# Patient Record
Sex: FEMALE | Race: WHITE | Employment: OTHER | URBAN - NONMETROPOLITAN AREA
[De-identification: names, ages, dates, MRNs, and addresses within clinical notes are randomized per-mention and may not be internally consistent; named-entity substitution may affect disease eponyms.]

---

## 2020-12-25 ENCOUNTER — APPOINTMENT (OUTPATIENT)
Dept: GENERAL RADIOLOGY | Age: 85
End: 2020-12-25
Payer: MEDICARE

## 2020-12-25 ENCOUNTER — HOSPITAL ENCOUNTER (INPATIENT)
Age: 85
LOS: 4 days | Discharge: NURSING, REHAB OR LONG-TERM CARE | DRG: 242 | End: 2020-12-29
Attending: INTERNAL MEDICINE | Admitting: INTERNAL MEDICINE
Payer: MEDICARE

## 2020-12-25 ENCOUNTER — HOSPITAL ENCOUNTER (EMERGENCY)
Age: 85
Discharge: ANOTHER HOSPITAL | End: 2020-12-25
Attending: EMERGENCY MEDICINE
Payer: MEDICARE

## 2020-12-25 VITALS
DIASTOLIC BLOOD PRESSURE: 100 MMHG | BODY MASS INDEX: 30.06 KG/M2 | SYSTOLIC BLOOD PRESSURE: 144 MMHG | TEMPERATURE: 97.5 F | WEIGHT: 210 LBS | HEART RATE: 32 BPM | OXYGEN SATURATION: 97 % | HEIGHT: 70 IN | RESPIRATION RATE: 17 BRPM

## 2020-12-25 DIAGNOSIS — I44.2 COMPLETE HEART BLOCK (HCC): Primary | ICD-10-CM

## 2020-12-25 LAB
ABO/RH: NORMAL
ALBUMIN SERPL-MCNC: 3.8 GM/DL (ref 3.4–5)
ALP BLD-CCNC: 85 IU/L (ref 40–129)
ALT SERPL-CCNC: 19 U/L (ref 10–40)
ANION GAP SERPL CALCULATED.3IONS-SCNC: 11 MMOL/L (ref 4–16)
ANTIBODY SCREEN: NEGATIVE
APTT: 40.9 SECONDS (ref 25.1–37.1)
AST SERPL-CCNC: 28 IU/L (ref 15–37)
BASOPHILS ABSOLUTE: 0.1 K/CU MM
BASOPHILS RELATIVE PERCENT: 1 % (ref 0–1)
BILIRUB SERPL-MCNC: 0.4 MG/DL (ref 0–1)
BUN BLDV-MCNC: 15 MG/DL (ref 6–23)
CALCIUM SERPL-MCNC: 10.5 MG/DL (ref 8.3–10.6)
CHLORIDE BLD-SCNC: 99 MMOL/L (ref 99–110)
CO2: 26 MMOL/L (ref 21–32)
CREAT SERPL-MCNC: 0.9 MG/DL (ref 0.6–1.1)
DIFFERENTIAL TYPE: ABNORMAL
EOSINOPHILS ABSOLUTE: 0.3 K/CU MM
EOSINOPHILS RELATIVE PERCENT: 3.4 % (ref 0–3)
GFR AFRICAN AMERICAN: >60 ML/MIN/1.73M2
GFR NON-AFRICAN AMERICAN: 60 ML/MIN/1.73M2
GLUCOSE BLD-MCNC: 118 MG/DL (ref 70–99)
GLUCOSE BLD-MCNC: 118 MG/DL (ref 70–99)
HCT VFR BLD CALC: 35.6 % (ref 37–47)
HEMOGLOBIN: 11.2 GM/DL (ref 12.5–16)
IMMATURE NEUTROPHIL %: 0.2 % (ref 0–0.43)
INR BLD: 1.09 INDEX
LYMPHOCYTES ABSOLUTE: 2.1 K/CU MM
LYMPHOCYTES RELATIVE PERCENT: 24.2 % (ref 24–44)
MCH RBC QN AUTO: 30.5 PG (ref 27–31)
MCHC RBC AUTO-ENTMCNC: 31.5 % (ref 32–36)
MCV RBC AUTO: 97 FL (ref 78–100)
MONOCYTES ABSOLUTE: 1 K/CU MM
MONOCYTES RELATIVE PERCENT: 11.9 % (ref 0–4)
PDW BLD-RTO: 13.5 % (ref 11.7–14.9)
PLATELET # BLD: 277 K/CU MM (ref 140–440)
PMV BLD AUTO: 9.7 FL (ref 7.5–11.1)
POTASSIUM SERPL-SCNC: 4 MMOL/L (ref 3.5–5.1)
PROTHROMBIN TIME: 12.5 SECONDS (ref 11.7–14.5)
RBC # BLD: 3.67 M/CU MM (ref 4.2–5.4)
SARS-COV-2, NAAT: NOT DETECTED
SEGMENTED NEUTROPHILS ABSOLUTE COUNT: 5.2 K/CU MM
SEGMENTED NEUTROPHILS RELATIVE PERCENT: 59.3 % (ref 36–66)
SODIUM BLD-SCNC: 136 MMOL/L (ref 135–145)
SOURCE: NORMAL
TOTAL IMMATURE NEUTOROPHIL: 0.02 K/CU MM
TOTAL PROTEIN: 6.4 GM/DL (ref 6.4–8.2)
TROPONIN T: <0.01 NG/ML
WBC # BLD: 8.7 K/CU MM (ref 4–10.5)

## 2020-12-25 PROCEDURE — 99285 EMERGENCY DEPT VISIT HI MDM: CPT

## 2020-12-25 PROCEDURE — 86900 BLOOD TYPING SEROLOGIC ABO: CPT

## 2020-12-25 PROCEDURE — 93005 ELECTROCARDIOGRAM TRACING: CPT | Performed by: EMERGENCY MEDICINE

## 2020-12-25 PROCEDURE — 2580000003 HC RX 258: Performed by: INTERNAL MEDICINE

## 2020-12-25 PROCEDURE — 85730 THROMBOPLASTIN TIME PARTIAL: CPT

## 2020-12-25 PROCEDURE — 86901 BLOOD TYPING SEROLOGIC RH(D): CPT

## 2020-12-25 PROCEDURE — 80053 COMPREHEN METABOLIC PANEL: CPT

## 2020-12-25 PROCEDURE — 85610 PROTHROMBIN TIME: CPT

## 2020-12-25 PROCEDURE — 81001 URINALYSIS AUTO W/SCOPE: CPT

## 2020-12-25 PROCEDURE — 2580000003 HC RX 258: Performed by: EMERGENCY MEDICINE

## 2020-12-25 PROCEDURE — 82962 GLUCOSE BLOOD TEST: CPT

## 2020-12-25 PROCEDURE — U0002 COVID-19 LAB TEST NON-CDC: HCPCS

## 2020-12-25 PROCEDURE — 71045 X-RAY EXAM CHEST 1 VIEW: CPT

## 2020-12-25 PROCEDURE — 85025 COMPLETE CBC W/AUTO DIFF WBC: CPT

## 2020-12-25 PROCEDURE — 96360 HYDRATION IV INFUSION INIT: CPT

## 2020-12-25 PROCEDURE — 2000000000 HC ICU R&B

## 2020-12-25 PROCEDURE — 86850 RBC ANTIBODY SCREEN: CPT

## 2020-12-25 PROCEDURE — 84484 ASSAY OF TROPONIN QUANT: CPT

## 2020-12-25 RX ORDER — FERROUS SULFATE 325(65) MG
325 TABLET ORAL
Status: ON HOLD | COMMUNITY
End: 2020-12-29 | Stop reason: SDUPTHER

## 2020-12-25 RX ORDER — SENNA AND DOCUSATE SODIUM 50; 8.6 MG/1; MG/1
2 TABLET, FILM COATED ORAL DAILY
Status: ON HOLD | COMMUNITY
End: 2020-12-29 | Stop reason: SDUPTHER

## 2020-12-25 RX ORDER — 0.9 % SODIUM CHLORIDE 0.9 %
1000 INTRAVENOUS SOLUTION INTRAVENOUS ONCE
Status: COMPLETED | OUTPATIENT
Start: 2020-12-25 | End: 2020-12-25

## 2020-12-25 RX ORDER — AMLODIPINE BESYLATE 5 MG/1
5 TABLET ORAL DAILY
Status: ON HOLD | COMMUNITY
End: 2020-12-29 | Stop reason: HOSPADM

## 2020-12-25 RX ORDER — ALBUTEROL SULFATE 90 UG/1
2 AEROSOL, METERED RESPIRATORY (INHALATION) EVERY 6 HOURS PRN
COMMUNITY

## 2020-12-25 RX ORDER — SODIUM CHLORIDE 0.9 % (FLUSH) 0.9 %
10 SYRINGE (ML) INJECTION EVERY 12 HOURS SCHEDULED
Status: DISCONTINUED | OUTPATIENT
Start: 2020-12-25 | End: 2020-12-27 | Stop reason: SDUPTHER

## 2020-12-25 RX ORDER — PROMETHAZINE HYDROCHLORIDE 25 MG/1
12.5 TABLET ORAL EVERY 6 HOURS PRN
Status: DISCONTINUED | OUTPATIENT
Start: 2020-12-25 | End: 2020-12-29 | Stop reason: HOSPADM

## 2020-12-25 RX ORDER — ATORVASTATIN CALCIUM 10 MG/1
10 TABLET, FILM COATED ORAL DAILY
COMMUNITY

## 2020-12-25 RX ORDER — ACETAMINOPHEN 325 MG/1
650 TABLET ORAL EVERY 6 HOURS PRN
Status: DISCONTINUED | OUTPATIENT
Start: 2020-12-25 | End: 2020-12-28

## 2020-12-25 RX ORDER — SODIUM CHLORIDE 0.9 % (FLUSH) 0.9 %
10 SYRINGE (ML) INJECTION PRN
Status: DISCONTINUED | OUTPATIENT
Start: 2020-12-25 | End: 2020-12-27 | Stop reason: SDUPTHER

## 2020-12-25 RX ORDER — DOPAMINE HYDROCHLORIDE 160 MG/100ML
2.5 INJECTION, SOLUTION INTRAVENOUS CONTINUOUS
Status: DISCONTINUED | OUTPATIENT
Start: 2020-12-25 | End: 2020-12-27

## 2020-12-25 RX ORDER — LOSARTAN POTASSIUM 100 MG/1
100 TABLET ORAL DAILY
Status: ON HOLD | COMMUNITY
End: 2022-05-12 | Stop reason: HOSPADM

## 2020-12-25 RX ORDER — PANTOPRAZOLE SODIUM 20 MG/1
20 TABLET, DELAYED RELEASE ORAL DAILY
COMMUNITY

## 2020-12-25 RX ORDER — ACETAMINOPHEN 650 MG/1
650 SUPPOSITORY RECTAL EVERY 6 HOURS PRN
Status: DISCONTINUED | OUTPATIENT
Start: 2020-12-25 | End: 2020-12-28

## 2020-12-25 RX ORDER — BUDESONIDE AND FORMOTEROL FUMARATE DIHYDRATE 160; 4.5 UG/1; UG/1
2 AEROSOL RESPIRATORY (INHALATION) 2 TIMES DAILY
COMMUNITY

## 2020-12-25 RX ORDER — ONDANSETRON 2 MG/ML
4 INJECTION INTRAMUSCULAR; INTRAVENOUS EVERY 6 HOURS PRN
Status: DISCONTINUED | OUTPATIENT
Start: 2020-12-25 | End: 2020-12-29 | Stop reason: HOSPADM

## 2020-12-25 RX ORDER — POLYETHYLENE GLYCOL 3350 17 G/17G
17 POWDER, FOR SOLUTION ORAL DAILY PRN
Status: DISCONTINUED | OUTPATIENT
Start: 2020-12-25 | End: 2020-12-29 | Stop reason: HOSPADM

## 2020-12-25 RX ORDER — ASPIRIN 81 MG/1
81 TABLET, CHEWABLE ORAL DAILY
COMMUNITY

## 2020-12-25 RX ADMIN — SODIUM CHLORIDE, PRESERVATIVE FREE 10 ML: 5 INJECTION INTRAVENOUS at 23:30

## 2020-12-25 RX ADMIN — SODIUM CHLORIDE 1000 ML: 9 INJECTION, SOLUTION INTRAVENOUS at 19:23

## 2020-12-25 NOTE — ED NOTES
Called access center to have [facility] hospitalist paged for consult      Latonia Gao  12/25/20 4953

## 2020-12-25 NOTE — ED PROVIDER NOTES
Cirilo Hollingsworth MD        atorvastatin (LIPITOR) tablet 10 mg  10 mg Oral Daily Cirilo Hollingsworth MD        budesonide-formoterol (SYMBICORT) 160-4.5 MCG/ACT inhaler 2 puff  2 puff Inhalation BID Cirilo Hollingsworth MD        ferrous sulfate (IRON 325) tablet 325 mg  325 mg Oral Daily with breakfast Cirilo Hollingsworth MD        losartan (COZAAR) tablet 100 mg  100 mg Oral Daily Cirilo Hollingsworth MD        pantoprazole (PROTONIX) tablet 20 mg  20 mg Oral Daily Cirilo Hollingsworth MD   20 mg at 12/26/20 0616    sennosides-docusate sodium (SENOKOT-S) 8.6-50 MG tablet 2 tablet  2 tablet Oral Daily Cirilo Hollingsworth MD        vitamin D CAPS 1,000 Units  1,000 Units Oral Daily Cirilo Hollingsworth MD        sodium chloride flush 0.9 % injection 10 mL  10 mL Intravenous 2 times per day Cirilo Hollingsworth MD   10 mL at 12/25/20 2330    sodium chloride flush 0.9 % injection 10 mL  10 mL Intravenous PRN Cirilo Hollingsworth MD        enoxaparin (LOVENOX) injection 40 mg  40 mg Subcutaneous Daily Cirilo Hollingsworth MD        promethazine (PHENERGAN) tablet 12.5 mg  12.5 mg Oral Q6H PRN Cirilo Hollingsworth MD        Or    ondansetron (ZOFRAN) injection 4 mg  4 mg Intravenous Q6H PRN Cirilo Hollingsworth MD        polyethylene glycol (GLYCOLAX) packet 17 g  17 g Oral Daily PRN Cirilo Hollingsworth MD        acetaminophen (TYLENOL) tablet 650 mg  650 mg Oral Q6H PRN Cirilo Hollingsworth MD        Or   Miriam Navraro acetaminophen (TYLENOL) suppository 650 mg  650 mg Rectal Q6H PRN Cirilo Hollingsworth MD        DOPamine (INTROPIN) 400 mg in dextrose 5 % 250 mL infusion  2.5 mcg/kg/min Intravenous Continuous iCrilo Hollingsworth MD           Allergies   Allergen Reactions    Lisinopril     Pcn [Penicillins]     Sulfa Antibiotics        Nursing Notes Reviewed    Physical Exam:  Triage VS:    ED Triage Vitals   Enc Vitals Group      BP  192/88      Pulse  44      Resp  18      Temp  97.5 °F      Temp src oral      SpO2  97%     My pulse ox interpretation is - normal    General appearance:  Patient is awake, alert, oriented. Following commands and answering questions. GCS is 15. Non-toxic in appearance. Skin:  Warm. Dry. Intact. No rashes, petechiae, purpura. Eye:  Pupils are equal, round, reactive. Extraocular movements intact. No nystagmus. No gaze deviation. Head, ears, nose, mouth and throat:  Head is normocephalic, atraumatic. No external masses or lesions. No nasal drainage. Pharynx is clear, non-erythematous. Airway is patent. Mucous membranes are moist  Neck:  Supple. No nuchal rigidity. Trachea midline. No masses, thyromegaly or lymphadenopathy. No JVD. No carotid thrills or bruits. Extremity:  No clubbing, cyanosis, or edema. No joint swelling. Normal muscle tone. Full range of motion in extremities. Heart:  Bradycardic rate and sinus rhythm. Audible S1 & S2. No audible murmurs, rubs, or gallops. Perfusion:  Symmetric peripheral pulses. Brisk capillary refill. Respiratory:  Lungs clear to auscultation bilaterally. No rales, rhonchi or wheezes. Respirations nonlabored. Abdominal:  Soft. Nontender. Non distended. Normal bowel sounds. No midline pulsatile abdominal masses, thrills or bruits. Back:  No CVA tenderness to palpation. No midline tenderness to palpation. Neurological:  Alert and oriented times 3. No focal or lateralizing neurological deficits.              Psychiatric:  Cooperative      *I have reviewed and interpreted all of the currently available results from this visit*    Labs  Results for orders placed or performed during the hospital encounter of 12/25/20   CBC Auto Differential   Result Value Ref Range    WBC 8.7 4.0 - 10.5 K/CU MM    RBC 3.67 (L) 4.2 - 5.4 M/CU MM    Hemoglobin 11.2 (L) 12.5 - 16.0 GM/DL    Hematocrit 35.6 (L) 37 - 47 %    MCV 97.0 78 - 100 FL    MCH 30.5 27 - 31 PG    MCHC 31.5 (L) 32.0 - 36.0 %    RDW 13.5 11.7 - 14.9 %    Platelets 278 871 - 440 K/CU MM    MPV 9.7 7.5 - 11.1 FL    Differential Type AUTOMATED DIFFERENTIAL     Segs Relative 59.3 36 - 66 %    Lymphocytes % 24.2 24 - 44 %    Monocytes % 11.9 (H) 0 - 4 %    Eosinophils % 3.4 (H) 0 - 3 %    Basophils % 1.0 0 - 1 %    Segs Absolute 5.2 K/CU MM    Lymphocytes Absolute 2.1 K/CU MM    Monocytes Absolute 1.0 K/CU MM    Eosinophils Absolute 0.3 K/CU MM    Basophils Absolute 0.1 K/CU MM    Immature Neutrophil % 0.2 0 - 0.43 %    Total Immature Neutrophil 0.02 K/CU MM   Comprehensive Metabolic Panel w/ Reflex to MG   Result Value Ref Range    Sodium 136 135 - 145 MMOL/L    Potassium 4.0 3.5 - 5.1 MMOL/L    Chloride 99 99 - 110 mMol/L    CO2 26 21 - 32 MMOL/L    BUN 15 6 - 23 MG/DL    CREATININE 0.9 0.6 - 1.1 MG/DL    Glucose 118 (H) 70 - 99 MG/DL    Calcium 10.5 8.3 - 10.6 MG/DL    Alb 3.8 3.4 - 5.0 GM/DL    Total Protein 6.4 6.4 - 8.2 GM/DL    Total Bilirubin 0.4 0.0 - 1.0 MG/DL    ALT 19 10 - 40 U/L    AST 28 15 - 37 IU/L    Alkaline Phosphatase 85 40 - 129 IU/L    GFR Non-African American 60 (L) >60 mL/min/1.73m2    GFR African American >60 >60 mL/min/1.73m2    Anion Gap 11 4 - 16   Troponin   Result Value Ref Range    Troponin T <0.010 <0.01 NG/ML   Urinalysis Reflex to Culture    Specimen: Urine   Result Value Ref Range    Color, UA YELLOW YELLOW    Clarity, UA CLEAR CLEAR    Glucose, Urine NEGATIVE NEGATIVE MG/DL    Bilirubin Urine NEGATIVE NEGATIVE MG/DL    Ketones, Urine NEGATIVE NEGATIVE MG/DL    Specific Gravity, UA 1.015 1.001 - 1.035    Blood, Urine NEGATIVE NEGATIVE    pH, Urine 7.5 5.0 - 8.0    Protein, UA NEGATIVE NEGATIVE MG/DL    Urobilinogen, Urine 0.2 0.2 - 1.0 MG/DL    Nitrite Urine, Quantitative NEGATIVE NEGATIVE    Leukocyte Esterase, Urine NEGATIVE NEGATIVE    RBC, UA NO CELLS SEEN 0 - 6 /HPF    WBC, UA NO CELLS SEEN 0 - 5 /HPF    Epithelial Cells, UA NO CELLS SEEN /HPF    Cast Type NO CAST FORMS SEEN NO CAST FORMS SEEN /HPF    Bacteria, UA NEGATIVE NEGATIVE /HPF    Crystal Type NONE SEEN NEGATIVE /HPF    Mucus, UA NEGATIVE NEGATIVE HPF   APTT   Result Value Ref Range    aPTT 40.9 (H) 25.1 - 37.1 SECONDS   Protime-INR   Result Value Ref Range    Protime 12.5 11.7 - 14.5 SECONDS    INR 1.09 INDEX   COVID-19    Specimen: Nasopharyngeal Swab   Result Value Ref Range    Source THROAT     SARS-CoV-2, NAAT NOT DETECTED    EKG 12 Lead   Result Value Ref Range    Ventricular Rate 42 BPM    Atrial Rate 72 BPM    QRS Duration 154 ms    Q-T Interval 502 ms    QTc Calculation (Bazett) 419 ms    P Axis -4 degrees    R Axis -8 degrees    T Axis -11 degrees    Diagnosis       Sinus rhythm with complete heart block and Wide QRS rhythm  Right bundle branch block  Septal infarct , age undetermined  Inferior infarct , age undetermined  Abnormal ECG  No previous ECGs available     TYPE AND SCREEN   Result Value Ref Range    ABO/Rh O NEGATIVE     Antibody Screen NEGATIVE        Radiographs:  Xr Chest Portable    Result Date: 12/25/2020  EXAMINATION: ONE XRAY VIEW OF THE CHEST 12/25/2020 5:27 pm COMPARISON: None. HISTORY: ORDERING SYSTEM PROVIDED HISTORY: cough TECHNOLOGIST PROVIDED HISTORY: Reason for exam:->cough Acuity: Acute Type of Exam: Initial Additional signs and symptoms: cough FINDINGS: The cardiac silhouette is moderately enlarged. Nonspecific right paratracheal opacity. No pneumothorax, vascular congestion, consolidation, or pleural effusion is identified. No acute osseous abnormality. 1. Nonspecific right paratracheal opacity. Recommend further evaluation with CT. 2. Moderately enlarged cardiac silhouette. EKG: (All EKG's are interpreted by myself in the absence of a cardiologist). EKG performed on 12/25/2020 at 1655 demonstrates ventricular rate of 42 bpm in sinus rhythm with complete heart block and wide QRS rhythm. There is right bundle branch block pattern. MDM:  Patient was seen and evaluated in the emergency department by myself.  A thorough history and physical exam were performed, prior medical records were reviewed. Upon arrival to the emergency department, patient's vital signs were noted. Pressure is 192/88, pulse is 44. Respirations are 18. O2 saturation 97%. Temperature is afebrile. Patient was connected to continuous cardiopulmonary monitoring. Rhythm strip interpreted by myself demonstrating sinus rhythm. EKG was performed, interpreted by myself, as detailed above. Given concern for third-degree AV block, patient was immediately connected to [facility] monitor with pacer pads applied. Case discussed with cardiologist on-call Dr. Chiara Blue who agrees that patient is in third-degree AV block. As patient is hemodynamically stable, no recommendation for transvenous pacemaker. Recommends we initiate dopamine. Transcutaneous pacing is required. Dopamine is initiated. Differential diagnoses and treatment plan were discussed. IV access was established. Pertinent labs were drawn and radiographic studies were performed, once results are available, they are reviewed by myself. Labs demonstrate no leukocytosis. Patient does have normocytic anemia with a hemoglobin of 11.2. No thrombocytopenia. Electrolytes within normal history no signs of kidney injury glucose within normal limits. AST and ALT are unremarkable. Troponin less than 0.010. Urinalysis negative for urinary tract infection. INR 1.09.  COVID-19 is not detected. Chest x-ray radiology report reads nonspecific right paratracheal opacity. On repeat evaluations, patient remains hemodynamically stable. Pressure remained stable, MAP remains above 65, systolic blood pressure above 120. Case is discussed with admitting hospitalist at [facility] Dr. Jhonatan Hernandez who is agreeable with treatment plan and accepts transfer. EMTALA paperwork and transfer paperwork are completed.   Patient is currently in the emergency department, in stable condition, awaiting bed placement and transfer port to [facility]. Clinical Impressions:  1. Third degree AV block ([address]    2. Accelerated hypertension        Procedures:  ACLS protocol      Critical Care Note:  Total critical care time provided today was 32 minutes. This excludes seperately billable procedures and family discussion time. Critical care time provided for obtaining history, conducting a physical exam, performing and monitoring interventions, ordering, collecting and interpreting tests, and establishing medical decision-making. There was a potential for life/limb threatening pathology requiring close evaluation and intervention with concern for patient decompensation. ED Provider Disposition:  DISPOSITION  Transfer to [facility]      Comment: Please note this report has been produced using speech recognition software and may contain errors related to that system including errors in grammar, punctuation, and spelling, as well as words and phrases that may be inappropriate. Efforts were made to edit the dictations.        [facility],   12/26/20 5075

## 2020-12-25 NOTE — ED NOTES
Pt. Up to [facility] W/ little assistance denies any dizzy ness or discomfort     Jimy Barajas RN  12/25/20 2604

## 2020-12-26 LAB
ALBUMIN SERPL-MCNC: 3.7 GM/DL (ref 3.4–5)
ALP BLD-CCNC: 84 IU/L (ref 40–128)
ALT SERPL-CCNC: 16 U/L (ref 10–40)
ANION GAP SERPL CALCULATED.3IONS-SCNC: 10 MMOL/L (ref 4–16)
APTT: 40.2 SECONDS (ref 25.1–37.1)
AST SERPL-CCNC: 21 IU/L (ref 15–37)
BACTERIA: NEGATIVE /HPF
BASOPHILS ABSOLUTE: 0.1 K/CU MM
BASOPHILS RELATIVE PERCENT: 0.8 % (ref 0–1)
BILIRUB SERPL-MCNC: 0.5 MG/DL (ref 0–1)
BILIRUBIN URINE: NEGATIVE MG/DL
BLOOD, URINE: NEGATIVE
BUN BLDV-MCNC: 11 MG/DL (ref 6–23)
CALCIUM SERPL-MCNC: 9.6 MG/DL (ref 8.3–10.6)
CAST TYPE: NORMAL /HPF
CHLORIDE BLD-SCNC: 102 MMOL/L (ref 99–110)
CLARITY: CLEAR
CO2: 26 MMOL/L (ref 21–32)
COLOR: YELLOW
CREAT SERPL-MCNC: 0.8 MG/DL (ref 0.6–1.1)
CRYSTAL TYPE: NORMAL /HPF
DIFFERENTIAL TYPE: ABNORMAL
EKG ATRIAL RATE: 40 BPM
EKG DIAGNOSIS: NORMAL
EKG P AXIS: 53 DEGREES
EKG P-R INTERVAL: 320 MS
EKG Q-T INTERVAL: 528 MS
EKG QRS DURATION: 148 MS
EKG QTC CALCULATION (BAZETT): 430 MS
EKG R AXIS: -25 DEGREES
EKG T AXIS: -12 DEGREES
EKG VENTRICULAR RATE: 40 BPM
EOSINOPHILS ABSOLUTE: 0.3 K/CU MM
EOSINOPHILS RELATIVE PERCENT: 2.8 % (ref 0–3)
EPITHELIAL CELLS, UA: NORMAL /HPF
GFR AFRICAN AMERICAN: >60 ML/MIN/1.73M2
GFR NON-AFRICAN AMERICAN: >60 ML/MIN/1.73M2
GLUCOSE BLD-MCNC: 115 MG/DL (ref 70–99)
GLUCOSE BLD-MCNC: 128 MG/DL (ref 70–99)
GLUCOSE, URINE: NEGATIVE MG/DL
HCT VFR BLD CALC: 33.8 % (ref 37–47)
HEMOGLOBIN: 10.8 GM/DL (ref 12.5–16)
IMMATURE NEUTROPHIL %: 0.4 % (ref 0–0.43)
INR BLD: 1.02 INDEX
KETONES, URINE: NEGATIVE MG/DL
LEUKOCYTE ESTERASE, URINE: NEGATIVE
LYMPHOCYTES ABSOLUTE: 1.9 K/CU MM
LYMPHOCYTES RELATIVE PERCENT: 19.8 % (ref 24–44)
MCH RBC QN AUTO: 30.3 PG (ref 27–31)
MCHC RBC AUTO-ENTMCNC: 32 % (ref 32–36)
MCV RBC AUTO: 94.9 FL (ref 78–100)
MONOCYTES ABSOLUTE: 1.3 K/CU MM
MONOCYTES RELATIVE PERCENT: 13.3 % (ref 0–4)
MUCUS: NEGATIVE HPF
NITRITE URINE, QUANTITATIVE: NEGATIVE
NUCLEATED RBC %: 0 %
PDW BLD-RTO: 13.6 % (ref 11.7–14.9)
PH, URINE: 7.5 (ref 5–8)
PLATELET # BLD: 256 K/CU MM (ref 140–440)
PMV BLD AUTO: 9.3 FL (ref 7.5–11.1)
POTASSIUM SERPL-SCNC: 4 MMOL/L (ref 3.5–5.1)
PROTEIN UA: NEGATIVE MG/DL
PROTHROMBIN TIME: 12.3 SECONDS (ref 11.7–14.5)
RBC # BLD: 3.56 M/CU MM (ref 4.2–5.4)
RBC URINE: NORMAL /HPF (ref 0–6)
SEGMENTED NEUTROPHILS ABSOLUTE COUNT: 6.1 K/CU MM
SEGMENTED NEUTROPHILS RELATIVE PERCENT: 62.9 % (ref 36–66)
SODIUM BLD-SCNC: 138 MMOL/L (ref 135–145)
SPECIFIC GRAVITY UA: 1.01 (ref 1–1.03)
TOTAL IMMATURE NEUTOROPHIL: 0.04 K/CU MM
TOTAL NUCLEATED RBC: 0 K/CU MM
TOTAL PROTEIN: 5.9 GM/DL (ref 6.4–8.2)
UROBILINOGEN, URINE: 0.2 MG/DL (ref 0.2–1)
WBC # BLD: 9.8 K/CU MM (ref 4–10.5)
WBC UA: NORMAL /HPF (ref 0–5)

## 2020-12-26 PROCEDURE — 36415 COLL VENOUS BLD VENIPUNCTURE: CPT

## 2020-12-26 PROCEDURE — 6370000000 HC RX 637 (ALT 250 FOR IP): Performed by: INTERNAL MEDICINE

## 2020-12-26 PROCEDURE — 2000000000 HC ICU R&B

## 2020-12-26 PROCEDURE — 93005 ELECTROCARDIOGRAM TRACING: CPT | Performed by: INTERNAL MEDICINE

## 2020-12-26 PROCEDURE — 99222 1ST HOSP IP/OBS MODERATE 55: CPT | Performed by: INTERNAL MEDICINE

## 2020-12-26 PROCEDURE — 93005 ELECTROCARDIOGRAM TRACING: CPT | Performed by: SURGERY

## 2020-12-26 PROCEDURE — 94640 AIRWAY INHALATION TREATMENT: CPT

## 2020-12-26 PROCEDURE — 80053 COMPREHEN METABOLIC PANEL: CPT

## 2020-12-26 PROCEDURE — 85025 COMPLETE CBC W/AUTO DIFF WBC: CPT

## 2020-12-26 PROCEDURE — 2580000003 HC RX 258: Performed by: INTERNAL MEDICINE

## 2020-12-26 PROCEDURE — 2500000003 HC RX 250 WO HCPCS: Performed by: SURGERY

## 2020-12-26 PROCEDURE — 6360000002 HC RX W HCPCS: Performed by: INTERNAL MEDICINE

## 2020-12-26 PROCEDURE — 85730 THROMBOPLASTIN TIME PARTIAL: CPT

## 2020-12-26 PROCEDURE — 85610 PROTHROMBIN TIME: CPT

## 2020-12-26 PROCEDURE — 94761 N-INVAS EAR/PLS OXIMETRY MLT: CPT

## 2020-12-26 PROCEDURE — 93010 ELECTROCARDIOGRAM REPORT: CPT | Performed by: INTERNAL MEDICINE

## 2020-12-26 PROCEDURE — 2700000000 HC OXYGEN THERAPY PER DAY

## 2020-12-26 PROCEDURE — 82962 GLUCOSE BLOOD TEST: CPT

## 2020-12-26 RX ORDER — BUDESONIDE AND FORMOTEROL FUMARATE DIHYDRATE 160; 4.5 UG/1; UG/1
2 AEROSOL RESPIRATORY (INHALATION) 2 TIMES DAILY
Status: DISCONTINUED | OUTPATIENT
Start: 2020-12-26 | End: 2020-12-29 | Stop reason: HOSPADM

## 2020-12-26 RX ORDER — LOSARTAN POTASSIUM 100 MG/1
100 TABLET ORAL DAILY
Status: DISCONTINUED | OUTPATIENT
Start: 2020-12-26 | End: 2020-12-29 | Stop reason: HOSPADM

## 2020-12-26 RX ORDER — HYDRALAZINE HYDROCHLORIDE 20 MG/ML
10 INJECTION INTRAMUSCULAR; INTRAVENOUS EVERY 4 HOURS PRN
Status: DISCONTINUED | OUTPATIENT
Start: 2020-12-26 | End: 2020-12-29 | Stop reason: HOSPADM

## 2020-12-26 RX ORDER — NITROGLYCERIN 20 MG/100ML
5 INJECTION INTRAVENOUS CONTINUOUS
Status: DISCONTINUED | OUTPATIENT
Start: 2020-12-26 | End: 2020-12-27

## 2020-12-26 RX ORDER — FERROUS SULFATE 325(65) MG
325 TABLET ORAL
Status: DISCONTINUED | OUTPATIENT
Start: 2020-12-26 | End: 2020-12-29 | Stop reason: HOSPADM

## 2020-12-26 RX ORDER — ASPIRIN 81 MG/1
81 TABLET, CHEWABLE ORAL DAILY
Status: DISCONTINUED | OUTPATIENT
Start: 2020-12-26 | End: 2020-12-29 | Stop reason: HOSPADM

## 2020-12-26 RX ORDER — ALBUTEROL SULFATE 90 UG/1
2 AEROSOL, METERED RESPIRATORY (INHALATION) EVERY 6 HOURS PRN
Status: DISCONTINUED | OUTPATIENT
Start: 2020-12-26 | End: 2020-12-29 | Stop reason: HOSPADM

## 2020-12-26 RX ORDER — PANTOPRAZOLE SODIUM 20 MG/1
20 TABLET, DELAYED RELEASE ORAL DAILY
Status: DISCONTINUED | OUTPATIENT
Start: 2020-12-26 | End: 2020-12-29 | Stop reason: HOSPADM

## 2020-12-26 RX ORDER — ATORVASTATIN CALCIUM 10 MG/1
10 TABLET, FILM COATED ORAL DAILY
Status: DISCONTINUED | OUTPATIENT
Start: 2020-12-26 | End: 2020-12-29 | Stop reason: HOSPADM

## 2020-12-26 RX ORDER — SENNA AND DOCUSATE SODIUM 50; 8.6 MG/1; MG/1
2 TABLET, FILM COATED ORAL DAILY
Status: DISCONTINUED | OUTPATIENT
Start: 2020-12-26 | End: 2020-12-29 | Stop reason: HOSPADM

## 2020-12-26 RX ADMIN — ASPIRIN 81 MG CHEWABLE TABLET 81 MG: 81 TABLET CHEWABLE at 09:27

## 2020-12-26 RX ADMIN — DOCUSATE SODIUM AND SENNOSIDES 2 TABLET: 8.6; 5 TABLET, FILM COATED ORAL at 09:27

## 2020-12-26 RX ADMIN — SODIUM CHLORIDE, PRESERVATIVE FREE 10 ML: 5 INJECTION INTRAVENOUS at 20:15

## 2020-12-26 RX ADMIN — BUDESONIDE AND FORMOTEROL FUMARATE DIHYDRATE 2 PUFF: 160; 4.5 AEROSOL RESPIRATORY (INHALATION) at 10:15

## 2020-12-26 RX ADMIN — Medication 1000 UNITS: at 09:27

## 2020-12-26 RX ADMIN — NITROGLYCERIN 5 MCG/MIN: 20 INJECTION INTRAVENOUS at 10:54

## 2020-12-26 RX ADMIN — SODIUM CHLORIDE, PRESERVATIVE FREE 10 ML: 5 INJECTION INTRAVENOUS at 09:33

## 2020-12-26 RX ADMIN — LOSARTAN POTASSIUM 100 MG: 100 TABLET, FILM COATED ORAL at 09:32

## 2020-12-26 RX ADMIN — PANTOPRAZOLE SODIUM 20 MG: 20 TABLET, DELAYED RELEASE ORAL at 06:16

## 2020-12-26 RX ADMIN — HYDRALAZINE HYDROCHLORIDE 10 MG: 20 INJECTION INTRAMUSCULAR; INTRAVENOUS at 10:44

## 2020-12-26 RX ADMIN — FERROUS SULFATE TAB 325 MG (65 MG ELEMENTAL FE) 325 MG: 325 (65 FE) TAB at 09:28

## 2020-12-26 RX ADMIN — BUDESONIDE AND FORMOTEROL FUMARATE DIHYDRATE 2 PUFF: 160; 4.5 AEROSOL RESPIRATORY (INHALATION) at 21:31

## 2020-12-26 RX ADMIN — ALBUTEROL SULFATE 2 PUFF: 90 AEROSOL, METERED RESPIRATORY (INHALATION) at 10:16

## 2020-12-26 RX ADMIN — ATORVASTATIN CALCIUM 10 MG: 10 TABLET, FILM COATED ORAL at 09:27

## 2020-12-26 RX ADMIN — HYDRALAZINE HYDROCHLORIDE 10 MG: 20 INJECTION INTRAMUSCULAR; INTRAVENOUS at 06:08

## 2020-12-26 RX ADMIN — ENOXAPARIN SODIUM 40 MG: 40 INJECTION SUBCUTANEOUS at 09:27

## 2020-12-26 ASSESSMENT — PAIN DESCRIPTION - PROGRESSION
CLINICAL_PROGRESSION: GRADUALLY WORSENING
CLINICAL_PROGRESSION: GRADUALLY WORSENING
CLINICAL_PROGRESSION: RAPIDLY WORSENING

## 2020-12-26 ASSESSMENT — PAIN DESCRIPTION - ORIENTATION: ORIENTATION: MID

## 2020-12-26 ASSESSMENT — PAIN DESCRIPTION - LOCATION
LOCATION: CHEST
LOCATION: CHEST

## 2020-12-26 ASSESSMENT — PAIN SCALES - GENERAL
PAINLEVEL_OUTOF10: 0
PAINLEVEL_OUTOF10: 5
PAINLEVEL_OUTOF10: 5
PAINLEVEL_OUTOF10: 2

## 2020-12-26 ASSESSMENT — PAIN DESCRIPTION - FREQUENCY
FREQUENCY: CONTINUOUS

## 2020-12-26 ASSESSMENT — PAIN - FUNCTIONAL ASSESSMENT
PAIN_FUNCTIONAL_ASSESSMENT: PREVENTS OR INTERFERES SOME ACTIVE ACTIVITIES AND ADLS
PAIN_FUNCTIONAL_ASSESSMENT: ACTIVITIES ARE NOT PREVENTED
PAIN_FUNCTIONAL_ASSESSMENT: ACTIVITIES ARE NOT PREVENTED
PAIN_FUNCTIONAL_ASSESSMENT: PREVENTS OR INTERFERES SOME ACTIVE ACTIVITIES AND ADLS
PAIN_FUNCTIONAL_ASSESSMENT: ACTIVITIES ARE NOT PREVENTED

## 2020-12-26 ASSESSMENT — PAIN DESCRIPTION - DESCRIPTORS
DESCRIPTORS: ACHING
DESCRIPTORS: ACHING
DESCRIPTORS: DISCOMFORT;ACHING
DESCRIPTORS: ACHING

## 2020-12-26 ASSESSMENT — PAIN DESCRIPTION - ONSET
ONSET: ON-GOING

## 2020-12-26 ASSESSMENT — PAIN DESCRIPTION - PAIN TYPE: TYPE: ACUTE PAIN

## 2020-12-26 NOTE — PROGRESS NOTES
Patiebnt c/o of mid chest pain #2-3  On 1-10 scale.   Dr Trixie Zacarias here, informed- states she needs a  pacer

## 2020-12-26 NOTE — CONSULTS
Department of Cardiovascular & Thoracic Surgery   Consult Note    Reason for Consult: Bradycardia and complete heart block syncope and chest pain    Requesting Physician: Dr. Melissa Seals    Date of Consult: 12/26/20       History Obtained From:  Patient, emr     HISTORY OF PRESENT ILLNESS:    The patient is a 81 y.o. female who presents with history of confusional state and shortness of breath.     She has been in a nursing home resident with history of hypertension and COPD  She was subsequently assessed in the emergency room and and a cardiology as well noted be in complete heart block with heart rate in the 40s  Patient placed on dopamine to maintain the heart rate and consider his request for permanent pacemaker    Past Medical History:        Diagnosis Date    Arthrofibrosis of total knee arthroplasty [address] [address]     bilateral    Asthma     CAD (coronary artery disease)     CHF (congestive heart failure) ([facility] [address] [facility]     Constipation     DDD (degenerative disc disease), cervical     Diabetes mellitus ([facility] [address] [facility]     Femur fracture, right ([facility] [address]     distal femur    GERD (gastroesophageal reflux disease)     Hypertension     Venous stasis      Past Surgical History:        Procedure Laterality Date    APPENDECTOMY      KNEE SURGERY       Current Medications:   Current Facility-Administered Medications: albuterol sulfate  (90 Base) MCG/ACT inhaler 2 puff, 2 puff, Inhalation, Q6H PRN  aspirin chewable tablet 81 mg, 81 mg, Oral, Daily  atorvastatin (LIPITOR) tablet 10 mg, 10 mg, Oral, Daily  budesonide-formoterol (SYMBICORT) 160-4.5 MCG/ACT inhaler 2 puff, 2 puff, Inhalation, BID  ferrous sulfate (IRON 325) tablet 325 mg, 325 mg, Oral, Daily with breakfast  losartan (COZAAR) tablet 100 mg, 100 mg, Oral, Daily  pantoprazole (PROTONIX) tablet 20 mg, 20 mg, Oral, Daily  sennosides-docusate sodium (SENOKOT-S) 8.6-50 MG tablet 2 tablet, 2 tablet, Oral, Daily  vitamin D CAPS 1,000 Units, 1,000 Units, Oral, Daily  hydrALAZINE (APRESOLINE) injection 10 mg, 10 mg, Intravenous, Q4H PRN  nitroGLYCERIN 50 mg in dextrose 5% 250 mL infusion, 5 mcg/min, Intravenous, Continuous  sodium chloride flush 0.9 % injection 10 mL, 10 mL, Intravenous, 2 times per day  sodium chloride flush 0.9 % injection 10 mL, 10 mL, Intravenous, PRN  enoxaparin (LOVENOX) injection 40 mg, 40 mg, Subcutaneous, Daily  promethazine (PHENERGAN) tablet 12.5 mg, 12.5 mg, Oral, Q6H PRN **OR** ondansetron (ZOFRAN) injection 4 mg, 4 mg, Intravenous, Q6H PRN  polyethylene glycol (GLYCOLAX) packet 17 g, 17 g, Oral, Daily PRN  acetaminophen (TYLENOL) tablet 650 mg, 650 mg, Oral, Q6H PRN **OR** acetaminophen (TYLENOL) suppository 650 mg, 650 mg, Rectal, Q6H PRN  DOPamine (INTROPIN) 400 mg in dextrose 5 % 250 mL infusion, 2.5 mcg/kg/min, Intravenous, Continuous  Allergies:     Allergies   Allergen Reactions    Lisinopril     Pcn [Penicillins]     Sulfa Antibiotics        Social History:   Social History     Socioeconomic History    Marital status:      Spouse name: Not on file    Number of children: 3    Years of education: Not on file    Highest education level: Not on file   Occupational History    Not on file   Social Needs    Financial resource strain: Not on file    Food insecurity     Worry: Not on file     Inability: Not on file   [employer] needs     Medical: Not on file     Non-medical: Not on file   Tobacco Use    Smoking status: Never Smoker    Smokeless tobacco: Never Used   Substance and Sexual Activity    Alcohol use: Not Currently    Drug use: Never    Sexual activity: Not Currently   Lifestyle    Physical activity     Days per week: Not on file     Minutes per session: Not on file    Stress: Not on file   Relationships    Social connections     Talks on phone: Not on file     Gets together: Not on file     Attends Faith service: Not on file     Active member of club or organization: Not on file     Attends meetings of clubs or organizations: Not on file     Relationship status: Not on file    Intimate partner violence     Fear of current or ex partner: Not on file     Emotionally abused: Not on file     Physically abused: Not on file     Forced sexual activity: Not on file   Other Topics Concern    Not on file   Social History Narrative    Not on file       Family History:    No family history on file. REVIEW OF SYSTEMS:  Constitutional: Negative for fever, chills, diaphoresis, appetite change and fatigue. HENT: Negative for sore throat, trouble swallowing and voice change. Respiratory: Negative for cough, positive for shortness of breath no wheezing. Cardiovascular: Negative for chest pain positive for SOB with one flight of stairs exertion, no pitting LE edema. Gastrointestinal: Negative for nausea, vomiting, abdominal distention, constipation, no diarrhea, blood in stool, anal bleeding or rectal pain. Musculoskeletal: Negative for joint swelling and arthralgias. Skin: Warm and dry, well perfused. Neurological: Negative for seizures, syncope, speech difficulty and weakness. Hematological/Lymphatic: Negative for adenopathy. No history of DVT/PE. Does not bruise/bleed easily. Psychiatric/Behavioral: Negative for agitation. All others reviewed and negative. EXAM:  Constitutional: Blood pressure (!) 174/60, pulse (!) 41, temperature 97.5 °F (36.4 °C), temperature source Oral, resp. rate 15, height 5' 9\" (1.753 m), weight 207 lb 10.8 oz (94.2 kg), SpO2 92 %. No apparent distress, appears stated age and cooperative. Neurologic: follows commands, no focal weakness noted   Lungs: Good respiratory effort.  Clear to auscultation,   CV: Regular rate/ rhythm , no peripheral edema, feet warm and well perfused  GI: Soft, non-tender in all four quadrants, non-distended, + bowel sounds, liver and spleen no palpable masses  : bladder nondistended   MSK: no obvious deformity   Skin: warm, pink and dry       DATA:  Chest

## 2020-12-26 NOTE — PROGRESS NOTES
previous alcohol use. She reports that she does not use drugs.   Family history:   no family history of CAD, STROKE of DM    Allergies   Allergen Reactions    Lisinopril     Pcn [Penicillins]     Sulfa Antibiotics            hydrALAZINE (APRESOLINE) 10 mg in sodium chloride 0.9 % 50 mL ivpb, Q4H PRN      albuterol sulfate  (90 Base) MCG/ACT inhaler 2 puff, Q6H PRN      aspirin chewable tablet 81 mg, Daily      atorvastatin (LIPITOR) tablet 10 mg, Daily      budesonide-formoterol (SYMBICORT) 160-4.5 MCG/ACT inhaler 2 puff, BID      ferrous sulfate (IRON 325) tablet 325 mg, Daily with breakfast      losartan (COZAAR) tablet 100 mg, Daily      pantoprazole (PROTONIX) tablet 20 mg, Daily      sennosides-docusate sodium (SENOKOT-S) 8.6-50 MG tablet 2 tablet, Daily      vitamin D CAPS 1,000 Units, Daily      sodium chloride flush 0.9 % injection 10 mL, 2 times per day      sodium chloride flush 0.9 % injection 10 mL, PRN      enoxaparin (LOVENOX) injection 40 mg, Daily      promethazine (PHENERGAN) tablet 12.5 mg, Q6H PRN    Or      ondansetron (ZOFRAN) injection 4 mg, Q6H PRN      polyethylene glycol (GLYCOLAX) packet 17 g, Daily PRN      acetaminophen (TYLENOL) tablet 650 mg, Q6H PRN    Or      acetaminophen (TYLENOL) suppository 650 mg, Q6H PRN      DOPamine (INTROPIN) 400 mg in dextrose 5 % 250 mL infusion, Continuous      Current Facility-Administered Medications   Medication Dose Route Frequency Provider Last Rate Last Admin    hydrALAZINE (APRESOLINE) 10 mg in sodium chloride 0.9 % 50 mL ivpb  10 mg Intravenous Q4H PRN Sunitha Brink MD   Stopped at 12/26/20 0653    albuterol sulfate  (90 Base) MCG/ACT inhaler 2 puff  2 puff Inhalation Q6H PRN Sunitha Brink MD        aspirin chewable tablet 81 mg  81 mg Oral Daily Sunitha Brink MD   81 mg at 12/26/20 5996    atorvastatin (LIPITOR) tablet 10 mg  10 mg Oral Daily Sunitha Brink MD   10 mg at 12/26/20 9109    budesonide-formoterol (SYMBICORT) 160-4.5 MCG/ACT inhaler 2 puff  2 puff Inhalation BID Luisa Gomez MD        ferrous sulfate (IRON 325) tablet 325 mg  325 mg Oral Daily with breakfast Luisa Gomez MD   325 mg at 12/26/20 0026    losartan (COZAAR) tablet 100 mg  100 mg Oral Daily Luisa Gomez MD        pantoprazole (PROTONIX) tablet 20 mg  20 mg Oral Daily Luisa Gomez MD   20 mg at 12/26/20 5096    sennosides-docusate sodium (SENOKOT-S) 8.6-50 MG tablet 2 tablet  2 tablet Oral Daily Luisa Gomez MD   2 tablet at 12/26/20 8437    vitamin D CAPS 1,000 Units  1,000 Units Oral Daily Luisa Gomez MD   1,000 Units at 12/26/20 6532    sodium chloride flush 0.9 % injection 10 mL  10 mL Intravenous 2 times per day Luisa Gomez MD   10 mL at 12/25/20 2330    sodium chloride flush 0.9 % injection 10 mL  10 mL Intravenous PRN Luisa Gomez MD        enoxaparin (LOVENOX) injection 40 mg  40 mg Subcutaneous Daily Luisa Gomez MD   40 mg at 12/26/20 1364    promethazine (PHENERGAN) tablet 12.5 mg  12.5 mg Oral Q6H PRN Luisa Gomez MD        Or    ondansetron (ZOFRAN) injection 4 mg  4 mg Intravenous Q6H PRN Luisa Gomez MD        polyethylene glycol (GLYCOLAX) packet 17 g  17 g Oral Daily PRN Luisa Gomez MD        acetaminophen (TYLENOL) tablet 650 mg  650 mg Oral Q6H PRN Luisa Gomez MD        Or   [facility] acetaminophen (TYLENOL) suppository 650 mg  650 mg Rectal Q6H PRN Luisa Gomez MD        DOPamine (INTROPIN) 400 mg in dextrose 5 % 250 mL infusion  2.5 mcg/kg/min Intravenous Continuous Luisa Gomez MD             Review of Systems:     · Constitutional: No Fever or Weight Loss + Dizziness, + fall No syncope  · Eyes: No Decreased Vision  · ENT: No Headaches, Hearing Loss or Vertigo  · Cardiovascular: No chest pain, dyspnea on exertion, palpitations or loss of consciousness  · Respiratory: No cough or wheezing    · Gastrointestinal: No abdominal pain, appetite loss, blood in stools, constipation, diarrhea or heartburn  · Genitourinary: No dysuria, trouble voiding, or hematuria  · Musculoskeletal:  No gait disturbance, weakness or joint complaints  · Integumentary: No rash or pruritis  · Neurological: No TIA or stroke symptoms  · Psychiatric: No anxiety or depression  · Endocrine: No malaise, fatigue or temperature intolerance  · Hematologic/Lymphatic: No bleeding problems, blood clots or swollen lymph nodes  · Allergic/Immunologic: No nasal congestion or hives    All other systems were reviewed and were negative otherwise. Physical Examination:      Vitals:    12/26/20 0700   BP: (!) 177/60   Pulse: (!) 43   Resp: (!) 33   Temp:    SpO2: 95%      Wt Readings from Last 3 Encounters:   12/26/20 207 lb 10.8 oz (94.2 kg)   12/25/20 210 lb (95.3 kg)     Body mass index is 30.67 kg/m². General Appearance:  No distress, conversant  Constitutional:  Well developed, Well nourished  HEENT:  Normocephalic, Atraumatic, Oropharynx moist, No oral exudates,   Nose normal. Neck Supple Carotid: no carotid bruit  Eyes:  Conjunctiva normal, No discharge. Respiratory:    Normal breath sounds, No respiratory distress, No wheezing, no use of accessory muscles, diaphragm movement is normal  No chest Tenderness  Cardiovascular: S1-S2 IRIR  No murmurs auscultated. No rubs, thrills or gallops. Normal  rhythm. Pedal pulses are normal. No pedal edema  GI:  Soft Non tender, non distended. :  No CVA tenderness. Musculoskeletal:   No tenderness, No cyanosis, No clubbing. Integument:  Warm, Dry, No erythema, No rash. Lymphatic:  No lymphadenopathy noted. Neurologic:  Alert & oriented x 3  No focal deficits noted.    Psychiatric:  Affect normal, Judgment normal, Mood normal.       Lab Review     Recent Labs     12/26/20  0405   WBC 9.8   HGB 10.8*   HCT 33.8*         Recent Labs     12/26/20  0405    K 4.0      CO2 26   BUN 11   CREATININE 0.8     Recent Labs     12/26/20  0405   AST 21   ALT 16   BILITOT 0.5   ALKPHOS 84     No results for input(s): TROPONINI in the last 72 hours. No results found for: BNP  Lab Results   Component Value Date    INR 1.02 12/26/2020    PROTIME 12.3 12/26/2020         All labs, images, EKGs were personally reviewed      Assessment: 81 y. o.year old with PMH of  has a past medical history of Arthrofibrosis of total knee arthroplasty ([address] Asthma, CAD (coronary artery disease), CHF (congestive heart failure) ([address] Constipation, DDD (degenerative disc disease), cervical, Diabetes mellitus ([address] Femur fracture, right ([address] GERD (gastroesophageal reflux disease), Hypertension, and Venous stasis. Recommendations:      1. Complete heart block: Patient is currently hemodynamically stable and does not require emergent transvenous pacemaker. Continue with IV dopamine drip as advised. Will consult CVT surgery for permanent pacemaker placement. Avoid AV rodrick blocking agents. Strict ICU monitoring. Echocardiogram ordered will review. 2. Essential hypertension: Blood pressure is 026 systolic patient is also on IV dopamine for bradycardia. Suggest IV hydralazine for systolic blood pressure more than 160. Continue valsartan. Avoid AV rodrick blocking agents. 3. History of COPD: Stable. As needed bronchodilators. 4. Hyperlipidemia: Continue with Lipitor 10 mg daily  5. Obesity: BMI of 30.67. Lifestyle modifications were discussed with the patient.       Thank you for the consult    Dr. Silver Louie  12/26/2020 9:32 AM

## 2020-12-26 NOTE — PROGRESS NOTES
Hospitalist Progress Note      Name:  Ziyad Li /Age/Sex: 1935  (81 y.o. female)   MRN & CSN:  [id number] & [id number] Admission Date/Time: 2020  9:37 PM   Location:  -A PCP: No primary care provider on file. Hospital Day: 2    Assessment and Plan:   Ziyad Li is a 81 y.o.  female  who presents with Bradycardia.        #. Complete heart block:  -external pacer at bedside.  -Patient currently asymptomatic.  -Heart rate ranging 40-45.  -Cardiology consulted. -2D echo ordered.     #. Hypertension:  -Patient is on losartan 100 mg daily, amlodipine.  -Continue losartan, hold amlodipine. -IV hydralazine as needed for SBP> 160, DBP>90.     #. COPD:  -Does not appear to be in exacerbation  -Symbicort twice daily  -Albuterol as needed     #. Chronic respiratory failure on home oxygen as needed     #.  Obesity. Son, Susan Mitchell, called and updated. Diet DIET CARDIAC;   DVT Prophylaxis [] Lovenox, []  Heparin, [] SCDs, [] Ambulation   GI Prophylaxis [] PPI,  [] H2 Blocker,  [] Carafate,  [] Diet/Tube Feeds   Code Status Full Code   Disposition Home   MDM [] Low, [] Moderate,[x]  High     History of Present Illness:   Patient seen and examined. She remains bradycardic, HR 43/min. Ten point ROS reviewed negative, unless as noted above    Objective:   No intake or output data in the 24 hours ending 20 0850   Vitals:   Vitals:    20 0700   BP: (!) 177/60   Pulse: (!) 43   Resp: (!) 33   Temp:    SpO2: 95%     Physical Exam:   GEN Awake female, lying in bed. RESP Clear to auscultation, no wheezes, rales or rhonchi. Symmetric chest movement while on room air. CARDIO/VASC S1/S2 auscultated. Regular bradycardia. No peripheral edema. GI Abdomen is soft without significant tenderness, masses, or guarding. Bowel sounds are normoactive. MSK No gross joint deformities. SKIN Normal coloration, warm, dry.   NEURO  normal speech, no lateralizing weakness. PSYCH Awake, alert, oriented x 3.     Medications:   Medications:    aspirin  81 mg Oral Daily    atorvastatin  10 mg Oral Daily    budesonide-formoterol  2 puff Inhalation BID    ferrous sulfate  325 mg Oral Daily with breakfast    losartan  100 mg Oral Daily    pantoprazole  20 mg Oral Daily    sennosides-docusate sodium  2 tablet Oral Daily    vitamin D  1,000 Units Oral Daily    sodium chloride flush  10 mL Intravenous 2 times per day    enoxaparin  40 mg Subcutaneous Daily      Infusions:    DOPamine       PRN Meds:     hydrALAZINE (APRESOLINE) ivpb, 10 mg, Q4H PRN      albuterol sulfate HFA, 2 puff, Q6H PRN      sodium chloride flush, 10 mL, PRN      promethazine, 12.5 mg, Q6H PRN    Or      ondansetron, 4 mg, Q6H PRN      polyethylene glycol, 17 g, Daily PRN      acetaminophen, 650 mg, Q6H PRN    Or      acetaminophen, 650 mg, Q6H PRN        CBC   Recent Labs     12/25/20  1800 12/26/20  0405   WBC 8.7 9.8   HGB 11.2* 10.8*   HCT 35.6* 33.8*    256      BMP   Recent Labs     12/25/20  1800 12/26/20  0405    138   K 4.0 4.0   CL 99 102   CO2 26 26   BUN 15 11   CREATININE 0.9 0.8       Radiology report reviewed     Electronically signed by Mo Turner MD on 12/26/2020 at 8:50 AM

## 2020-12-26 NOTE — PROGRESS NOTES
Dr Caden Padilla here to see patient . Talked with patient regarding permanent Pacemaker placement, tomorrow Patient  States understanding but remains slightly confused. Son  Ken Diaz called  For consent. Son talked with patient on phone at this time.

## 2020-12-26 NOTE — FLOWSHEET NOTE
Dr. Alexi Armenta bedside. Pt. Examined. Pt. Oriented to self and place. Pt. Has short term memory problems. She is unable to tell this rn where she lives, what medications she's on . Will use paperwork from  [facility] to try and answer some admission questions. Orders given per Dr. Alexi Armenta to start Dopamine gtt if pt. Becomes symptomatic.

## 2020-12-26 NOTE — H&P
HISTORY AND PHYSICAL  (Hospitalist, Internal Medicine)  IDENTIFYING INFORMATION   PATIENT:  Jewel Monterroso  MRN:  [id number]  ADMIT DATE: 12/25/2020      CHIEF COMPLAINT   Patient transferred from [facility] for complete heart block    HISTORY OF PRESENT ILLNESS   Jewel Monterroso is a 81 y.o. female with hypertension, COPD, chronic respiratory failure on oxygen as needed who was transferred to ER from extended care facility due to confusion. Patient is currently awake, alert, oriented x3, appears to have short-term memory loss, reported that she was not feeling right since the last few days and hence her family members wanted her to be checked out. Patient denied any fever, chills, cough, chest pain, shortness of breath, denied any dizziness, lightheadedness. Patient reported having a fall 2 months ago, could not recall how she fell down-thinks that she might of tripped and fell. Patient reports that she usually ambulates without any assistance. Initial vitals in [facility] ED-/88, HR 44, RR 18, temperature 97.5, saturating 97% on room air. EKG revealed third-degree heart block. Cardiology was consulted and patient transferred to [facility].  CBC, CMP within normal limits except hemoglobin 11.2, troponin less than 0.010. X-ray chest-cardiomegaly. Rapid Covid negative. PAST MEDICAL HISTORY PAST SURGICAL HISTORY   hypertension, COPD, chronic respiratory failure on oxygen   tonsillectomy, knee surgery-patient reports excision of the bone growth   FAMILY HISTORY SOCIAL HISTORY    Reviewed and noncontributory   denies smoking, alcohol, illicit abuse   MEDICATIONS ALLERGIES    Patient could not recall her medications- as per EMR-amlodipine 5 mg daily, aspirin 81 mg daily, atorvastatin 10 mg daily, Symbicort twice daily, ferrous sulfate 325 mg daily, losartan 100 mg daily, Protonix 20 mg daily, albuterol HFA as needed, Senokot, vitamin D patient could recall that she is allergic to penicillin-reported rash.   as per EMR lisinopril, penicillin, sulfa antibiotics       PAST MEDICAL, SURGICAL, FAMILY, and SOCIAL HISTORY         Past Medical History:   Diagnosis Date    [facility]     bilateral    Asthma     CAD (coronary artery disease)     [facility]     Constipation     DDD (degenerative disc disease), cervical     [facility]     Femur fracture, right (HCC)     distal femur    GERD (gastroesophageal reflux disease)     Hypertension     Venous stasis      Past Surgical History:   Procedure Laterality Date    APPENDECTOMY      KNEE SURGERY       No family history on file.   Family Hx of HTN  Family Hx as reviewed above, otherwise non-contributory  Social History     Socioeconomic History    Marital status:      Spouse name: Not on file    Number of children: Not on file    Years of education: Not on file    Highest education level: Not on file   Occupational History    Not on file   Social Needs    Financial resource strain: Not on file    Food insecurity     Worry: Not on file     Inability: Not on file   [employer] needs     Medical: Not on file     Non-medical: Not on file   Tobacco Use    Smoking status: Never Smoker    Smokeless tobacco: Never Used   Substance and Sexual Activity    Alcohol use: Not Currently    Drug use: Never    Sexual activity: Not Currently   Lifestyle    Physical activity     Days per week: Not on file     Minutes per session: Not on file    Stress: Not on file   Relationships    Social connections     Talks on phone: Not on file     Gets together: Not on file     Attends [employer] service: Not on file     Active member of club or organization: Not on file     Attends meetings of clubs or organizations: Not on file     Relationship status: Not on file    Intimate partner violence     Fear of current or ex partner: Not on file     Emotionally abused: Not on file     Physically abused: Not on file     Forced sexual activity: Not on file   Other Topics Concern    Not on file   Social History Narrative    Not on file       MEDICATIONS   Medications Prior to Admission  Medications Prior to Admission: amLODIPine (NORVASC) 5 MG tablet, Take 5 mg by mouth daily  aspirin 81 MG chewable tablet, Take 81 mg by mouth daily  atorvastatin (LIPITOR) 10 MG tablet, Take 10 mg by mouth daily  budesonide-formoterol (SYMBICORT) 160-4.5 MCG/ACT AERO, Inhale 2 puffs into the lungs 2 times daily  ferrous sulfate (IRON 325) 325 (65 Fe) MG tablet, Take 325 mg by mouth daily (with breakfast)  losartan (COZAAR) 100 MG tablet, Take 100 mg by mouth daily  pantoprazole (PROTONIX) 20 MG tablet, Take 20 mg by mouth daily  albuterol sulfate HFA (VENTOLIN HFA) 108 (90 Base) MCG/ACT inhaler, Inhale 2 puffs into the lungs every 6 hours as needed for Wheezing  sennosides-docusate sodium (SENOKOT-S) 8.6-50 MG tablet, Take 2 tablets by mouth daily  vitamin D (CHOLECALCIFEROL) 25 MCG (1000 UT) TABS tablet, Take 1,000 Units by mouth daily    Current Medications  Current Facility-Administered Medications   Medication Dose Route Frequency Provider Last Rate Last Admin    sodium chloride flush 0.9 % injection 10 mL  10 mL Intravenous 2 times per day Jacob Hartmann MD        sodium chloride flush 0.9 % injection 10 mL  10 mL Intravenous PRN MD Gallo Coleman [START ON 12/26/2020] enoxaparin (LOVENOX) injection 40 mg  40 mg Subcutaneous Daily Jacob Hartmann MD        promethazine (PHENERGAN) tablet 12.5 mg  12.5 mg Oral Q6H PRN Jacob Hartmann MD        Or    ondansetron (ZOFRAN) injection 4 mg  4 mg Intravenous Q6H PRN Jacob Hartmann MD        polyethylene glycol (GLYCOLAX) packet 17 g  17 g Oral Daily PRN Jacob Hartmann MD        acetaminophen (TYLENOL) tablet 650 mg  650 mg Oral Q6H PRN Jacob Hartmann MD        Or    acetaminophen (TYLENOL) suppository 650 mg  650 mg Rectal Q6H PRN Creatinine Latest Ref Range: 0.6 - 1.1 MG/DL 0.9   Anion Gap Latest Ref Range: 4 - 16  11   GFR Non- Latest Ref Range: >60 mL/min/1.73m2 60 (L)   GFR  Latest Ref Range: >60 mL/min/1.73m2 >60   Glucose Latest Ref Range: 70 - 99 MG/ (H)   Calcium Latest Ref Range: 8.3 - 10.6 MG/DL 10.5   Total Protein Latest Ref Range: 6.4 - 8.2 GM/DL 6.4   Troponin T Latest Ref Range: <0.01 NG/ML <0.010   Albumin Latest Ref Range: 3.4 - 5.0 GM/DL 3.8   Alk Phos Latest Ref Range: 40 - 129 IU/L 85   ALT Latest Ref Range: 10 - 40 U/L 19   AST Latest Ref Range: 15 - 37 IU/L 28   Bilirubin Latest Ref Range: 0.0 - 1.0 MG/DL 0.4   WBC Latest Ref Range: 4.0 - 10.5 K/CU MM 8.7   RBC Latest Ref Range: 4.2 - 5.4 M/CU MM 3.67 (L)   Hemoglobin Quant Latest Ref Range: 12.5 - 16.0 GM/DL 11.2 (L)   Hematocrit Latest Ref Range: 37 - 47 % 35.6 (L)   MCV Latest Ref Range: 78 - 100 FL 97.0   MCH Latest Ref Range: 27 - 31 PG 30.5   MCHC Latest Ref Range: 32.0 - 36.0 % 31.5 (L)   MPV Latest Ref Range: 7.5 - 11.1 FL 9.7   RDW Latest Ref Range: 11.7 - 14.9 % 13.5   Platelet Count Latest Ref Range: 140 - 440 K/CU    Lymphocyte % Latest Ref Range: 24 - 44 % 24.2   Monocytes % Latest Ref Range: 0 - 4 % 11.9 (H)   Eosinophils % Latest Ref Range: 0 - 3 % 3.4 (H)   Basophils % Latest Ref Range: 0 - 1 % 1.0   Lymphocytes Absolute Latest Units: K/CU MM 2.1   Monocytes Absolute Latest Units: K/CU MM 1.0   Eosinophils Absolute Latest Units: K/CU MM 0.3   Basophils Absolute Latest Units: K/CU MM 0.1   Differential Type Unknown AUTOMATED DIFFERENTIAL   Segs Relative Latest Ref Range: 36 - 66 % 59.3   Segs Absolute Latest Units: K/CU MM 5.2   Immature Neutrophil % Latest Ref Range: 0 - 0.43 % 0.2   Total Immature Neutrophil Latest Units: K/CU MM 0.02     Results for Winthrop Duverney (MRN [id number]) as of 12/26/2020 01:35   Ref.  Range 12/25/2020 18:20   Prothrombin Time Latest Ref Range: 11.7 - 14.5 SECONDS 12.5   INR Latest Units: INDEX 1.09   aPTT Latest Ref Range: 25.1 - 37.1 SECONDS 40.9 (H)     Results for Dangelo Jimenez (MRN [id number]) as of 12/26/2020 01:35   Ref. Range 12/25/2020 17:30   SARS-CoV-2, NAAT Unknown NOT DETECTED     Recent Imaging    XR CHEST PORTABLE    Narrative   EXAMINATION:   ONE XRAY VIEW OF THE CHEST       12/25/2020 5:27 pm       COMPARISON:   None.       HISTORY:   ORDERING SYSTEM PROVIDED HISTORY: cough   TECHNOLOGIST PROVIDED HISTORY:   Reason for exam:->cough   Acuity: Acute   Type of Exam: Initial   Additional signs and symptoms: cough       FINDINGS:   The cardiac silhouette is moderately enlarged.  Nonspecific right   paratracheal opacity.  No pneumothorax, vascular congestion, consolidation,   or pleural effusion is identified.  No acute osseous abnormality.           Impression   1. Nonspecific right paratracheal opacity.  Recommend further evaluation with   CT. 2. Moderately enlarged cardiac silhouette. Relevant labs and imaging reviewed    ASSESSMENT AND PLAN     #. Complete heart block:  -ER vision discussed with the cardiology-Dr. Blake-recommended dopamine drip if needed. -external pacer at bedside.  -Patient currently asymptomatic.  -Heart rate ranging 40-45.  -Cardiology consult in place  -2D echo ordered. #.  Hypertension:  -Patient is on losartan 100 mg daily, amlodipine.  -Continue losartan, hold amlodipine. -IV hydralazine as needed for SBP> 160, DBP>90. #.  COPD:  -Does not appear to be in exacerbation  -Symbicort twice daily  -Albuterol as needed    #. Chronic respiratory failure on home oxygen as needed    #.  Obesity. DVT Prophylaxis: Lovenox  GI Prophylaxis: Not indicated  Code Status: FULL.     Case d/w ED physician    Tawana Velazquez MD  Hospitalist, Internal Medicine  12/25/2020 at 9:51 PM

## 2020-12-26 NOTE — PROGRESS NOTES
Chest pain continues and is now # 8 on 1-10 scale.   Dr Kathie Lazcano called, new order  For NTG gtt received

## 2020-12-27 ENCOUNTER — APPOINTMENT (OUTPATIENT)
Dept: GENERAL RADIOLOGY | Age: 85
DRG: 242 | End: 2020-12-27
Attending: INTERNAL MEDICINE
Payer: MEDICARE

## 2020-12-27 ENCOUNTER — ANESTHESIA (OUTPATIENT)
Dept: OPERATING ROOM | Age: 85
DRG: 242 | End: 2020-12-27
Payer: MEDICARE

## 2020-12-27 ENCOUNTER — ANESTHESIA EVENT (OUTPATIENT)
Dept: OPERATING ROOM | Age: 85
DRG: 242 | End: 2020-12-27
Payer: MEDICARE

## 2020-12-27 VITALS
OXYGEN SATURATION: 94 % | SYSTOLIC BLOOD PRESSURE: 160 MMHG | DIASTOLIC BLOOD PRESSURE: 82 MMHG | RESPIRATION RATE: 1 BRPM

## 2020-12-27 LAB
ANION GAP SERPL CALCULATED.3IONS-SCNC: 7 MMOL/L (ref 4–16)
BUN BLDV-MCNC: 13 MG/DL (ref 6–23)
CALCIUM SERPL-MCNC: 10.4 MG/DL (ref 8.3–10.6)
CHLORIDE BLD-SCNC: 99 MMOL/L (ref 99–110)
CO2: 26 MMOL/L (ref 21–32)
CREAT SERPL-MCNC: 0.8 MG/DL (ref 0.6–1.1)
EKG ATRIAL RATE: 43 BPM
EKG ATRIAL RATE: 72 BPM
EKG DIAGNOSIS: NORMAL
EKG DIAGNOSIS: NORMAL
EKG P AXIS: -4 DEGREES
EKG P AXIS: 33 DEGREES
EKG Q-T INTERVAL: 502 MS
EKG Q-T INTERVAL: 550 MS
EKG QRS DURATION: 154 MS
EKG QRS DURATION: 164 MS
EKG QTC CALCULATION (BAZETT): 419 MS
EKG QTC CALCULATION (BAZETT): 459 MS
EKG R AXIS: -12 DEGREES
EKG R AXIS: -8 DEGREES
EKG T AXIS: -11 DEGREES
EKG T AXIS: -17 DEGREES
EKG VENTRICULAR RATE: 42 BPM
EKG VENTRICULAR RATE: 42 BPM
GFR AFRICAN AMERICAN: >60 ML/MIN/1.73M2
GFR NON-AFRICAN AMERICAN: >60 ML/MIN/1.73M2
GLUCOSE BLD-MCNC: 141 MG/DL (ref 70–99)
MAGNESIUM: 2 MG/DL (ref 1.8–2.4)
POTASSIUM SERPL-SCNC: 4.1 MMOL/L (ref 3.5–5.1)
SODIUM BLD-SCNC: 132 MMOL/L (ref 135–145)

## 2020-12-27 PROCEDURE — 02HK3JZ INSERTION OF PACEMAKER LEAD INTO RIGHT VENTRICLE, PERCUTANEOUS APPROACH: ICD-10-PCS | Performed by: SURGERY

## 2020-12-27 PROCEDURE — 6370000000 HC RX 637 (ALT 250 FOR IP): Performed by: INTERNAL MEDICINE

## 2020-12-27 PROCEDURE — 02HL3JZ INSERTION OF PACEMAKER LEAD INTO LEFT VENTRICLE, PERCUTANEOUS APPROACH: ICD-10-PCS | Performed by: SURGERY

## 2020-12-27 PROCEDURE — 2700000000 HC OXYGEN THERAPY PER DAY

## 2020-12-27 PROCEDURE — 6360000002 HC RX W HCPCS: Performed by: INTERNAL MEDICINE

## 2020-12-27 PROCEDURE — 94761 N-INVAS EAR/PLS OXIMETRY MLT: CPT

## 2020-12-27 PROCEDURE — 2060000000 HC ICU INTERMEDIATE R&B

## 2020-12-27 PROCEDURE — 6360000002 HC RX W HCPCS: Performed by: NURSE ANESTHETIST, CERTIFIED REGISTERED

## 2020-12-27 PROCEDURE — 2580000003 HC RX 258: Performed by: SURGERY

## 2020-12-27 PROCEDURE — 2500000003 HC RX 250 WO HCPCS: Performed by: SURGERY

## 2020-12-27 PROCEDURE — 2000000000 HC ICU R&B

## 2020-12-27 PROCEDURE — 3700000001 HC ADD 15 MINUTES (ANESTHESIA): Performed by: SURGERY

## 2020-12-27 PROCEDURE — 71045 X-RAY EXAM CHEST 1 VIEW: CPT

## 2020-12-27 PROCEDURE — 6360000002 HC RX W HCPCS: Performed by: SURGERY

## 2020-12-27 PROCEDURE — 3600000003 HC SURGERY LEVEL 3 BASE: Performed by: SURGERY

## 2020-12-27 PROCEDURE — 83735 ASSAY OF MAGNESIUM: CPT

## 2020-12-27 PROCEDURE — 6360000002 HC RX W HCPCS: Performed by: PHYSICIAN ASSISTANT

## 2020-12-27 PROCEDURE — 3600000013 HC SURGERY LEVEL 3 ADDTL 15MIN: Performed by: SURGERY

## 2020-12-27 PROCEDURE — 93010 ELECTROCARDIOGRAM REPORT: CPT | Performed by: INTERNAL MEDICINE

## 2020-12-27 PROCEDURE — C1785 PMKR, DUAL, RATE-RESP: HCPCS | Performed by: SURGERY

## 2020-12-27 PROCEDURE — 2580000003 HC RX 258: Performed by: NURSE ANESTHETIST, CERTIFIED REGISTERED

## 2020-12-27 PROCEDURE — 94640 AIRWAY INHALATION TREATMENT: CPT

## 2020-12-27 PROCEDURE — C1898 LEAD, PMKR, OTHER THAN TRANS: HCPCS | Performed by: SURGERY

## 2020-12-27 PROCEDURE — 0JH606Z INSERTION OF PACEMAKER, DUAL CHAMBER INTO CHEST SUBCUTANEOUS TISSUE AND FASCIA, OPEN APPROACH: ICD-10-PCS | Performed by: SURGERY

## 2020-12-27 PROCEDURE — 80048 BASIC METABOLIC PNL TOTAL CA: CPT

## 2020-12-27 PROCEDURE — 3700000000 HC ANESTHESIA ATTENDED CARE: Performed by: SURGERY

## 2020-12-27 PROCEDURE — 7100000000 HC PACU RECOVERY - FIRST 15 MIN

## 2020-12-27 PROCEDURE — 2709999900 HC NON-CHARGEABLE SUPPLY: Performed by: SURGERY

## 2020-12-27 PROCEDURE — 76000 FLUOROSCOPY <1 HR PHYS/QHP: CPT

## 2020-12-27 PROCEDURE — 99232 SBSQ HOSP IP/OBS MODERATE 35: CPT | Performed by: INTERNAL MEDICINE

## 2020-12-27 DEVICE — PACEMAKER CARD 22.5GM W50.8XH46.6MM D7.4MM TI POLYUR SIL: Type: IMPLANTABLE DEVICE | Site: CHEST | Status: FUNCTIONAL

## 2020-12-27 DEVICE — LEAD PACE AD 6FR L58CM VENT SIL PLAT INSUL BPLR PLATINIZED 507658] MEDTRONIC CARDIAC RTHYM MGT]: Type: IMPLANTABLE DEVICE | Site: CHEST | Status: FUNCTIONAL

## 2020-12-27 DEVICE — IMPLANTABLE DEVICE: Type: IMPLANTABLE DEVICE | Site: CHEST | Status: FUNCTIONAL

## 2020-12-27 RX ORDER — LIDOCAINE HYDROCHLORIDE 20 MG/ML
INJECTION, SOLUTION INTRAVENOUS PRN
Status: DISCONTINUED | OUTPATIENT
Start: 2020-12-27 | End: 2020-12-27 | Stop reason: SDUPTHER

## 2020-12-27 RX ORDER — SODIUM CHLORIDE 0.9 % (FLUSH) 0.9 %
10 SYRINGE (ML) INJECTION PRN
Status: DISCONTINUED | OUTPATIENT
Start: 2020-12-27 | End: 2020-12-29 | Stop reason: HOSPADM

## 2020-12-27 RX ORDER — EPINEPHRINE 1 MG/ML
INJECTION, SOLUTION, CONCENTRATE INTRAVENOUS PRN
Status: DISCONTINUED | OUTPATIENT
Start: 2020-12-27 | End: 2020-12-27 | Stop reason: SDUPTHER

## 2020-12-27 RX ORDER — HYDROCHLOROTHIAZIDE 12.5 MG/1
12.5 TABLET ORAL DAILY
Status: DISCONTINUED | OUTPATIENT
Start: 2020-12-27 | End: 2020-12-29 | Stop reason: HOSPADM

## 2020-12-27 RX ORDER — HYDROXYZINE HYDROCHLORIDE 50 MG/ML
25 INJECTION, SOLUTION INTRAMUSCULAR ONCE
Status: COMPLETED | OUTPATIENT
Start: 2020-12-27 | End: 2020-12-27

## 2020-12-27 RX ORDER — VANCOMYCIN HYDROCHLORIDE 1 G/20ML
1 INJECTION, POWDER, LYOPHILIZED, FOR SOLUTION INTRAVENOUS ONCE
Status: DISCONTINUED | OUTPATIENT
Start: 2020-12-27 | End: 2020-12-27

## 2020-12-27 RX ORDER — SODIUM CHLORIDE 9 MG/ML
INJECTION, SOLUTION INTRAVENOUS CONTINUOUS PRN
Status: DISCONTINUED | OUTPATIENT
Start: 2020-12-27 | End: 2020-12-27 | Stop reason: SDUPTHER

## 2020-12-27 RX ORDER — PROPOFOL 10 MG/ML
INJECTION, EMULSION INTRAVENOUS PRN
Status: DISCONTINUED | OUTPATIENT
Start: 2020-12-27 | End: 2020-12-27 | Stop reason: SDUPTHER

## 2020-12-27 RX ORDER — LIDOCAINE HYDROCHLORIDE 10 MG/ML
INJECTION, SOLUTION INFILTRATION; PERINEURAL
Status: COMPLETED | OUTPATIENT
Start: 2020-12-27 | End: 2020-12-27

## 2020-12-27 RX ORDER — HYDRALAZINE HYDROCHLORIDE 20 MG/ML
INJECTION INTRAMUSCULAR; INTRAVENOUS PRN
Status: DISCONTINUED | OUTPATIENT
Start: 2020-12-27 | End: 2020-12-27 | Stop reason: SDUPTHER

## 2020-12-27 RX ORDER — SODIUM CHLORIDE 0.9 % (FLUSH) 0.9 %
10 SYRINGE (ML) INJECTION EVERY 12 HOURS SCHEDULED
Status: DISCONTINUED | OUTPATIENT
Start: 2020-12-27 | End: 2020-12-29 | Stop reason: HOSPADM

## 2020-12-27 RX ADMIN — PANTOPRAZOLE SODIUM 20 MG: 20 TABLET, DELAYED RELEASE ORAL at 11:38

## 2020-12-27 RX ADMIN — HYDRALAZINE HYDROCHLORIDE 10 MG: 20 INJECTION INTRAMUSCULAR; INTRAVENOUS at 08:13

## 2020-12-27 RX ADMIN — Medication 1000 UNITS: at 11:38

## 2020-12-27 RX ADMIN — DOCUSATE SODIUM AND SENNOSIDES 2 TABLET: 8.6; 5 TABLET, FILM COATED ORAL at 11:45

## 2020-12-27 RX ADMIN — SODIUM CHLORIDE: 900 INJECTION INTRAVENOUS at 09:24

## 2020-12-27 RX ADMIN — BUDESONIDE AND FORMOTEROL FUMARATE DIHYDRATE 2 PUFF: 160; 4.5 AEROSOL RESPIRATORY (INHALATION) at 21:39

## 2020-12-27 RX ADMIN — ATORVASTATIN CALCIUM 10 MG: 10 TABLET, FILM COATED ORAL at 11:39

## 2020-12-27 RX ADMIN — LIDOCAINE HYDROCHLORIDE 100 MG: 20 INJECTION, SOLUTION INTRAVENOUS at 10:16

## 2020-12-27 RX ADMIN — FERROUS SULFATE TAB 325 MG (65 MG ELEMENTAL FE) 325 MG: 325 (65 FE) TAB at 11:38

## 2020-12-27 RX ADMIN — LOSARTAN POTASSIUM 100 MG: 100 TABLET, FILM COATED ORAL at 11:36

## 2020-12-27 RX ADMIN — HYDRALAZINE HYDROCHLORIDE 5 MG: 20 INJECTION INTRAMUSCULAR; INTRAVENOUS at 10:33

## 2020-12-27 RX ADMIN — VANCOMYCIN HYDROCHLORIDE 1 G: 1 INJECTION, POWDER, LYOPHILIZED, FOR SOLUTION INTRAVENOUS at 09:35

## 2020-12-27 RX ADMIN — EPINEPHRINE 20 MCG: 1 INJECTION, SOLUTION, CONCENTRATE INTRAVENOUS at 09:48

## 2020-12-27 RX ADMIN — ENOXAPARIN SODIUM 40 MG: 40 INJECTION SUBCUTANEOUS at 11:41

## 2020-12-27 RX ADMIN — PROPOFOL 40 MG: 10 INJECTION, EMULSION INTRAVENOUS at 09:37

## 2020-12-27 RX ADMIN — ASPIRIN 81 MG CHEWABLE TABLET 81 MG: 81 TABLET CHEWABLE at 11:37

## 2020-12-27 RX ADMIN — HYDROCHLOROTHIAZIDE 12.5 MG: 12.5 TABLET ORAL at 15:10

## 2020-12-27 RX ADMIN — LIDOCAINE HYDROCHLORIDE 60 MG: 20 INJECTION, SOLUTION INTRAVENOUS at 09:33

## 2020-12-27 RX ADMIN — SODIUM CHLORIDE, PRESERVATIVE FREE 10 ML: 5 INJECTION INTRAVENOUS at 11:40

## 2020-12-27 RX ADMIN — SODIUM CHLORIDE, PRESERVATIVE FREE 10 ML: 5 INJECTION INTRAVENOUS at 21:21

## 2020-12-27 RX ADMIN — HYDROXYZINE HYDROCHLORIDE 25 MG: 50 INJECTION, SOLUTION INTRAMUSCULAR at 02:15

## 2020-12-27 RX ADMIN — DOPAMINE HYDROCHLORIDE 2.5 MCG/KG/MIN: 160 INJECTION, SOLUTION INTRAVENOUS at 06:35

## 2020-12-27 ASSESSMENT — PULMONARY FUNCTION TESTS
PIF_VALUE: 0
PIF_VALUE: 1
PIF_VALUE: 0
PIF_VALUE: 1
PIF_VALUE: 0
PIF_VALUE: 1
PIF_VALUE: 0
PIF_VALUE: 1
PIF_VALUE: 0
PIF_VALUE: 1
PIF_VALUE: 0
PIF_VALUE: 1
PIF_VALUE: 0

## 2020-12-27 ASSESSMENT — PAIN - FUNCTIONAL ASSESSMENT
PAIN_FUNCTIONAL_ASSESSMENT: ACTIVITIES ARE NOT PREVENTED
PAIN_FUNCTIONAL_ASSESSMENT: ACTIVITIES ARE NOT PREVENTED

## 2020-12-27 ASSESSMENT — PAIN DESCRIPTION - PROGRESSION
CLINICAL_PROGRESSION: NOT CHANGED

## 2020-12-27 ASSESSMENT — PAIN SCALES - GENERAL
PAINLEVEL_OUTOF10: 2
PAINLEVEL_OUTOF10: 0

## 2020-12-27 ASSESSMENT — PAIN DESCRIPTION - ORIENTATION
ORIENTATION: LEFT;UPPER
ORIENTATION: LEFT
ORIENTATION: LEFT;UPPER

## 2020-12-27 ASSESSMENT — PAIN DESCRIPTION - PAIN TYPE: TYPE: SURGICAL PAIN

## 2020-12-27 ASSESSMENT — PAIN DESCRIPTION - DESCRIPTORS: DESCRIPTORS: SORE;DISCOMFORT

## 2020-12-27 ASSESSMENT — PAIN DESCRIPTION - LOCATION: LOCATION: CHEST

## 2020-12-27 NOTE — ANESTHESIA POSTPROCEDURE EVALUATION
Department of Anesthesiology  Postprocedure Note    Patient: Olga Arroyo  MRN: [id number]  YOB: 1935  Date of evaluation: 12/27/2020  Time:  11:53 AM     Procedure Summary     Date: 12/27/20 Room / Location: [address]    Anesthesia Start: 1040 Anesthesia Stop:     Procedure: PACEMAKER INSERTION PERMANENT (N/A Chest) Diagnosis: (Irregular heart rate)    Surgeons: Windy Mera MD Responsible Provider: Marcell Sicard, MD    Anesthesia Type: MAC ASA Status: 4          Anesthesia Type: MAC    Chely Phase I: Chely Score: 8    Chely Phase II:      Last vitals: Reviewed and per EMR flowsheets.        Anesthesia Post Evaluation    Patient location during evaluation: PACU  Patient participation: complete - patient participated  Level of consciousness: awake  Pain score: 3  Airway patency: patent  Nausea & Vomiting: no vomiting and no nausea  Complications: no  Cardiovascular status: blood pressure returned to baseline and hemodynamically stable  Respiratory status: acceptable  Hydration status: stable

## 2020-12-27 NOTE — OP NOTE
Date of Procedure: 12/27/20      Surgeon: Ana M Acharya MD    Anesthesia: MAC    Preoperative Diagnosis: Complete heart block [facility]) [I44.2]  Active Hospital Problems    Diagnosis Date Noted    Complete heart block ([address] [I44.2] 12/25/2020      Bradycardia with syncope    Postoperative Diagnosis: Complete heart block ([address] [I44.2]   Active Hospital Problems    Diagnosis Date Noted    Complete heart block [facility]) [I44.2] 12/25/2020       Operation:  Permanent Dual Chamber Pacemaker (DDD)     Details of Procedure:  Patient was brought to the operating room after request from cardiology and preoperative evaluations and discussions with patient and family. Time out per checklist confirmed. Left infraclavicular area was prepared and draped. Local anesthesia was infiltrated with 1% Lidocaine. Incision was made below the clavicle and the cephalic vein was dissected. Both atrial and ventricle leads were introduced via cephalic vein. Atrial and ventricle leads were placed in the right atrial appendage and RV apex under fluoroscopy guidance. Thresholds as recorded were obtained. There was no diaphragmatic pacing at 10V on either lead. As soon as the LV lead was introduced into the RV patient became asystolic requiring made a connection to the temporary pacer connection which was ultimately connected to the pacemaker generator  Attempted to disconnect the lead again and try to reposition for better thresholds however patient did not have again any intrinsic QRS complex  Leads were secured to the cephalic vein and pectoralis fascia. Leads were connected to their respective locations on the [employer] generator. The generator was placed in a pocked under the lower skin flap and anchored to the pectoralis fascia. Wound was irrigated with antibiotic solution and closed in layers. Blood loss was minimal. Sponge and instrument count was correct before closure.     The lead thresholds were rechecked after closure

## 2020-12-27 NOTE — FLOWSHEET NOTE
Continued, increasing confusion. Pt. Nonstop pulling at wires, equipment, attempting to climb OOB. Attempt to apply restraints to pt. . Pt. Is yelling, kicking at the nurses, fighting, grabbing restraints. Mitch Hamilton rn bedside attempting to help apply restraints. Mona CASILLAS bedside. Vistaril 25 mg IM ordered.

## 2020-12-27 NOTE — PROGRESS NOTES
ml   Net 714.5 ml       Physical Exam:    General Appearance:  No distress, conversant  Constitutional:  Well developed, Well nourished  HEENT:  Normocephalic, Atraumatic, Oropharynx moist, No oral exudates,   Nose normal. Neck Supple Carotid: no carotid bruit  Eyes:  Conjunctiva normal, No discharge. Respiratory:    Normal breath sounds, No respiratory distress, No wheezing, no use of accessory muscles, diaphragm movement is normal  No chest Tenderness  Cardiovascular: S1-S2   No murmurs auscultated. No rubs, thrills or gallops. Normal  rhythm. Pedal pulses are normal. No pedal edema  GI:  Soft Non tender, non distended. :  No CVA tenderness. Musculoskeletal:   No tenderness, No cyanosis, No clubbing. Integument:  Warm, Dry, No erythema, No rash. Lymphatic:  No lymphadenopathy noted. Neurologic:  Alert & oriented x 3  No focal deficits noted.    Psychiatric:  Affect normal, Judgment normal, Mood normal.       MEDICATIONS:     sodium chloride flush  10 mL Intravenous 2 times per day    aspirin  81 mg Oral Daily    atorvastatin  10 mg Oral Daily    budesonide-formoterol  2 puff Inhalation BID    ferrous sulfate  325 mg Oral Daily with breakfast    losartan  100 mg Oral Daily    pantoprazole  20 mg Oral Daily    sennosides-docusate sodium  2 tablet Oral Daily    vitamin D  1,000 Units Oral Daily    enoxaparin  40 mg Subcutaneous Daily       sodium chloride flush, albuterol sulfate HFA, hydrALAZINE, promethazine **OR** ondansetron, polyethylene glycol, acetaminophen **OR** acetaminophen  Allergies   Allergen Reactions    Lisinopril     Pcn [Penicillins]     Sulfa Antibiotics        Lab Data:  CBC:   Recent Labs     12/25/20  1800 12/26/20  0405   WBC 8.7 9.8   HGB 11.2* 10.8*   HCT 35.6* 33.8*   MCV 97.0 94.9    256     BMP:   Recent Labs     12/25/20  1800 12/26/20  0405 12/27/20  0740    138 132*   K 4.0 4.0 4.1   CL 99 102 99   CO2 26 26 26   BUN 15 11 13   CREATININE 0.9 0.8 0. 8     LIVER PROFILE:   Recent Labs     12/25/20  1800 12/26/20  0405   AST 28 21   ALT 19 16   BILITOT 0.4 0.5   ALKPHOS 85 84     PT/INR:   Recent Labs     12/25/20  1820 12/26/20  0405   PROTIME 12.5 12.3   INR 1.09 1.02     APTT:   Recent Labs     12/25/20  1820 12/26/20  0405   APTT 40.9* 40.2*         Felice Braxton MD 12/27/2020 1:39 PM

## 2020-12-27 NOTE — PROGRESS NOTES
Hospitalist Progress Note      Name:  Fayetta Romberg /Age/Sex: 1935  (81 y.o. female)   MRN & CSN:  [id number] & [id number] Admission Date/Time: 2020  9:37 PM   Location:  -A PCP: No primary care provider on file. Hospital Day: 3    Assessment and Plan:   Fayetta Romberg is a 81 y.o.  female  who presents with Bradycardia.        #. Complete heart block:  -external pacer at bedside. Currently hemodynamically stable. On dopamine drip  CT surgery planning for permanent pacemaker placement. Cardiology input appreciated.     #. Hypertension:  Poorly controlled. On nitroglycerin drip and losartan  Cardiology on board.     #. COPD:  -Does not appear to be in exacerbation  -Symbicort twice daily  -Albuterol as needed    #Encephalopathy  She is confused during the day and it gets worse at night. She is currently physically restrained because she was pulling at her lines and telemetry leads. Plan-frequent reorientation by nursing staff. As needed antipsychotics for severe agitation.      #. Chronic respiratory failure on home oxygen as needed  #.  Obesity. Diet Diet NPO Time Specified   DVT Prophylaxis [] Lovenox, []  Heparin, [] SCDs, [] Ambulation   GI Prophylaxis [] PPI,  [] H2 Blocker,  [] Carafate,  [] Diet/Tube Feeds   Code Status Full Code   Disposition Home   MDM [] Low, [] Moderate,[x]  High     History of Present Illness:   Patient seen and examined. She remains bradycardic. She was agitated overnight and had to be physically restrained. Ten point ROS reviewed negative, unless as noted above    Objective: Intake/Output Summary (Last 24 hours) at 2020 0845  Last data filed at 2020 0630  Gross per 24 hour   Intake 914.5 ml   Output 500 ml   Net 414.5 ml      Vitals:   Vitals:    20 0800   BP: (!) 173/62   Pulse: (!) 38   Resp: 16   Temp:    SpO2: 96%     Physical Exam:   GEN Awake female, lying in bed.   RESP Clear to auscultation, no wheezes, rales or rhonchi. Symmetric chest movement while on room air. CARDIO/VASC S1/S2 auscultated. Regular bradycardia. No peripheral edema. GI Abdomen is soft without significant tenderness, masses, or guarding. Bowel sounds are normoactive. MSK No gross joint deformities. SKIN Normal coloration, warm, dry. NEURO  normal speech, no lateralizing weakness. PSYCH Awake, alert, oriented x 3.     Medications:   Medications:    aspirin  81 mg Oral Daily    atorvastatin  10 mg Oral Daily    budesonide-formoterol  2 puff Inhalation BID    ferrous sulfate  325 mg Oral Daily with breakfast    losartan  100 mg Oral Daily    pantoprazole  20 mg Oral Daily    sennosides-docusate sodium  2 tablet Oral Daily    vitamin D  1,000 Units Oral Daily    sodium chloride flush  10 mL Intravenous 2 times per day    enoxaparin  40 mg Subcutaneous Daily      Infusions:    nitroGLYCERIN 10 mcg/min (12/26/20 1510)    DOPamine 2.5 mcg/kg/min (12/27/20 0635)     PRN Meds:     albuterol sulfate HFA, 2 puff, Q6H PRN      hydrALAZINE, 10 mg, Q4H PRN      sodium chloride flush, 10 mL, PRN      promethazine, 12.5 mg, Q6H PRN    Or      ondansetron, 4 mg, Q6H PRN      polyethylene glycol, 17 g, Daily PRN      acetaminophen, 650 mg, Q6H PRN    Or      acetaminophen, 650 mg, Q6H PRN        CBC   Recent Labs     12/25/20  1800 12/26/20  0405   WBC 8.7 9.8   HGB 11.2* 10.8*   HCT 35.6* 33.8*    256      BMP   Recent Labs     12/25/20  1800 12/26/20  0405    138   K 4.0 4.0   CL 99 102   CO2 26 26   BUN 15 11   CREATININE 0.9 0.8       Radiology report reviewed     Electronically signed by Mo Turner MD on 12/27/2020 at 8:45 AM

## 2020-12-27 NOTE — FLOWSHEET NOTE
Attempt to cliimb OOB. Assist repositioning , reminded pt. That she has a purewick cathetar in place. Bed alarm reset.

## 2020-12-27 NOTE — ANESTHESIA PRE PROCEDURE
Department of Anesthesiology  Preprocedure Note       Name:  Mango Troy   Age:  81 y.o.  :  1935                                          MRN:  [id number]         Date:  2020      Surgeon: Nguyen Stapleton):  Otis Lopez MD    Procedure: Procedure(s):  PACEMAKER INSERTION PERMANENT    Medications prior to admission:   Prior to Admission medications    Medication Sig Start Date End Date Taking?  Authorizing Provider   amLODIPine (NORVASC) 5 MG tablet Take 5 mg by mouth daily    Historical Provider, MD   aspirin 81 MG chewable tablet Take 81 mg by mouth daily    Historical Provider, MD   atorvastatin (LIPITOR) 10 MG tablet Take 10 mg by mouth daily    Historical Provider, MD   budesonide-formoterol (SYMBICORT) 160-4.5 MCG/ACT AERO Inhale 2 puffs into the lungs 2 times daily    Historical Provider, MD   ferrous sulfate (IRON 325) 325 (65 Fe) MG tablet Take 325 mg by mouth daily (with breakfast)    Historical Provider, MD   losartan (COZAAR) 100 MG tablet Take 100 mg by mouth daily    Historical Provider, MD   pantoprazole (PROTONIX) 20 MG tablet Take 20 mg by mouth daily    Historical Provider, MD   albuterol sulfate HFA (VENTOLIN HFA) 108 (90 Base) MCG/ACT inhaler Inhale 2 puffs into the lungs every 6 hours as needed for Wheezing    Historical Provider, MD   sennosides-docusate sodium (SENOKOT-S) 8.6-50 MG tablet Take 2 tablets by mouth daily    Historical Provider, MD   vitamin D (CHOLECALCIFEROL) 25 MCG (1000 UT) TABS tablet Take 1,000 Units by mouth daily    Historical Provider, MD       Current medications:    Current Facility-Administered Medications   Medication Dose Route Frequency Provider Last Rate Last Admin    albuterol sulfate  (90 Base) MCG/ACT inhaler 2 puff  2 puff Inhalation Q6H PRN Kwabena Adler MD   2 puff at 20 1016    aspirin chewable tablet 81 mg  81 mg Oral Daily Kwabena Adler MD   81 mg at 20 8212  atorvastatin (LIPITOR) tablet 10 mg  10 mg Oral Daily Tom Jimenez MD   10 mg at 12/26/20 6074    budesonide-formoterol (SYMBICORT) 160-4.5 MCG/ACT inhaler 2 puff  2 puff Inhalation BID Tom Jimenez MD   2 puff at 12/26/20 2131    ferrous sulfate (IRON 325) tablet 325 mg  325 mg Oral Daily with breakfast Tom Jimenez MD   325 mg at 12/26/20 2926    losartan (COZAAR) tablet 100 mg  100 mg Oral Daily Tom Jimenez MD   100 mg at 12/26/20 0932    pantoprazole (PROTONIX) tablet 20 mg  20 mg Oral Daily Tom Jimenez MD   20 mg at 12/26/20 0616    sennosides-docusate sodium (SENOKOT-S) 8.6-50 MG tablet 2 tablet  2 tablet Oral Daily Tom Jimenez MD   2 tablet at 12/26/20 3850    vitamin D CAPS 1,000 Units  1,000 Units Oral Daily Tom Jimenez MD   1,000 Units at 12/26/20 4978    hydrALAZINE (APRESOLINE) injection 10 mg  10 mg Intravenous Q4H PRN Tom Jimenez MD   10 mg at 12/26/20 1044    nitroGLYCERIN 50 mg in dextrose 5% 250 mL infusion  5 mcg/min Intravenous Continuous Thomas Gamboa MD 3 mL/hr at 12/26/20 1510 10 mcg/min at 12/26/20 1510    sodium chloride flush 0.9 % injection 10 mL  10 mL Intravenous 2 times per day Tom Jimenez MD   10 mL at 12/26/20 2015    sodium chloride flush 0.9 % injection 10 mL  10 mL Intravenous PRN Tom Jimenez MD        enoxaparin (LOVENOX) injection 40 mg  40 mg Subcutaneous Daily Tom Jimenez MD   40 mg at 12/26/20 0699    promethazine (PHENERGAN) tablet 12.5 mg  12.5 mg Oral Q6H PRN Tom Jimenez MD        Or    ondansetron (ZOFRAN) injection 4 mg  4 mg Intravenous Q6H PRN Tom Jimenez MD        polyethylene glycol (GLYCOLAX) packet 17 g  17 g Oral Daily PRN Tom Jimenez MD        acetaminophen (TYLENOL) tablet 650 mg  650 mg Oral Q6H PRN Tom Jimenez MD        Or  acetaminophen (TYLENOL) suppository 650 mg  650 mg Rectal Q6H PRN Fannie Villalobos MD        DOPamine (INTROPIN) 400 mg in dextrose 5 % 250 mL infusion  2.5 mcg/kg/min Intravenous Continuous Fannie Villalobos MD 8.9 mL/hr at 12/27/20 0635 2.5 mcg/kg/min at 12/27/20 5156       Allergies: Allergies   Allergen Reactions    Lisinopril     Pcn [Penicillins]     Sulfa Antibiotics        Problem List:    Patient Active Problem List   Diagnosis Code    Complete heart block (HCC) I44.2       Past Medical History:        Diagnosis Date    Arthrofibrosis of total knee arthroplasty ([facility] [address]     bilateral    Asthma     CAD (coronary artery disease)     CHF (congestive heart failure) (HCC)     Constipation     DDD (degenerative disc disease), cervical     Diabetes mellitus ([facility] [facility]     Femur fracture, right (HCC)     distal femur    GERD (gastroesophageal reflux disease)     Hypertension     Venous stasis        Past Surgical History:        Procedure Laterality Date    APPENDECTOMY      KNEE SURGERY         Social History:    Social History     Tobacco Use    Smoking status: Never Smoker    Smokeless tobacco: Never Used   Substance Use Topics    Alcohol use: Not Currently                                Counseling given: Not Answered      Vital Signs (Current):   Vitals:    12/27/20 0400 12/27/20 0500 12/27/20 0600 12/27/20 0700   BP: (!) 150/55 (!) 141/51 (!) 160/58 (!) 160/57   Pulse: (!) 35 (!) 35 (!) 33 (!) 41   Resp: 18 21 25 22   Temp:       TempSrc:       SpO2: 92% 92% 97% 93%   Weight:  201 lb 4.5 oz (91.3 kg)     Height:                                                  BP Readings from Last 3 Encounters:   12/27/20 (!) 160/57   12/25/20 (!) 144/100       NPO Status:                                                                                 BMI:   Wt Readings from Last 3 Encounters:   12/27/20 201 lb 4.5 oz (91.3 kg)   12/25/20 210 lb (95.3 kg)     Body mass index is 29.72 kg/m².     CBC: Lab Results   Component Value Date    WBC 9.8 12/26/2020    RBC 3.56 12/26/2020    HGB 10.8 12/26/2020    HCT 33.8 12/26/2020    MCV 94.9 12/26/2020    RDW 13.6 12/26/2020     12/26/2020       CMP:   Lab Results   Component Value Date     12/26/2020    K 4.0 12/26/2020     12/26/2020    CO2 26 12/26/2020    BUN 11 12/26/2020    CREATININE 0.8 12/26/2020    GFRAA >60 12/26/2020    LABGLOM >60 12/26/2020    GLUCOSE 115 12/26/2020    PROT 5.9 12/26/2020    CALCIUM 9.6 12/26/2020    BILITOT 0.5 12/26/2020    ALKPHOS 84 12/26/2020    AST 21 12/26/2020    ALT 16 12/26/2020       POC Tests:   Recent Labs     12/26/20  2330   POCGLU 128*       Coags:   Lab Results   Component Value Date    PROTIME 12.3 12/26/2020    INR 1.02 12/26/2020    APTT 40.2 12/26/2020       HCG (If Applicable): No results found for: PREGTESTUR, PREGSERUM, HCG, HCGQUANT     ABGs: No results found for: PHART, PO2ART, AEN8RBQ, BJM1OXV, BEART, I9MQENFZ     Type & Screen (If Applicable):  No results found for: LABABO, LABRH    Drug/Infectious Status (If Applicable):  No results found for: HIV, HEPCAB    COVID-19 Screening (If Applicable):   Lab Results   Component Value Date    COVID19 NOT DETECTED 12/25/2020         Anesthesia Evaluation    Airway: Mallampati: II  TM distance: >3 FB   Neck ROM: full  Mouth opening: > = 3 FB Dental:          Pulmonary:normal exam    (+) asthma:                            Cardiovascular:    (+) hypertension:, CAD:, CHF:,                   Neuro/Psych:               GI/Hepatic/Renal:   (+) GERD:,           Endo/Other:    (+) Diabetes, . Abdominal:           Vascular:                                        Anesthesia Plan      MAC     ASA 4       Induction: intravenous. MIPS: Postoperative opioids intended. Anesthetic plan and risks discussed with patient. Plan discussed with CRNA.     Attending anesthesiologist reviewed and agrees with Pre Eval content Shira Caro MD   12/27/2020

## 2020-12-27 NOTE — PROGRESS NOTES
Received from  OR/ ICU bed. Placed on bedside monitors. In  Pure pacer rhythm. V paced between   Rate  Of 80-93 . Patient lethargic but arouses. Remains  Confused and disoriented. Report received from OR nurse and ansthesia.   General assessment done

## 2020-12-28 ENCOUNTER — APPOINTMENT (OUTPATIENT)
Dept: GENERAL RADIOLOGY | Age: 85
DRG: 242 | End: 2020-12-28
Attending: INTERNAL MEDICINE
Payer: MEDICARE

## 2020-12-28 LAB
ANION GAP SERPL CALCULATED.3IONS-SCNC: 8 MMOL/L (ref 4–16)
BUN BLDV-MCNC: 14 MG/DL (ref 6–23)
CALCIUM SERPL-MCNC: 9.6 MG/DL (ref 8.3–10.6)
CHLORIDE BLD-SCNC: 98 MMOL/L (ref 99–110)
CO2: 27 MMOL/L (ref 21–32)
CREAT SERPL-MCNC: 0.8 MG/DL (ref 0.6–1.1)
GFR AFRICAN AMERICAN: >60 ML/MIN/1.73M2
GFR NON-AFRICAN AMERICAN: >60 ML/MIN/1.73M2
GLUCOSE BLD-MCNC: 108 MG/DL (ref 70–99)
HCT VFR BLD CALC: 35.6 % (ref 37–47)
HEMOGLOBIN: 10.9 GM/DL (ref 12.5–16)
LV EF: 53 %
LVEF MODALITY: NORMAL
MCH RBC QN AUTO: 29.1 PG (ref 27–31)
MCHC RBC AUTO-ENTMCNC: 30.6 % (ref 32–36)
MCV RBC AUTO: 95.2 FL (ref 78–100)
PDW BLD-RTO: 13.2 % (ref 11.7–14.9)
PLATELET # BLD: 229 K/CU MM (ref 140–440)
PMV BLD AUTO: 9.2 FL (ref 7.5–11.1)
POTASSIUM SERPL-SCNC: 3.6 MMOL/L (ref 3.5–5.1)
RBC # BLD: 3.74 M/CU MM (ref 4.2–5.4)
SARS-COV-2, NAAT: NOT DETECTED
SODIUM BLD-SCNC: 133 MMOL/L (ref 135–145)
SOURCE: NORMAL
WBC # BLD: 12.1 K/CU MM (ref 4–10.5)

## 2020-12-28 PROCEDURE — 97530 THERAPEUTIC ACTIVITIES: CPT

## 2020-12-28 PROCEDURE — 73110 X-RAY EXAM OF WRIST: CPT

## 2020-12-28 PROCEDURE — 94640 AIRWAY INHALATION TREATMENT: CPT

## 2020-12-28 PROCEDURE — U0002 COVID-19 LAB TEST NON-CDC: HCPCS

## 2020-12-28 PROCEDURE — 85027 COMPLETE CBC AUTOMATED: CPT

## 2020-12-28 PROCEDURE — 97166 OT EVAL MOD COMPLEX 45 MIN: CPT

## 2020-12-28 PROCEDURE — 2060000000 HC ICU INTERMEDIATE R&B

## 2020-12-28 PROCEDURE — 80048 BASIC METABOLIC PNL TOTAL CA: CPT

## 2020-12-28 PROCEDURE — 99232 SBSQ HOSP IP/OBS MODERATE 35: CPT | Performed by: INTERNAL MEDICINE

## 2020-12-28 PROCEDURE — 97163 PT EVAL HIGH COMPLEX 45 MIN: CPT

## 2020-12-28 PROCEDURE — 6370000000 HC RX 637 (ALT 250 FOR IP): Performed by: INTERNAL MEDICINE

## 2020-12-28 PROCEDURE — 94761 N-INVAS EAR/PLS OXIMETRY MLT: CPT

## 2020-12-28 PROCEDURE — 6360000002 HC RX W HCPCS: Performed by: INTERNAL MEDICINE

## 2020-12-28 PROCEDURE — 2580000003 HC RX 258: Performed by: SURGERY

## 2020-12-28 PROCEDURE — 97116 GAIT TRAINING THERAPY: CPT

## 2020-12-28 PROCEDURE — 2700000000 HC OXYGEN THERAPY PER DAY

## 2020-12-28 PROCEDURE — 93306 TTE W/DOPPLER COMPLETE: CPT

## 2020-12-28 RX ORDER — ACETAMINOPHEN 500 MG
1000 TABLET ORAL EVERY 8 HOURS SCHEDULED
Status: DISCONTINUED | OUTPATIENT
Start: 2020-12-28 | End: 2020-12-29 | Stop reason: HOSPADM

## 2020-12-28 RX ORDER — OXYCODONE HYDROCHLORIDE 5 MG/1
5 TABLET ORAL EVERY 4 HOURS PRN
Status: DISCONTINUED | OUTPATIENT
Start: 2020-12-28 | End: 2020-12-29 | Stop reason: HOSPADM

## 2020-12-28 RX ADMIN — ASPIRIN 81 MG CHEWABLE TABLET 81 MG: 81 TABLET CHEWABLE at 10:23

## 2020-12-28 RX ADMIN — ENOXAPARIN SODIUM 40 MG: 40 INJECTION SUBCUTANEOUS at 10:24

## 2020-12-28 RX ADMIN — ATORVASTATIN CALCIUM 10 MG: 10 TABLET, FILM COATED ORAL at 10:23

## 2020-12-28 RX ADMIN — SODIUM CHLORIDE, PRESERVATIVE FREE 10 ML: 5 INJECTION INTRAVENOUS at 20:38

## 2020-12-28 RX ADMIN — DOCUSATE SODIUM AND SENNOSIDES 2 TABLET: 8.6; 5 TABLET, FILM COATED ORAL at 10:22

## 2020-12-28 RX ADMIN — LOSARTAN POTASSIUM 100 MG: 100 TABLET, FILM COATED ORAL at 10:23

## 2020-12-28 RX ADMIN — ACETAMINOPHEN 650 MG: 325 TABLET ORAL at 13:12

## 2020-12-28 RX ADMIN — OXYCODONE HYDROCHLORIDE 5 MG: 5 TABLET ORAL at 16:06

## 2020-12-28 RX ADMIN — BUDESONIDE AND FORMOTEROL FUMARATE DIHYDRATE 2 PUFF: 160; 4.5 AEROSOL RESPIRATORY (INHALATION) at 20:12

## 2020-12-28 RX ADMIN — ACETAMINOPHEN 650 MG: 325 TABLET ORAL at 06:54

## 2020-12-28 RX ADMIN — Medication 1000 UNITS: at 10:23

## 2020-12-28 RX ADMIN — FERROUS SULFATE TAB 325 MG (65 MG ELEMENTAL FE) 325 MG: 325 (65 FE) TAB at 06:50

## 2020-12-28 RX ADMIN — SODIUM CHLORIDE, PRESERVATIVE FREE 10 ML: 5 INJECTION INTRAVENOUS at 10:23

## 2020-12-28 RX ADMIN — BUDESONIDE AND FORMOTEROL FUMARATE DIHYDRATE 2 PUFF: 160; 4.5 AEROSOL RESPIRATORY (INHALATION) at 08:56

## 2020-12-28 RX ADMIN — PANTOPRAZOLE SODIUM 20 MG: 20 TABLET, DELAYED RELEASE ORAL at 07:36

## 2020-12-28 RX ADMIN — HYDROCHLOROTHIAZIDE 12.5 MG: 12.5 TABLET ORAL at 10:22

## 2020-12-28 ASSESSMENT — PAIN SCALES - GENERAL
PAINLEVEL_OUTOF10: 0
PAINLEVEL_OUTOF10: 5
PAINLEVEL_OUTOF10: 4
PAINLEVEL_OUTOF10: 4
PAINLEVEL_OUTOF10: 3
PAINLEVEL_OUTOF10: 0
PAINLEVEL_OUTOF10: 0

## 2020-12-28 ASSESSMENT — PAIN DESCRIPTION - PAIN TYPE
TYPE: ACUTE PAIN

## 2020-12-28 ASSESSMENT — PAIN DESCRIPTION - ONSET
ONSET: ON-GOING
ONSET: UNABLE TO TELL

## 2020-12-28 ASSESSMENT — PAIN DESCRIPTION - PROGRESSION
CLINICAL_PROGRESSION: NOT CHANGED
CLINICAL_PROGRESSION: GRADUALLY WORSENING
CLINICAL_PROGRESSION: NOT CHANGED
CLINICAL_PROGRESSION: NOT CHANGED

## 2020-12-28 ASSESSMENT — PAIN DESCRIPTION - ORIENTATION: ORIENTATION: LEFT

## 2020-12-28 ASSESSMENT — PAIN DESCRIPTION - FREQUENCY: FREQUENCY: CONTINUOUS

## 2020-12-28 ASSESSMENT — PAIN DESCRIPTION - DESCRIPTORS: DESCRIPTORS: ACHING

## 2020-12-28 ASSESSMENT — PAIN - FUNCTIONAL ASSESSMENT: PAIN_FUNCTIONAL_ASSESSMENT: ACTIVITIES ARE NOT PREVENTED

## 2020-12-28 NOTE — PROGRESS NOTES
Physical Therapy    Facility/Department: [facility] ICU  Initial Assessment    NAME: Kaylyn Reagan  : 1935  MRN: [id number]    Date of Service: 2020    Discharge Recommendations:  Subacute/Skilled Nursing Facility        Assessment   Assessment: Pt is an 81 y.o. female with medical history, surgical history, co-morbidities, and personal factors including Arthrofibrosis of total knee arthroplasty, Asthma, CAD (coronary artery disease), CHF (congestive heart failure), Constipation, DDD (degenerative disc disease), cervical, Diabetes mellitus, Femur fracture, right, GERD (gastroesophageal reflux disease), Hypertension, and Venous stasis with admission for complete heart block and s/p permanent dual chamber pacemaker. Prior to admission, pt was independent with functional mobility and ADLs. Examination of body systems reveals decreased strength, decreased balance, decreased aerobic capacity, and decreased independence with functional mobility. Prognosis: Good  Decision Making: High Complexity  Clinical Presentation: unpredictable characteristics  PT Education: Goals;PT Role;Plan of Care; Functional Mobility Training;Equipment;Transfer Training;General Safety;Gait Training  REQUIRES PT FOLLOW UP: Yes  Activity Tolerance  Activity Tolerance: Patient Tolerated treatment well         Restrictions  Restrictions/Precautions  Restrictions/Precautions: General Precautions, Fall Risk  Implants present? : Pacemaker(pacemaker precautions)  Position Activity Restriction  Other position/activity restrictions: sling on LUE  Vision/Hearing        Subjective  General  Chart Reviewed: Yes  Patient assessed for rehabilitation services?: Yes  Family / Caregiver Present: No  Follows Commands: Within Functional Limits  Pain Screening  Patient Currently in Pain: Yes  Pain Assessment  Pain Assessment: 0-10  Pain Level: 5  Pain Type: Acute pain  Pain Location: Wrist  Vital Signs  Patient Currently in Pain: Yes Orientation  Orientation  Overall Orientation Status: Impaired  Orientation Level: Disoriented to time;Disoriented to place;Oriented to person  Social/Functional History  Social/Functional History  Lives With: Family(sister)  Type of Home: House  Home Layout: One level  ADL Assistance: Independent  Homemaking Assistance: Independent  Ambulation Assistance: Independent  Transfer Assistance: Independent  Active : Yes  Cognition   Cognition  Overall Cognitive Status: Exceptions  Arousal/Alertness: Appropriate responses to stimuli  Following Commands:  Follows all commands without difficulty  Attention Span: Appears intact  Safety Judgement: Decreased awareness of need for safety;Decreased awareness of need for assistance  Problem Solving: Decreased awareness of errors  Insights: Decreased awareness of deficits  Initiation: Requires cues for some  Sequencing: Requires cues for some    Objective             Strength RLE  Comment: knee extension: 4/5, ankle DF: 4/5  Strength LLE  Comment: knee extension: 4/5, ankle DF: 4/5     Sensation  Overall Sensation Status: WNL  Bed mobility  Supine to Sit: Moderate assistance;2 Person assistance(with verbal and tactile cues for BUE and BLE placement throughout transfer; with HOB elevated; with increased time for task completion)  Transfers  Sit to Stand: Minimal Assistance;2 Person Assistance  Stand to sit: Minimal Assistance;2 Person Assistance(with verbal cues to feel chair against back of legs, reach back, and sit slowly)  Ambulation  Ambulation?: Yes  Ambulation 1  Surface: level tile  Device: No Device  Assistance: 2 Person assistance;Minimal assistance  Quality of Gait: decreased gait speed, decreased step length bilaterally, unsteady gait  Distance: 6 feet  Comments: with verbal and tactile cues for BLE placement throughout; with verbal and tactile cues to maintain upright posture in order to avoid COM shifting outside of LUPE     Balance  Posture: Poor(forward head, rounded shoulders, increased thoracic kyphosis)  Sitting - Static: Good  Sitting - Dynamic: Fair  Standing - Static: Poor;+  Standing - Dynamic: Poor;+      Gait Training:  Cues were given for safety, sequence, device management, balance, posture, awareness, path. Therapeutic Activity Training:   Therapeutic activity training was instructed today. Cues were given for safety, sequence, UE/LE placement, awareness, and balance. Activities performed today included bed mobility training, sup-sit, sit-stand. Plan   Plan  Times per week: 3+  Current Treatment Recommendations: Strengthening, ROM, Balance Training, Functional Mobility Training, Transfer Training, ADL/Self-care Training, Stair training, IADL Training, Neuromuscular Re-education, Safety Education & Training, Endurance Training, Patient/Caregiver Education & Training, Equipment Evaluation, Education, & procurement, Gait Training, Positioning, Pain Management, Wheelchair Mobility Training, Home Exercise Program, Cognitive/Perceptual Training, Cognitive Reorientation  Safety Devices  Type of devices: All fall risk precautions in place, Left in chair, Call light within reach, Chair alarm in place, Nurse notified, Gait belt, Patient at risk for falls    AM-PAC Score  AM-PAC Inpatient Mobility Raw Score : 11 (12/28/20 1313)  AM-PAC Inpatient T-Scale Score : 33.86 (12/28/20 1313)  Mobility Inpatient CMS 0-100% Score: 72.57 (12/28/20 1313)  Mobility Inpatient CMS G-Code Modifier : CL (12/28/20 1313)          Goals  Long term goals  Time Frame for Long term goals :  In one week:  Long term goal 1: Pt will complete all bed mobility with CGAx1  Long term goal 2: Pt will complete sit <> stand transfers with CGAx1  Long term goal 3: Pt will ambulate 50 feet with CGAx1 with LRAD  Long term goal 4: Pt will independently complete 3 sets of 10 reps of BLE AROM exercises in available and allowed ROM       Time In: 1029  Time Out: 1102  Total Treatment Time: 33  Timed Code Treatment Minutes: Jeremias Morton, DPARGENIS  License #: [id number]

## 2020-12-28 NOTE — PROGRESS NOTES
CARDIOLOGY PROGRESS NOTE                                                  Name:  Yolis Redmond /Age/Sex: 1935  (81 y.o. female)   MRN & CSN:  [id number] & [id number] Admission Date/Time: 2020  9:37 PM   Location:  -A PCP: No primary care provider on file. Admit Date:  2020  Hospital Day: 4      SUBJECTIVE:   Seen patient as follow up as consultation for [facility]    No chest pain. No shortness of breath  No palpations    TELEMETRY: VPaced       Assessment/Plan:         1. Complete heart block s/p PPM:  Outpt pacer clinic follow up , Echo pending, will follow  2. Essential hypertension: Better controlled. Cont Losartan, will add HCTZ   3. History of COPD: Stable. As needed bronchodilators. 4. Hyperlipidemia: Continue with Lipitor 10 mg daily  5. Obesity: BMI of 30.67. Lifestyle modifications were discussed with the patient. Outpt follow up   Cardiology will sign off, please call us with questions     Past medical history:    has a past medical history of Arthrofibrosis of total knee arthroplasty ([address] Asthma, CAD (coronary artery disease), CHF (congestive heart failure) ([address] Constipation, DDD (degenerative disc disease), cervical, Diabetes mellitus ([address] Femur fracture, right ([address] GERD (gastroesophageal reflux disease), Hypertension, and Venous stasis. Past surgical history:   has a past surgical history that includes knee surgery; Appendectomy; Pacemaker insertion; and pacemaker placement (2020). Social History:   reports that she has never smoked. She has never used smokeless tobacco. She reports previous alcohol use. She reports that she does not use drugs. Family history:  family history is not on file.     OBJECTIVE:     /65   Pulse 75   Temp 98.4 °F (36.9 °C) (Axillary)   Resp 25   Ht 5' 9\" (1.753 m)   Wt 203 lb 4.2 oz (92.2 kg)   SpO2 93%   BMI 30.02 kg/m²       Intake/Output Summary (Last 24 hours) at 2020 1256  Last data filed at 12/28/2020 1022  Gross per 24 hour   Intake 500 ml   Output 550 ml   Net -50 ml       Physical Exam:    General Appearance:  No distress, conversant  Constitutional:  Well developed, Well nourished  HEENT:  Normocephalic, Atraumatic, Oropharynx moist, No oral exudates,   Nose normal. Neck Supple Carotid: no carotid bruit  Eyes:  Conjunctiva normal, No discharge. Respiratory:    Normal breath sounds, No respiratory distress, No wheezing, no use of accessory muscles, diaphragm movement is normal  No chest Tenderness  Cardiovascular: S1-S2   No murmurs auscultated. No rubs, thrills or gallops. Normal  rhythm. Pedal pulses are normal. No pedal edema  GI:  Soft Non tender, non distended. :  No CVA tenderness. Musculoskeletal:   No tenderness, No cyanosis, No clubbing. Integument:  Warm, Dry, No erythema, No rash. Lymphatic:  No lymphadenopathy noted. Neurologic:  Alert & oriented x 3  No focal deficits noted.    Psychiatric:  Affect normal, Judgment normal, Mood normal.       MEDICATIONS:     sodium chloride flush  10 mL Intravenous 2 times per day    hydroCHLOROthiazide  12.5 mg Oral Daily    aspirin  81 mg Oral Daily    atorvastatin  10 mg Oral Daily    budesonide-formoterol  2 puff Inhalation BID    ferrous sulfate  325 mg Oral Daily with breakfast    losartan  100 mg Oral Daily    pantoprazole  20 mg Oral Daily    sennosides-docusate sodium  2 tablet Oral Daily    vitamin D  1,000 Units Oral Daily    enoxaparin  40 mg Subcutaneous Daily       sodium chloride flush, albuterol sulfate HFA, hydrALAZINE, promethazine **OR** ondansetron, polyethylene glycol, acetaminophen **OR** acetaminophen  Allergies   Allergen Reactions    Lisinopril     Pcn [Penicillins]     Sulfa Antibiotics        Lab Data:  CBC:   Recent Labs     12/25/20  1800 12/26/20  0405 12/28/20  0244   WBC 8.7 9.8 12.1*   HGB 11.2* 10.8* 10.9*   HCT 35.6* 33.8* 35.6*   MCV 97.0 94.9 95.2    256 229     BMP:

## 2020-12-28 NOTE — PROGRESS NOTES
Occupational Therapy    [facility] ACUTE CARE OCCUPATIONAL THERAPY EVALUATION    Abigail Armando, 1935, 2130/2130-A, 12/28/2020    Discharge Recommendation: Victor M Rosas      History:  [place]:  There were no encounter diagnoses. Subjective:  Patient states: \"This bed feels pretty nice but I'm willing to try to get up! \"  Pain: Pt reported 5/10 pain in Lt wrist  Communication with other providers: PT Kathie Ramos RN Charu Crawley   Restrictions: General Precautions, Fall Risk, Pacemaker Precautions, Telemetry, Pulse Ox, BP cuff, SCDs, Bed/chair alarm    Home Setup/Prior level of function:  Social/Functional History  Lives With: Family (sister)  Type of Home: House  Home Layout: One level  ADL Assistance: Independent  Homemaking Assistance: Independent  Ambulation Assistance: Independent (reports using no AD)  Transfer Assistance: Independent  Active : Yes  Additional Comments: Pt not a reliable historian and all above information should be verified. Pt stated she is from [facility] and visiting [place] in [place]. Per charting, pt is from [facility]. Examination:  · Observation: Supine in bed upon arrival. Pleasantly confused and agreeable to evaluation.    · [facility]  · Hearing: [place]  · Vitals: Stable vitals throughout session    Body Systems and functions:  · ROM: Rt UE WFL all joints, Lt UE not assessed proximally due to sling and pacemaker precautions, Lt wrist with minimal active ROM (reported wrist pain), Lt fingers WFL  · Strength: Rt UE WFL all major muscle groups, Lt UE not assessed due to pacemaker precautions  · Sensation: WFL (denies numbness/tingling)  · Tone: Normal  · Coordination: Limited functional use of Lt hand due to wrist OA, Rt UE grossly WFL    Activities of Daily Living (ADLs):  · Feeding: Supervision/setup (anticipate setup for opening packages/containers and cutting up food due to limited functional Lt UE use)  · Grooming: CGA/setup (seated facial hygiene EOB; unable to complete in standing at this time)  · UB bathing: Min A (washing Rt arm due to Lt UE sling)  · LB bathing: Max A  · UB dressing: Dependent (donning clean hospital gown and adjustment of Lt UE sling)  · LB dressing: Dependent (donning BL socks)  · Toileting: Dependent (anticipate total assist required for clothing mgmt both directions and kory care)    Cognitive and Psychosocial Functioning:  · Overall cognitive status: Impaired (pt disoriented to time/place/situation, significant memory impairments and difficulty with comprehension, limited overall insight/safety awareness)  · Affect: Normal     Balance:   · Sitting: CGA unsupported sitting EOB during Lt UE sling adjustment   · Standing: CGA/min A with hand-held assist Rt UE    Functional Mobility:  · Bed Mobility: Mod A supine to sitting EOB (HOB elevated, significantly increased time/effort required, mod coaching for technique/pacemaker precautions)  · Transfers: Min A sit to stand from bed, Mod A stand to sit to chair (mod cues for pacemaker precautions)  · Ambulation: Min A to Mod A with Rt UE hand-held assist 5 ft bed to chair; unsteady, unpredictable gait, limited tolerance this date      AM-PAC 6 click short form for inpatient daily activity:   How much help from another person does the patient currently need. .. Unable  Dep A Lot  Max A A Lot   Mod A A Little  Min A A Little   CGA  SBA None   Mod I  Indep  Sup   1. Putting on and taking off regular lower body clothing? [x] 1    [] 2   [] 2   [] 3   [] 3   [] 4      2. Bathing (including washing, rinsing, drying)? [] 1   [x] 2   [] 2 [] 3 [] 3 [] 4   3. Toileting, which includes using toilet, bedpan, or urinal? [x] 1    [] 2   [] 2   [] 3   [] 3   [] 4     4. Putting on and taking off regular upper body clothing? [x] 1   [] 2   [] 2   [] 3   [] 3    [] 4      5. Taking care of personal grooming such as brushing teeth? [] 1   [] 2    [] 2 [] 3    [x] 3   [] 4      6. Eating meals?    [] 1   [] 2   [] 2   [] 3   [x] 3   [] 4      Raw Score:  11     [24=0% impaired(CH), 23=1-19%(CI), 20-22=20-39%(CJ), 15-19=40-59%(CK), 10-14=60-79%(CL), 7-9=80-99%(CM), 6=100%(CN)]     Treatment:  Therapeutic Activity Training:   Therapeutic activity training was instructed today. Cues were given for safety, sequence, UE/LE placement, awareness, and balance. Activities performed today included bed mobility training, sitting balance/tolerance, UB/LB dressing tasks, transfer training, HH mobility, education on role of OT, POC, pacemaker precautions/wear of sling, importance of OOB activity, d/c planning      Safety Measures: Gait belt used, Left in Chair, Alarm in place    Assessment:  Pt is an 81year old female with a past medical history of Arthrofibrosis of total knee arthroplasty ([address] Asthma, CAD (coronary artery disease), CHF (congestive heart failure) ([address] Constipation, DDD (degenerative disc disease), cervical, Diabetes mellitus ([address] Femur fracture, right ([address] GERD (gastroesophageal reflux disease), Hypertension, and Venous stasis. Pt admitted with bradycardia and diagnosed with third-degree heart block. Pt underwent permanent pacemaker placement on 12-27. Pt lives at [facility], and reports she is independent with ADLs and ambulates without an AD at baseline. Pt currently presents with the above impairments. Recommend continued OT services in SNF at discharge. Complexity: Moderate  Prognosis: Good  Plan: 4x/week      Goals:  1. Pt will complete all aspects of bed mobility for EOB/OOB ADLs CGA/good adherence to pacemaker precautions  2. Pt will complete UB/LB bathing min A with setup using long handled sponge PRN  3. Pt will complete all aspects of LB dressing mod A with setup using AE PRN  4. Pt will complete all functional transfers to and from bed, chair, toilet, shower chair CGA/good adherence to pacemaker precautions  5.  Pt will ambulate HH distance to bathroom for toileting CGA using LRAD  6. Pt will complete all aspects of toileting task mod A on regular toilet  7. Pt will complete oral hygiene/grooming routine in standing at sink CGA with setup  8.  Pt will verbalize/be compliant with pacemaker precautions 100% of the time      Time:   Time in: 1033  Time out: 1057  Timed treatment minutes: 9  Total time: 24      Electronically signed by:    SHARMILA Dugan/ESAU, [address] [facility], .[id number]

## 2020-12-28 NOTE — PROGRESS NOTES
PATIENT NAME: Kaylyn Reagan    TODAY'S DATE: 12/28/20    SUBJECTIVE:    Pt is POD # 1 s/p PPM placment. Pt feeling well, minimal pain and swelling. OBJECTIVE:   VITALS:    Vitals:    12/28/20 0927   BP:    Pulse: 74   Resp: 24   Temp:    SpO2: (!) 87%     INTAKE/OUTPUT:    Date 12/28/20 0000 - 12/28/20 2359   Shift [id number] 3147-2308 8555-5067 24 Hour Total   INTAKE   P.O.  250  250   I. V.(mL/kg/hr)  10  10   Shift Total(mL/kg)  260(2.8)  260(2.8)   OUTPUT   Urine(mL/kg/hr) 550(0.7)   550   Shift Total(mL/kg) 550(6)   550(6)   Weight (kg) 92.2 92.2 92.2 92.2      Patient Vitals for the past 96 hrs (Last 3 readings):   Weight   12/28/20 0341 203 lb 4.2 oz (92.2 kg)   12/27/20 0500 201 lb 4.5 oz (91.3 kg)   12/26/20 0600 207 lb 10.8 oz (94.2 kg)       EXAM:  Blood pressure 137/75, pulse 74, temperature 98.4 °F (36.9 °C), temperature source Axillary, resp. rate 24, height 5' 9\" (1.753 m), weight 203 lb 4.2 oz (92.2 kg), SpO2 (!) 87 %. General appearance: No apparent distress, appears stated age and cooperative. Skin: unremarkable  HEENT Normocephalic, atraumatic without obvious deformity. Neck: Supple, Trachea midline   Lungs: Good respiratory effort.  Clear to auscultation, bilaterally  Heart: Regular rate/ rhythm inc c/d/i no hematoma noted  Abdomen: Soft, non-tender or non-distended   Extremities: min edema warm well perfused  Neurologic: Alert, grossly intact  Mental status: normal affect      Data:  CBC:   Recent Labs     12/25/20  1800 12/26/20  0405 12/28/20  0244   WBC 8.7 9.8 12.1*   HGB 11.2* 10.8* 10.9*   HCT 35.6* 33.8* 35.6*    256 229     BMP:    Recent Labs     12/26/20  0405 12/27/20  0740 12/28/20  0244    132* 133*   K 4.0 4.1 3.6    99 98*   CO2 26 26 27   BUN 11 13 14   CREATININE 0.8 0.8 0.8   GLUCOSE 115* 141* 108*     Hepatic:   Recent Labs     12/25/20  1800 12/26/20  0405   AST 28 21   ALT 19 16   BILITOT 0.4 0.5   ALKPHOS 85 84     Mag:      Recent Labs 12/27/20  0740   MG 2.0      Phos:   No results for input(s): PHOS in the last 72 hours. INR:   Recent Labs     12/25/20  1820 12/26/20  0405   INR 1.09 1.02       Radiology Review:  CXR leads in good position, no PTX      ASSESSMENT AND PLAN:    Patient Active Problem List   Diagnosis    Complete heart block (HCC)       S/P PPM placement     Pacer check okay. Incision C/D/I without hematoma. Stable for DC from CT surgery standpoint. F/u 1 mo for wound check.      Stefani Lacey PA-C

## 2020-12-29 VITALS
HEIGHT: 69 IN | HEART RATE: 70 BPM | OXYGEN SATURATION: 98 % | WEIGHT: 203.26 LBS | BODY MASS INDEX: 30.11 KG/M2 | DIASTOLIC BLOOD PRESSURE: 79 MMHG | SYSTOLIC BLOOD PRESSURE: 158 MMHG | RESPIRATION RATE: 21 BRPM | TEMPERATURE: 98.3 F

## 2020-12-29 LAB
ANION GAP SERPL CALCULATED.3IONS-SCNC: 7 MMOL/L (ref 4–16)
BUN BLDV-MCNC: 20 MG/DL (ref 6–23)
CALCIUM SERPL-MCNC: 9.7 MG/DL (ref 8.3–10.6)
CHLORIDE BLD-SCNC: 96 MMOL/L (ref 99–110)
CO2: 30 MMOL/L (ref 21–32)
CREAT SERPL-MCNC: 0.9 MG/DL (ref 0.6–1.1)
GFR AFRICAN AMERICAN: >60 ML/MIN/1.73M2
GFR NON-AFRICAN AMERICAN: 60 ML/MIN/1.73M2
GLUCOSE BLD-MCNC: 114 MG/DL (ref 70–99)
HCT VFR BLD CALC: 36.1 % (ref 37–47)
HEMOGLOBIN: 11.2 GM/DL (ref 12.5–16)
MCH RBC QN AUTO: 30.2 PG (ref 27–31)
MCHC RBC AUTO-ENTMCNC: 31 % (ref 32–36)
MCV RBC AUTO: 97.3 FL (ref 78–100)
PDW BLD-RTO: 13.1 % (ref 11.7–14.9)
PLATELET # BLD: 226 K/CU MM (ref 140–440)
PMV BLD AUTO: 9.5 FL (ref 7.5–11.1)
POTASSIUM SERPL-SCNC: 3.8 MMOL/L (ref 3.5–5.1)
RBC # BLD: 3.71 M/CU MM (ref 4.2–5.4)
SODIUM BLD-SCNC: 133 MMOL/L (ref 135–145)
WBC # BLD: 9.6 K/CU MM (ref 4–10.5)

## 2020-12-29 PROCEDURE — 85027 COMPLETE CBC AUTOMATED: CPT

## 2020-12-29 PROCEDURE — 6370000000 HC RX 637 (ALT 250 FOR IP): Performed by: INTERNAL MEDICINE

## 2020-12-29 PROCEDURE — 97530 THERAPEUTIC ACTIVITIES: CPT

## 2020-12-29 PROCEDURE — 2580000003 HC RX 258: Performed by: SURGERY

## 2020-12-29 PROCEDURE — 6360000002 HC RX W HCPCS: Performed by: INTERNAL MEDICINE

## 2020-12-29 PROCEDURE — 94761 N-INVAS EAR/PLS OXIMETRY MLT: CPT

## 2020-12-29 PROCEDURE — 97116 GAIT TRAINING THERAPY: CPT

## 2020-12-29 PROCEDURE — 80048 BASIC METABOLIC PNL TOTAL CA: CPT

## 2020-12-29 RX ORDER — FERROUS SULFATE 325(65) MG
325 TABLET ORAL EVERY OTHER DAY
Qty: 30 TABLET | Refills: 0
Start: 2020-12-29

## 2020-12-29 RX ORDER — HYDROCHLOROTHIAZIDE 12.5 MG/1
12.5 TABLET ORAL DAILY
Qty: 30 TABLET | Refills: 0 | Status: ON HOLD
Start: 2020-12-30 | End: 2021-03-15 | Stop reason: ALTCHOICE

## 2020-12-29 RX ORDER — ACETAMINOPHEN 500 MG
1000 TABLET ORAL EVERY 8 HOURS SCHEDULED
Qty: 1 TABLET | Refills: 0 | Status: ON HOLD
Start: 2020-12-29 | End: 2022-04-22

## 2020-12-29 RX ORDER — OXYCODONE HYDROCHLORIDE 5 MG/1
5 TABLET ORAL EVERY 8 HOURS PRN
Qty: 5 TABLET | Refills: 0 | Status: SHIPPED | OUTPATIENT
Start: 2020-12-29 | End: 2021-01-01

## 2020-12-29 RX ORDER — SENNA AND DOCUSATE SODIUM 50; 8.6 MG/1; MG/1
2 TABLET, FILM COATED ORAL DAILY PRN
Qty: 1 TABLET | Refills: 0
Start: 2020-12-29

## 2020-12-29 RX ADMIN — ASPIRIN 81 MG CHEWABLE TABLET 81 MG: 81 TABLET CHEWABLE at 10:04

## 2020-12-29 RX ADMIN — SODIUM CHLORIDE, PRESERVATIVE FREE 10 ML: 5 INJECTION INTRAVENOUS at 10:04

## 2020-12-29 RX ADMIN — DOCUSATE SODIUM AND SENNOSIDES 2 TABLET: 8.6; 5 TABLET, FILM COATED ORAL at 10:04

## 2020-12-29 RX ADMIN — ATORVASTATIN CALCIUM 10 MG: 10 TABLET, FILM COATED ORAL at 10:04

## 2020-12-29 RX ADMIN — Medication 1000 UNITS: at 10:04

## 2020-12-29 RX ADMIN — HYDROCHLOROTHIAZIDE 12.5 MG: 12.5 TABLET ORAL at 10:04

## 2020-12-29 RX ADMIN — FERROUS SULFATE TAB 325 MG (65 MG ELEMENTAL FE) 325 MG: 325 (65 FE) TAB at 10:06

## 2020-12-29 RX ADMIN — LOSARTAN POTASSIUM 100 MG: 100 TABLET, FILM COATED ORAL at 10:04

## 2020-12-29 RX ADMIN — PANTOPRAZOLE SODIUM 20 MG: 20 TABLET, DELAYED RELEASE ORAL at 05:30

## 2020-12-29 RX ADMIN — ACETAMINOPHEN 1000 MG: 500 TABLET ORAL at 05:30

## 2020-12-29 RX ADMIN — ENOXAPARIN SODIUM 40 MG: 40 INJECTION SUBCUTANEOUS at 10:03

## 2020-12-29 ASSESSMENT — PAIN SCALES - GENERAL: PAINLEVEL_OUTOF10: 0

## 2020-12-29 NOTE — PROGRESS NOTES
Comprehensive Nutrition Assessment    Type and Reason for Visit:  Positive Nutrition Screen    Nutrition Recommendations/Plan:   Continue current diet as ordered  ONS available PRN  Encourage po intake as able  Will monitor po intake, nutrition status, poc    Nutrition Assessment:  pt admitted for complete heart block, PMH: CAD, CHF, DM, HTN, Pt currently on cardiac diet consuming % recent meals per documentation, positive nutrition screen for weight loss, unable to verify weight loss as there is no weight history available per chart, plan for SNF at d/c, pt is at moderate nutrition risk    Malnutrition Assessment:  Malnutrition Status: At risk for malnutrition (Comment)    Context:  Acute Illness       Estimated Daily Nutrient Needs:  Energy (kcal):  0052-0155([address] Arleen Maizes w/ stress factor 1.0-1.2); Weight Used for Energy Requirements:  Current     Protein (g):  66-79(1.0-1.2 g/kg); Weight Used for Protein Requirements:  Ideal        Fluid (ml/day):  1700; Method Used for Fluid Requirements:  1 ml/kcal      Nutrition Related Findings:  Na 133      Wounds:  Surgical Incision       Current Nutrition Therapies:    DIET CARDIAC; Anthropometric Measures:  · Height: 5' 9.02\" (175.3 cm)  · Current Body Weight: 203 lb 4.2 oz (92.2 kg)    · Ideal Body Weight: 145 lbs; % Ideal Body Weight 140.2 %   · BMI: 30  · BMI Categories: Obese Class 1 (BMI 30.0-34. 9)       Nutrition Diagnosis:   No nutrition diagnosis at this time  Nutrition Interventions:   Food and/or Nutrient Delivery:  Continue Current Diet, Start Oral Nutrition Supplement  Nutrition Education/Counseling:  No recommendation at this time   Coordination of Nutrition Care:  Continue to monitor while inpatient    Goals:  pt will consume greater than 75% of meals and supplements       Nutrition Monitoring and Evaluation:   Food/Nutrient Intake Outcomes:  Food and Nutrient Intake, Supplement Intake  Physical Signs/Symptoms Outcomes:  Biochemical Data, Weight, GI Status, Hemodynamic Status     Discharge Planning:     Too soon to determine     Electronically signed by Christen Scott MS, RD, LD on 12/29/20 at 1:41 PM EST    Contact: [phone]

## 2020-12-29 NOTE — PLAN OF CARE
Problem: Falls - Risk of:  Goal: Will remain free from falls  Description: Will remain free from falls  Outcome: Ongoing  Goal: Absence of physical injury  Description: Absence of physical injury  Outcome: Ongoing     Problem: Skin Integrity:  Goal: Will show no infection signs and symptoms  Description: Will show no infection signs and symptoms  Outcome: Ongoing  Goal: Absence of new skin breakdown  Description: Absence of new skin breakdown  Outcome: Ongoing     Problem: SAFETY  Goal: Free from accidental physical injury  Outcome: Ongoing     Problem: DAILY CARE  Goal: Daily care needs are met  Outcome: Ongoing     Problem: PAIN  Goal: Patient's pain/discomfort is manageable  Outcome: Ongoing     Problem: KNOWLEDGE DEFICIT  Goal: Patient/S.O. demonstrates understanding of disease process, treatment plan, medications, and discharge instructions.   Outcome: Ongoing     Problem: DISCHARGE BARRIERS  Goal: Patient's continuum of care needs are met  Outcome: Ongoing     Problem: Confusion - Acute:  Goal: Absence of continued neurological deterioration signs and symptoms  Description: Absence of continued neurological deterioration signs and symptoms  Outcome: Ongoing  Goal: Mental status will be restored to baseline  Description: Mental status will be restored to baseline  Outcome: Ongoing     Problem: Discharge Planning:  Goal: Ability to perform activities of daily living will improve  Description: Ability to perform activities of daily living will improve  Outcome: Ongoing  Goal: Participates in care planning  Description: Participates in care planning  Outcome: Ongoing     Problem: Injury - Risk of, Physical Injury:  Goal: Will remain free from falls  Description: Will remain free from falls  Outcome: Ongoing  Goal: Absence of physical injury  Description: Absence of physical injury  Outcome: Ongoing     Problem: Mood - Altered:  Goal: Mood stable  Description: Mood stable  Outcome: Ongoing  Goal: Absence of abusive behavior  Description: Absence of abusive behavior  Outcome: Ongoing  Goal: Verbalizations of feeling emotionally comfortable while being cared for will increase  Description: Verbalizations of feeling emotionally comfortable while being cared for will increase  Outcome: Ongoing     Problem: Psychomotor Activity - Altered:  Goal: Absence of psychomotor disturbance signs and symptoms  Description: Absence of psychomotor disturbance signs and symptoms  Outcome: Ongoing     Problem: Sensory Perception - Impaired:  Goal: Demonstrations of improved sensory functioning will increase  Description: Demonstrations of improved sensory functioning will increase  Outcome: Ongoing  Goal: Decrease in sensory misperception frequency  Description: Decrease in sensory misperception frequency  Outcome: Ongoing  Goal: Able to refrain from responding to false sensory perceptions  Description: Able to refrain from responding to false sensory perceptions  Outcome: Ongoing  Goal: Demonstrates accurate environmental perceptions  Description: Demonstrates accurate environmental perceptions  Outcome: Ongoing  Goal: Able to distinguish between reality-based and nonreality-based thinking  Description: Able to distinguish between reality-based and nonreality-based thinking  Outcome: Ongoing  Goal: Able to interrupt nonreality-based thinking  Description: Able to interrupt nonreality-based thinking  Outcome: Ongoing     Problem: Sleep Pattern Disturbance:  Goal: Appears well-rested  Description: Appears well-rested  Outcome: Ongoing     Problem: Restraint Use - Nonviolent/Non-Self-Destructive Behavior:  Goal: Absence of restraint indications  Description: Absence of restraint indications  Outcome: Ongoing  Goal: Absence of restraint-related injury  Description: Absence of restraint-related injury  Outcome: Ongoing     Problem: Pain:  Description: Pain management should include both nonpharmacologic and pharmacologic interventions.   Goal: Pain level will decrease  Description: Pain level will decrease  Outcome: Ongoing  Goal: Control of acute pain  Description: Control of acute pain  Outcome: Ongoing  Goal: Control of chronic pain  Description: Control of chronic pain  Outcome: Ongoing

## 2020-12-29 NOTE — DISCHARGE INSTR - COC
Continuity of Care Form    Patient Name: Daniel Fink   :  1935  MRN:  [id number]    Admit date:  2020  Discharge date:  2020    Code Status Order: Full Code   Advance Directives:   Advance Care Flowsheet Documentation       Date/Time Healthcare Directive Type of Healthcare Directive Copy in [address] Agent's Name Healthcare Agent's Phone Number    20 1532  No, patient does not have an advance directive for healthcare treatment  --  --  Adult Children  [facility]  in chart            Admitting Physician:  Arik Huynh MD  PCP: No primary care provider on file. Discharging Nurse: Codey Aceves [facility] Unit/Room#: 2130/2130-A  Discharging Unit Phone Number: [phone]    Emergency Contact:   Extended Emergency Contact Information  Primary Emergency Contact: [address] Phone: [phone]  Relation: Child   needed? No  [facility] Phone: [phone]  Relation: Child   needed? No    Past Surgical History:  Past Surgical History:   Procedure Laterality Date    APPENDECTOMY      KNEE SURGERY      PACEMAKER INSERTION      AV sequential pacer    PACEMAKER INSERTION N/A 2020    PACEMAKER INSERTION PERMANENT performed by Kenia Beaver MD at [place] [address]  2020    AV sequential       Immunization History: There is no immunization history on file for this patient.     Active Problems:  Patient Active Problem List   Diagnosis Code    Complete heart block (HCC) I44.2       Isolation/Infection:   Isolation            No Isolation          Patient Infection Status       Infection Onset Added Last Indicated Last Indicated By Review Planned Expiration Resolved Resolved By    None active    Resolved    COVID-19 Rule Out 20 COVID-19 (Ordered)   20 Rule-Out Test Resulted            Nurse Assessment:  Last Vital Signs: BP done    Treatments at the Time of Hospital Discharge:   Respiratory Treatments: nonr  Oxygen Therapy:  is not on home oxygen therapy. Ventilator:    - No ventilator support    Rehab Therapies: Physical Therapy and Occupational Therapy  Weight Bearing Status/Restrictions: Avoid bearing weight LUE due to pacer  Other Medical Equipment (for information only, NOT a DME order):  PT/OT  Other Treatments: see below    Prognosis: Fair    Condition at Discharge: Stable    Rehab Potential (if transferring to Rehab): Good    Recommended Labs or Other Treatments After Discharge:    TSH, B12, and ammonia in one week  CBC and BMP in one week    After January 5, 2021, please change Tylenol 1 g 3 times a day to 1 g 3 times a day as needed. Physician Certification: I certify the above information and transfer of Nathaniel Olsen  is necessary for the continuing treatment of the diagnosis listed and that she requires [facility] for less 30 days.      Update Admission H&P: No change in H&P    PHYSICIAN SIGNATURE:  Electronically signed by Yvette Churchill MD on 12/29/20 at 5:11 PM EST

## 2020-12-29 NOTE — CARE COORDINATION
Patient may be discharged to [facility] today if medically stable. Need SOL completed. Ren Dolan RN    [id number] Spoke to [employer] at [facility]. Patient may admit today. PAS RR completed. Awaiting SOL. Completion. Ren Dolan RN    [address] [place];  time is 6 pm. Attempted to reach son Aurora Lipoma at [phone]; no answer.  Ren Dolan RN

## 2020-12-29 NOTE — DISCHARGE SUMMARY
Discharge Summary    Name:  Fayetta Romberg /Age/Sex: 1935  (81 y.o. female)   MRN & CSN:  [id number] & [id number] Admission Date/Time: 2020  9:37 PM   Attending:  Gamaliel Rodriguez MD Discharging Physician: Janeth Flores MD     HPI:     Per H&P:  Fayetta Romberg is a 81 y.o. female with hypertension, COPD, chronic respiratory failure on oxygen as needed who was transferred to ER from extended care facility due to confusion. Patient is currently awake, alert, oriented x3, appears to have short-term memory loss, reported that she was not feeling right since the last few days and hence her family members wanted her to be checked out. Patient denied any fever, chills, cough, chest pain, shortness of breath, denied any dizziness, lightheadedness. Patient reported having a fall 2 months ago, could not recall how she fell down-thinks that she might of tripped and fell. Patient reports that she usually ambulates without any assistance. Initial vitals in [facility] ED-/88, HR 44, RR 18, temperature 97.5, saturating 97% on room air. EKG revealed third-degree heart block. Cardiology was consulted and patient transferred to [facility].  CBC, CMP within normal limits except hemoglobin 11.2, troponin less than 0.010. X-ray chest-cardiomegaly. Rapid Covid negative. Hospital Course:     Imer Farnsworth is a pleasant 80-year-old resident of [facility] who presented to the ED in [facility] where she stated that she had not been feeling right for a couple days. She was found to be in third-degree AV block in ED, and was transferred to [facility] and was admitted. While here she had a dual-chamber pacer placed on  by Dr. Mica Pryor. She will be transferred to [facility] today for acute rehab. She was here for 4 days.      Problem List     Complete heart block s/p pacer on 2020  -follow up with cardiology  -of note, she was not on any negative inotropic drugs prior to admission  -Rx for oxycodone provided (has not been using it much here)    Encephalopathy (delirium)   -she had a sitter in her room today. Per note in the chart she was in restraints earlier this admission. -her son stated that her memory has been declining for a couple years, and that he had an appointment to take her to the physician to get this evaluated tomorrow. Advised him to reschedule the appointment for a few weeks to make sure to keep it.  -I assumed care today. We will ask the facility to check a TSH, B12 and ammonia level. Abnormal chest x-ray: It was read as showing the following: Nonspecific right paratracheal opacity. Recommend further evaluation with CT.   -Recommend outpatient follow-up for this  -I spoke with her PCPs nurse today and faxed over the report    Left wrist pain  -Wrist x-ray showed moderate to severe osteoarthritis,  -Tylenol 1 g 3 times a day was started  -Continue outpatient follow-up    COPD  -Stable at this time, continue Symbicort  -She is not on oxygen at the facility according to her son  -Current echo shows moderate pulmonary hypertension - RSVP is 50    Chronic respiratory failure  -Diagnosis noted per chart  -She was on 2 L on admission and was on 4 L last night, and she is on room air today and O2 sats of 93 to 98%    HTN  -Resume losartan  -She was on Norvasc as outpatient, it was not restarted on admission, and I will not resume it upon dc  -Cardiology started her on HCTZ 12.5 mg daily while here, so will restart that upon dc    HLD  -Resume Lipitor    Body mass index is 30 kg/m².      Social History  -She was a resident of [facility] and then moved to [place] in Feb 2019 - her children moved her here because they could not keep an eye on her over there  -I spoke with her son Krissy Uribe whom she wanted contacted per , however he referred me to Warren Portillo who is more familiar with the details of her care  -She was living at [facility], an assisted living facility, prior to admission, and her meds were provided through the pharmacy they work with    Consults this admission:  IP CONSULT TO [address] TO [address] [facility]    Discharge Instruction:   Follow up appointments: cardiology  Primary care physician:  within 2 weeks    Diet:  cardiac diet   Activity: activity as tolerated  Disposition: Discharged to:   []Home, []HHC, [x]SNF, []Acute Rehab, []Hospice   Condition on discharge: Stable    Discharge Medications:      Nicki, Betsy Greer Medication Instructions ISL:[id number]    Printed on:12/29/20 8264   Medication Information                      acetaminophen (TYLENOL) 500 MG tablet  Take 2 tablets by mouth every 8 hours for 7 days After January 5, 2021, change this medication to prn             albuterol sulfate HFA (VENTOLIN HFA) 108 (90 Base) MCG/ACT inhaler  Inhale 2 puffs into the lungs every 6 hours as needed for Wheezing             aspirin 81 MG chewable tablet  Take 81 mg by mouth daily             atorvastatin (LIPITOR) 10 MG tablet  Take 10 mg by mouth daily             budesonide-formoterol (SYMBICORT) 160-4.5 MCG/ACT AERO  Inhale 2 puffs into the lungs 2 times daily             ferrous sulfate (IRON 325) 325 (65 Fe) MG tablet  Take 1 tablet by mouth every other day             hydroCHLOROthiazide (HYDRODIURIL) 12.5 MG tablet  Take 1 tablet by mouth daily             losartan (COZAAR) 100 MG tablet  Take 100 mg by mouth daily             oxyCODONE (ROXICODONE) 5 MG immediate release tablet  Take 1 tablet by mouth every 8 hours as needed for Pain for up to 3 days.              pantoprazole (PROTONIX) 20 MG tablet  Take 20 mg by mouth daily             sennosides-docusate sodium (SENOKOT-S) 8.6-50 MG tablet  Take 2 tablets by mouth daily as needed for Constipation             vitamin D (CHOLECALCIFEROL) 25 MCG (1000 UT) TABS tablet  Take 1,000 Units by mouth daily                 Objective Findings at Discharge:   BP (!) 158/79   Pulse 70   Temp 98.3 °F (36.8 °C) (Oral)   Resp 21   Ht 5' 9.02\" (1.753 m)   Wt 203 lb 4.2 oz (92.2 kg)   SpO2 98%   BMI 30.00 kg/m²            PHYSICAL EXAM   GEN Awake female, sitting upright in bed in no apparent distress. Appears given age. When I saw her she was pleasantly confused  -She was not sure why \"they put me in here\"  -She had a sitter in her room  -When I tried to orient her and told her that she lives in [place] and she is in [facility] right now she states that \"I do not live anywhere right now because my kids are selling my house\"  -spoke with her nurse Brandon Bowers who indicated patient is doing well and walked with therapy      LABS:    CBC:   Lab Results   Component Value Date    WBC 9.6 12/29/2020    HGB 11.2 12/29/2020    HCT 36.1 12/29/2020    MCV 97.3 12/29/2020     12/29/2020     BMP:   Lab Results   Component Value Date     12/29/2020    K 3.8 12/29/2020    CL 96 12/29/2020    CO2 30 12/29/2020    BUN 20 12/29/2020    CREATININE 0.9 12/29/2020    CALCIUM 9.7 12/29/2020     IMAGING:    XR WRIST LEFT (MIN 3 VIEWS) [[id number]] Collected: 12/28/20 1016      Order Status: Completed Updated: 12/28/20 1025     Narrative:       EXAMINATION:   3 XRAY VIEWS OF THE LEFT WRIST     12/28/2020 9:06 am     COMPARISON:   None. HISTORY:   ORDERING SYSTEM PROVIDED HISTORY: left wrist pain   TECHNOLOGIST PROVIDED HISTORY:   Reason for exam:->left wrist pain   Reason for Exam: left wrist pain     FINDINGS:   No acute fracture or dislocation. Moderate to severe osteoarthritis,   greatest at the thumb [facility] joint, with hypertrophic change of the distal ulna. Diffuse osteopenia. Alignment is appropriate. Impression:       Moderate to severe osteoarthritis, greatest at the thumb [facility] joint.       XR CHEST PORTABLE [[id number]] Collected: 12/27/20 1233     Order Status: Completed Updated: 12/27/20 1312     Narrative:       EXAMINATION:   ONE XRAY VIEW OF THE CHEST     12/27/2020 11:21 am     COMPARISON:

## 2020-12-29 NOTE — PROGRESS NOTES
Physical Therapy    Physical Therapy Treatment Note  Name: Shonna Alarcon MRN: [id number] :   1935   Date:  2020   Admission Date: 2020 Room:  [address] [place], [place] [address]   Restrictions/Precautions:  Restrictions/Precautions  Restrictions/Precautions: General Precautions, Fall Risk  Implants present? : Pacemaker(pacemaker precautions)       Communication with other providers:  David Deutsch RN states pt is ok to see for therapy  Subjective:  Patient states:  She has to go tot the restroom, she just came back from [place]  Pain:   Location, Type, Intensity (0/10 to 10/10):  No c/o  Objective:    Observation:  Pt was in bed  Treatment, including education/measures:  Transfers  Supine to sit :min a of 1  Scooting :min A of 1  Sit to stand :min A and VC's for hand placements, attempted to educate pt on pacemaker precautions but pt having  Difficulty following directions  Stand to sit :VC and TC's for hand placements and initiating sitting with min to mod A   Gait:  Pt amb with CW for 6 ft and 110 ft with min assist of 1  Pt needed VC's for pathway and posture  Gait Comments: with VC's' and TC's for B LE placement, walker placement and sequence throughout ambulation; with VC's and TC's to maintain upright posture in order to avoid COM shifting outside of LUPE; with VC's for PLB throughout ambulation  Safety  Patient left safely in the chair, with call light in reach with sitter in the room. Gait belt and mask were used for transfers and gait.   Assessment / Impression:     Patient's tolerance of treatment:  Good, less assist today but confused   Adverse Reaction: none  Significant change in status and impact:  none  Barriers to improvement:  confusion  Plan for Next Session:    Will cont to work towards pt's goals per her tolerance  Time in:  0900  Time out:  0945  Timed treatment minutes:  45  Total treatment time:  39  Previously filed items:  Social/Functional History  Lives With: Family(sister)  Type of Home: [employer] Subjective:       Patient ID: Gerda Lai is a 86 y.o. female.    Chief Complaint: Hypertension    Disclaimer: This note has been generated using voice-recognition software. There may be typographical errors that have been missed during proof-reading      87 yo presents today for follow up of hypertension.  She was seen with elevated BP and Labetalol was increased to 200mg bid.  Since that time, her BP has ranged from 120-150 systolics and 50-70 diastolics.  She has noted recurrent dizziness with change in position, no history of syncope.          Hypertension   Pertinent negatives include no chest pain, headaches, palpitations or shortness of breath.     Review of Systems   Constitutional: Positive for fatigue. Negative for activity change and unexpected weight change.   Respiratory: Negative for chest tightness and shortness of breath.    Cardiovascular: Negative for chest pain, palpitations and leg swelling.   Neurological: Positive for dizziness and light-headedness. Negative for speech difficulty and headaches.       Objective:      Physical Exam   Constitutional: She is oriented to person, place, and time. She appears well-developed and well-nourished. No distress.   Neck: Normal range of motion. No JVD present. No thyromegaly present.   Cardiovascular:   No murmur heard.  Pulse 60 regular   Pulmonary/Chest: Effort normal and breath sounds normal. She has no wheezes. She has no rales.   Musculoskeletal: She exhibits no edema.   Lymphadenopathy:     She has no cervical adenopathy.   Neurological: She is alert and oriented to person, place, and time.   Skin: Skin is warm.       Assessment:       1. Essential hypertension        Plan:        1.  Decrease Labetalol to 100mg bid  2.  F/u 2 weeks

## 2020-12-29 NOTE — PLAN OF CARE
Problem: Falls - Risk of:  Goal: Will remain free from falls  Description: Will remain free from falls  12/28/2020 2151 by Josh Cortez RN  Outcome: Ongoing  12/28/2020 1917 by Cale Candelario RN  Outcome: Ongoing  Goal: Absence of physical injury  Description: Absence of physical injury  12/28/2020 2151 by Josh Cortez RN  Outcome: Ongoing  12/28/2020 1917 by Cale Candelario RN  Outcome: Ongoing     Problem: Skin Integrity:  Goal: Will show no infection signs and symptoms  Description: Will show no infection signs and symptoms  12/28/2020 2151 by Josh Cortez RN  Outcome: Ongoing  12/28/2020 1917 by Cale Candelario RN  Outcome: Ongoing  Goal: Absence of new skin breakdown  Description: Absence of new skin breakdown  12/28/2020 2151 by Josh Cortez RN  Outcome: Ongoing  12/28/2020 1917 by Cale Candelario RN  Outcome: Ongoing     Problem: SAFETY  Goal: Free from accidental physical injury  12/28/2020 2151 by Josh Cortez RN  Outcome: Ongoing  12/28/2020 1917 by Cale Candelario RN  Outcome: Ongoing     Problem: DAILY CARE  Goal: Daily care needs are met  12/28/2020 2151 by Josh Cortez RN  Outcome: Ongoing  12/28/2020 1917 by Cale Candelario RN  Outcome: Ongoing     Problem: PAIN  Goal: Patient's pain/discomfort is manageable  12/28/2020 2151 by Josh Cortez RN  Outcome: Ongoing  12/28/2020 1917 by Cale Candelario RN  Outcome: Ongoing     Problem: KNOWLEDGE DEFICIT  Goal: Patient/S.O. demonstrates understanding of disease process, treatment plan, medications, and discharge instructions.   12/28/2020 2151 by Josh Cortez RN  Outcome: Ongoing  12/28/2020 1917 by Cale Candelario RN  Outcome: Ongoing     Problem: DISCHARGE BARRIERS  Goal: Patient's continuum of care needs are met  12/28/2020 2151 by Josh Cortez RN  Outcome: Ongoing  12/28/2020 1917 by Cale Candelario RN  Outcome: Ongoing     Problem: Confusion - Acute:  Goal: Absence of continued neurological deterioration signs and symptoms  Description: Absence of continued neurological deterioration signs and symptoms  12/28/2020 2151 by Uma Shah RN  Outcome: Ongoing  12/28/2020 1917 by Shannon Guillen RN  Outcome: Ongoing  Goal: Mental status will be restored to baseline  Description: Mental status will be restored to baseline  12/28/2020 2151 by Uma Shah RN  Outcome: Ongoing  12/28/2020 1917 by Shannon Guillen RN  Outcome: Ongoing     Problem: Discharge Planning:  Goal: Ability to perform activities of daily living will improve  Description: Ability to perform activities of daily living will improve  12/28/2020 2151 by Uma Shah RN  Outcome: Ongoing  12/28/2020 1917 by Shannon Guillen RN  Outcome: Ongoing  Goal: Participates in care planning  Description: Participates in care planning  12/28/2020 2151 by Uma Shah RN  Outcome: Ongoing  12/28/2020 1917 by Shannon Guillen RN  Outcome: Ongoing     Problem: Injury - Risk of, Physical Injury:  Goal: Will remain free from falls  Description: Will remain free from falls  12/28/2020 2151 by Uma Shah RN  Outcome: Ongoing  12/28/2020 1917 by Shannon Guillen RN  Outcome: Ongoing  Goal: Absence of physical injury  Description: Absence of physical injury  12/28/2020 2151 by Uma Shah RN  Outcome: Ongoing  12/28/2020 1917 by Shannon Guillen RN  Outcome: Ongoing     Problem: Mood - Altered:  Goal: Mood stable  Description: Mood stable  12/28/2020 2151 by Uma Shah RN  Outcome: Ongoing  12/28/2020 1917 by Shannon Guillen RN  Outcome: Ongoing  Goal: Absence of abusive behavior  Description: Absence of abusive behavior  12/28/2020 2151 by Uma Shah RN  Outcome: Ongoing  12/28/2020 1917 by Shannon Guillen RN  Outcome: Ongoing  Goal: Verbalizations of feeling emotionally comfortable while being cared for will increase  Description: Verbalizations of feeling emotionally comfortable while Varun Mcdonald RN  Outcome: Ongoing     Problem: Sleep Pattern Disturbance:  Goal: Appears well-rested  Description: Appears well-rested  12/28/2020 2151 by Patience Flores RN  Outcome: Ongoing  12/28/2020 1917 by Forest Samuels RN  Outcome: Ongoing     Problem: Restraint Use - Nonviolent/Non-Self-Destructive Behavior:  Goal: Absence of restraint indications  Description: Absence of restraint indications  12/28/2020 2151 by Patience Flores RN  Outcome: Ongoing  12/28/2020 1917 by Forest Samuels RN  Outcome: Ongoing  Goal: Absence of restraint-related injury  Description: Absence of restraint-related injury  12/28/2020 2151 by Patience Flores RN  Outcome: Ongoing  12/28/2020 1917 by Forest Samuels RN  Outcome: Ongoing     Problem: Pain:  Description: Pain management should include both nonpharmacologic and pharmacologic interventions.   Goal: Pain level will decrease  Description: Pain level will decrease  12/28/2020 2151 by Patience Flores RN  Outcome: Ongoing  12/28/2020 1917 by Forest Samuels RN  Outcome: Ongoing  Goal: Control of acute pain  Description: Control of acute pain  12/28/2020 2151 by Patience Flores RN  Outcome: Ongoing  12/28/2020 1917 by Forest Samuels RN  Outcome: Ongoing  Goal: Control of chronic pain  Description: Control of chronic pain  12/28/2020 2151 by Patience Flores RN  Outcome: Ongoing  12/28/2020 1917 by Forest Samuels RN  Outcome: Ongoing

## 2020-12-30 NOTE — PROGRESS NOTES
Physician Progress Note      PATIENTJed Stewart  [facility] #:                  [id number]  :                       1935  ADMIT DATE:       2020 9:37 PM  [address] DATE:        2020 6:25 PM  RESPONDING  PROVIDER #:        Kia Raphael MD          QUERY TEXT:    Dear hospitalist,    Pt admitted with complete heart block and has CHF documented. If possible,   please document in progress notes and discharge summary further specificity   regarding the type and acuity of CHF:    The medical record reflects the following:  Risk Factors: history of CHF. Clinical Indicators: H&P and multiple progress notes state CHF. SOB noted, CXR   shows pulmonary vascular congestion, mild edema noted. Treatment: hydrochlorothiazide 12.5 daily, admission to ICU, labs, monitor,    cardiology consult, cardiac diet,  Options provided:  -- Acute on Chronic Systolic CHF/HFrEF  -- Acute on Chronic Diastolic CHF/HFpEF  -- Acute on Chronic Systolic and Diastolic CHF  -- Acute Systolic CHF/HFrEF  -- Acute Diastolic CHF/HFpEF  -- Acute Systolic and Diastolic CHF  -- Chronic Systolic CHF/HFrEF  -- Chronic Diastolic CHF/HFpEF  -- Chronic Systolic and Diastolic CHF  -- Other - I will add my own diagnosis  -- Disagree - Not applicable / Not valid  -- Disagree - Clinically unable to determine / Unknown  -- Refer to Clinical Documentation Reviewer    PROVIDER RESPONSE TEXT:    CHF appears in the \"past medical history\" section of the chart, but was not   diagnosed by cardiology while patient was here.     Query created by: Bj Gregroio on 2020 7:20 AM      Electronically signed by:  Kia Raphael MD 2020 12:08 PM

## 2020-12-31 NOTE — PROGRESS NOTES
Physician Progress Note      Marisela March  [facility] #:                  [id number]  :                       1935  ADMIT DATE:       2020 9:37 PM  [address] DATE:        2020 6:25 PM  RESPONDING  PROVIDER #:        Milady Molina MD          QUERY TEXT:    Dear hospitalist,    Pt admitted with complete heart block and has encephalopathy documented. If   possible, please document in progress notes and discharge summary further   specificity regarding the type of encephalopathy:    The medical record reflects the following:  Risk Factors: her son stated that her memory has been declining for a couple   years,  Clinical Indicators: H&P:  81 y.o. female with hypertension, COPD, chronic   respiratory failure on oxygen as needed who was transferred to ER from   extended care facility due to confusion. In discharge summary states   :Encephalopathy (delirium)  -she had a sitter in her room today. Per note in   the chart she was in restraints earlier this admission. Treatment: admission, labs, monitor, sitter at bedside,  Options provided:  -- Metabolic encephalopathy  -- Toxic encephalopathy  -- Other - I will add my own diagnosis  -- Disagree - Not applicable / Not valid  -- Disagree - Clinically unable to determine / Unknown  -- Refer to Clinical Documentation Reviewer    PROVIDER RESPONSE TEXT:    This patient has metabolic encephalopathy.     Query created by: Sugey Jansen on 2020 7:41 AM      Electronically signed by:  Milady Molina MD 2020 9:12 AM

## 2021-03-08 ENCOUNTER — HOSPITAL ENCOUNTER (OUTPATIENT)
Dept: MAMMOGRAPHY | Age: 86
Discharge: HOME | End: 2021-03-08
Payer: MEDICARE

## 2021-03-08 ENCOUNTER — PROCEDURE VISIT (OUTPATIENT)
Dept: CARDIOLOGY CLINIC | Age: 86
End: 2021-03-08
Payer: MEDICARE

## 2021-03-08 DIAGNOSIS — M81.8 OTHER OSTEOPOROSIS WITHOUT CURRENT PATHOLOGICAL FRACTURE: ICD-10-CM

## 2021-03-08 DIAGNOSIS — Z95.0 CARDIAC PACEMAKER IN SITU: Primary | ICD-10-CM

## 2021-03-08 PROCEDURE — 77080 DXA BONE DENSITY AXIAL: CPT

## 2021-03-08 PROCEDURE — 93294 REM INTERROG EVL PM/LDLS PM: CPT | Performed by: INTERNAL MEDICINE

## 2021-03-08 PROCEDURE — 93296 REM INTERROG EVL PM/IDS: CPT | Performed by: INTERNAL MEDICINE

## 2021-03-12 ENCOUNTER — INITIAL CONSULT (OUTPATIENT)
Dept: CARDIOLOGY CLINIC | Age: 86
End: 2021-03-12
Payer: MEDICARE

## 2021-03-12 ENCOUNTER — TELEPHONE (OUTPATIENT)
Dept: CARDIOLOGY CLINIC | Age: 86
End: 2021-03-12

## 2021-03-12 VITALS
WEIGHT: 199 LBS | DIASTOLIC BLOOD PRESSURE: 82 MMHG | TEMPERATURE: 97.2 F | BODY MASS INDEX: 29.47 KG/M2 | SYSTOLIC BLOOD PRESSURE: 164 MMHG | OXYGEN SATURATION: 32 % | HEART RATE: 64 BPM | HEIGHT: 69 IN

## 2021-03-12 DIAGNOSIS — Z95.0 CARDIAC PACEMAKER IN SITU: Primary | ICD-10-CM

## 2021-03-12 DIAGNOSIS — I10 ESSENTIAL HYPERTENSION: ICD-10-CM

## 2021-03-12 DIAGNOSIS — I44.2 COMPLETE HEART BLOCK (HCC): ICD-10-CM

## 2021-03-12 DIAGNOSIS — R60.0 BILATERAL LEG EDEMA: ICD-10-CM

## 2021-03-12 DIAGNOSIS — E78.5 HYPERLIPIDEMIA, UNSPECIFIED HYPERLIPIDEMIA TYPE: ICD-10-CM

## 2021-03-12 PROCEDURE — 99213 OFFICE O/P EST LOW 20 MIN: CPT | Performed by: INTERNAL MEDICINE

## 2021-03-12 PROCEDURE — 1090F PRES/ABSN URINE INCON ASSESS: CPT | Performed by: INTERNAL MEDICINE

## 2021-03-12 PROCEDURE — 1036F TOBACCO NON-USER: CPT | Performed by: INTERNAL MEDICINE

## 2021-03-12 PROCEDURE — 1123F ACP DISCUSS/DSCN MKR DOCD: CPT | Performed by: INTERNAL MEDICINE

## 2021-03-12 PROCEDURE — G8484 FLU IMMUNIZE NO ADMIN: HCPCS | Performed by: INTERNAL MEDICINE

## 2021-03-12 PROCEDURE — G8399 PT W/DXA RESULTS DOCUMENT: HCPCS | Performed by: INTERNAL MEDICINE

## 2021-03-12 PROCEDURE — 93280 PM DEVICE PROGR EVAL DUAL: CPT | Performed by: INTERNAL MEDICINE

## 2021-03-12 PROCEDURE — G8427 DOCREV CUR MEDS BY ELIG CLIN: HCPCS | Performed by: INTERNAL MEDICINE

## 2021-03-12 PROCEDURE — 4040F PNEUMOC VAC/ADMIN/RCVD: CPT | Performed by: INTERNAL MEDICINE

## 2021-03-12 PROCEDURE — G8417 CALC BMI ABV UP PARAM F/U: HCPCS | Performed by: INTERNAL MEDICINE

## 2021-03-12 RX ORDER — SERTRALINE HYDROCHLORIDE 25 MG/1
1 TABLET, FILM COATED ORAL NIGHTLY
COMMUNITY
Start: 2021-02-06

## 2021-03-12 RX ORDER — AMLODIPINE BESYLATE 5 MG/1
5 TABLET ORAL DAILY
COMMUNITY

## 2021-03-12 RX ORDER — FUROSEMIDE 20 MG/1
20 TABLET ORAL DAILY
Qty: 90 TABLET | Refills: 1 | Status: SHIPPED | OUTPATIENT
Start: 2021-03-12 | End: 2021-03-19 | Stop reason: SDUPTHER

## 2021-03-12 NOTE — TELEPHONE ENCOUNTER
Genoveva with [facility] called asking for   The lasix that was prescribed today be sent   To [address]  She no longer uses [facility]

## 2021-03-12 NOTE — PROGRESS NOTES
Isatu Farooq MD                                  CARDIOLOGY  NOTE            Chief Complaint:    Chief Complaint   Patient presents with    Other     Consult for pacemaker. Complte heart block. HPI:     Pavan Bull is a 81y.o. year old female who presents as a follow-up after complete heart block status post permanent pacemaker. Patient was seen in the hospital in 2020 for dizziness lethargy and fatigue, when she was noted to have complete heart block. Patient denies any further syncope dizziness chest pain shortness of breath.  + LE edema    Echocardiogram 2020     Left ventricular systolic function is normal.   Ejection fraction is visually estimated at 50-55%. Mild left ventricular hypertrophy. Grade I diastolic dysfunction. PPM wiring visualized in right heart. Large mitral annular calcification present with mild mitral stenosis; mean   PmmHg. Mild mitral regurgitation. Moderate tricuspid regurgitation; RVSP is 50mmHg. Moderate PHTN present. No evidence of any pericardial effusion.       Current Outpatient Medications   Medication Sig Dispense Refill    amLODIPine (NORVASC) 5 MG tablet Take 5 mg by mouth daily      sertraline (ZOLOFT) 25 MG tablet Take 1 tablet by mouth daily      furosemide (LASIX) 20 MG tablet Take 1 tablet by mouth daily 90 tablet 1    ferrous sulfate (IRON 325) 325 (65 Fe) MG tablet Take 1 tablet by mouth every other day 30 tablet 0    sennosides-docusate sodium (SENOKOT-S) 8.6-50 MG tablet Take 2 tablets by mouth daily as needed for Constipation 1 tablet 0    hydroCHLOROthiazide (HYDRODIURIL) 12.5 MG tablet Take 1 tablet by mouth daily 30 tablet 0    aspirin 81 MG chewable tablet Take 81 mg by mouth daily      atorvastatin (LIPITOR) 10 MG tablet Take 10 mg by mouth daily      budesonide-formoterol (SYMBICORT) 160-4.5 MCG/ACT AERO Inhale 2 puffs into the lungs 2 times daily      losartan (COZAAR) 100 MG tablet Take 100 mg by mouth daily      pantoprazole (PROTONIX) 20 MG tablet Take 20 mg by mouth daily      albuterol sulfate HFA (VENTOLIN HFA) 108 (90 Base) MCG/ACT inhaler Inhale 2 puffs into the lungs every 6 hours as needed for Wheezing      vitamin D (CHOLECALCIFEROL) 25 MCG (1000 UT) TABS tablet Take 1,000 Units by mouth daily      acetaminophen (TYLENOL) 500 MG tablet Take 2 tablets by mouth every 8 hours for 7 days After January 5, 2021, change this medication to prn 1 tablet 0     No current facility-administered medications for this visit. Allergies:     Lisinopril, Pcn [penicillins], and Sulfa antibiotics    Patient History:    Past Medical History:   Diagnosis Date    Arthrofibrosis of total knee arthroplasty ([facility] [address]     bilateral    Asthma     CAD (coronary artery disease)     CHF (congestive heart failure) (HCC)     Constipation     DDD (degenerative disc disease), cervical     Diabetes mellitus (HCC)     Femur fracture, right (HCC)     distal femur    GERD (gastroesophageal reflux disease)     Hypertension     Venous stasis      Past Surgical History:   Procedure Laterality Date    APPENDECTOMY      KNEE SURGERY      PACEMAKER INSERTION      AV sequential pacer    PACEMAKER INSERTION N/A 12/27/2020    PACEMAKER INSERTION PERMANENT performed by Samia Courtney MD at [facility]  12/27/2020    AV sequential     No family history on file. Social History     Tobacco Use    Smoking status: Never Smoker    Smokeless tobacco: Never Used   Substance Use Topics    Alcohol use: Not Currently        Review of Systems:     · Constitutional:  No Fever or Weight Loss   · Eyes: No Decreased Vision  · ENT: No Headaches, Hearing Loss or Vertigo  · Cardiovascular: No Chest Pain,  No Shortness of breath, No Palpitations. + Edema   · Respiratory: No cough or wheezing .  No Respiratory distress   · Gastrointestinal: No abdominal pain, appetite loss, blood in stools, constipation, diarrhea or heartburn  · Genitourinary: No dysuria, trouble voiding, or hematuria  · Musculoskeletal:  denies any new  joint aches , or pain   · Integumentary: No rash or pruritis  · Neurological: No TIA or stroke symptoms  · Psychiatric: No anxiety or depression  · Endocrine: No malaise, fatigue or temperature intolerance  · Hematologic/Lymphatic: No bleeding problems, blood clots or swollen lymph nodes  · Allergic/Immunologic: No nasal congestion or hives        Objective:      Physical Exam:    BP (!) 164/82   Pulse 64   Temp 97.2 °F (36.2 °C)   Ht 5' 9.02\" (1.753 m)   Wt 199 lb (90.3 kg)   SpO2 (!) 32%   BMI 29.37 kg/m²   Wt Readings from Last 3 Encounters:   03/12/21 199 lb (90.3 kg)   12/28/20 203 lb 4.2 oz (92.2 kg)   12/25/20 210 lb (95.3 kg)     Body mass index is 29.37 kg/m². Vitals:    03/12/21 0803   BP: (!) 164/82   Pulse: 64   Temp: 97.2 °F (36.2 °C)   SpO2: (!) 32%        General Appearance and Constitutional: Conversant, Well developed, Well nourished, No acute distress, Non-toxic appearance. HEENT:  Normocephalic, Atraumatic, Bilateral external ears normal, Oropharynx moist, No oral exudates,   Nose normal.   Neck- Normal range of motion, No tenderness, Supple  Eyes:  EOMI, Conjunctiva normal, No discharge. Respiratory:  Normal breath sounds, No respiratory distress, No wheezing, No Rales, No Ronchi. No chest tenderness. Cardiovascular: S1-S2, no added heart sounds, No Mumurs appreciated. No gallops, rubs. + 2 Pedal Edema   GI:  Bowel sounds normal, Soft, No tenderness,  :  No costovertebral angle tenderness   Musculoskeletal:  No gross deformities.  Back- No tenderness  Integument:  Well hydrated, no rash   Lymphatic:  No lymphadenopathy noted   Neurologic:  Alert & oriented x 3, Normal motor function, normal sensory function, no focal deficits noted   Psychiatric:  Speech and behavior appropriate       Medical decision making and Data review:    DATA:    Lab Results   Component Value Date TROPONINT <0.010 12/25/2020     BNP:  No results found for: PROBNP  PT/INR:  No results found for: PTINR  No results found for: LABA1C  No results found for: CHOL, TRIG, HDL, LDLCALC, LDLDIRECT  Lab Results   Component Value Date    WBC 9.6 12/29/2020    HGB 11.2 (L) 12/29/2020    HCT 36.1 (L) 12/29/2020    MCV 97.3 12/29/2020     12/29/2020     TSH: No results found for: TSH  Lab Results   Component Value Date    AST 21 12/26/2020    ALT 16 12/26/2020    BILITOT 0.5 12/26/2020    ALKPHOS 84 12/26/2020         All labs, medications and tests reviewed by myself including data and history from outside source , patient and available family . 1. Complete heart block ([address]    2. Essential hypertension    3. Hyperlipidemia, unspecified hyperlipidemia type    4. Bilateral leg edema         Impression and Plan:      1. Complete heart block status post permanent pacemaker. Pacemaker interrogation today in the office shows 99% V paced. EKG shows sinus rhythm V paced. Patient denies any further symptoms of dizziness lethargy or presyncope. 2. Lower extremity edema, mild pulmonary hypertension: Start patient on low-dose Lasix 20 mg daily. 3. Essential hypertension: Continue with Losartan hydrochlorothiazide/amlodipine. Added Lasix. 4. Hyperlipidemia: Continue with low-dose statin therapy  5. Obesity with BMI of 29.37 diet and Exercise as tolerated        Return in about 6 months (around 9/12/2021). Counseled extensively and medication compliance urged. We discussed that for the  prevention of ASCVD our  goal is aggressive risk modification. Patient is encouraged to exercise even a brisk walk for 30 minutes  at least 3 to 4 times a week   Various goals were discussed and questions answered. Continue current medications. Appropriate prescriptions are addressed and refills ordered. Questions answered and patient verbalizes understanding. Call for any problems, questions, or concerns.

## 2021-03-15 ENCOUNTER — APPOINTMENT (OUTPATIENT)
Dept: GENERAL RADIOLOGY | Age: 86
End: 2021-03-15
Payer: MEDICARE

## 2021-03-15 ENCOUNTER — APPOINTMENT (OUTPATIENT)
Dept: CT IMAGING | Age: 86
End: 2021-03-15
Payer: MEDICARE

## 2021-03-15 ENCOUNTER — HOSPITAL ENCOUNTER (OUTPATIENT)
Age: 86
Setting detail: OBSERVATION
Discharge: NURSING, REHAB OR LONG-TERM CARE | End: 2021-03-16
Attending: EMERGENCY MEDICINE | Admitting: INTERNAL MEDICINE
Payer: MEDICARE

## 2021-03-15 DIAGNOSIS — R07.9 CHEST PAIN, UNSPECIFIED TYPE: Primary | ICD-10-CM

## 2021-03-15 PROBLEM — R07.2 CHEST PAIN, PRECORDIAL: Status: ACTIVE | Noted: 2021-03-15

## 2021-03-15 LAB
ANION GAP SERPL CALCULATED.3IONS-SCNC: 5 MMOL/L (ref 4–16)
BASOPHILS ABSOLUTE: 0.1 K/CU MM
BASOPHILS RELATIVE PERCENT: 0.9 % (ref 0–1)
BUN BLDV-MCNC: 12 MG/DL (ref 6–23)
CALCIUM SERPL-MCNC: 10.9 MG/DL (ref 8.3–10.6)
CHLORIDE BLD-SCNC: 97 MMOL/L (ref 99–110)
CO2: 34 MMOL/L (ref 21–32)
CREAT SERPL-MCNC: 0.8 MG/DL (ref 0.6–1.1)
D DIMER: 768 NG/ML(DDU)
DIFFERENTIAL TYPE: ABNORMAL
EKG ATRIAL RATE: 66 BPM
EKG DIAGNOSIS: NORMAL
EKG P AXIS: 41 DEGREES
EKG P-R INTERVAL: 216 MS
EKG Q-T INTERVAL: 452 MS
EKG QRS DURATION: 154 MS
EKG QTC CALCULATION (BAZETT): 473 MS
EKG R AXIS: -67 DEGREES
EKG T AXIS: 71 DEGREES
EKG VENTRICULAR RATE: 66 BPM
EOSINOPHILS ABSOLUTE: 0.2 K/CU MM
EOSINOPHILS RELATIVE PERCENT: 3.2 % (ref 0–3)
GFR AFRICAN AMERICAN: >60 ML/MIN/1.73M2
GFR NON-AFRICAN AMERICAN: >60 ML/MIN/1.73M2
GLUCOSE BLD-MCNC: 129 MG/DL (ref 70–99)
HCT VFR BLD CALC: 36 % (ref 37–47)
HEMOGLOBIN: 11.1 GM/DL (ref 12.5–16)
IMMATURE NEUTROPHIL %: 0.4 % (ref 0–0.43)
LYMPHOCYTES ABSOLUTE: 1.8 K/CU MM
LYMPHOCYTES RELATIVE PERCENT: 23.6 % (ref 24–44)
MCH RBC QN AUTO: 29.3 PG (ref 27–31)
MCHC RBC AUTO-ENTMCNC: 30.8 % (ref 32–36)
MCV RBC AUTO: 95 FL (ref 78–100)
MONOCYTES ABSOLUTE: 0.8 K/CU MM
MONOCYTES RELATIVE PERCENT: 10.3 % (ref 0–4)
PDW BLD-RTO: 13.9 % (ref 11.7–14.9)
PLATELET # BLD: 265 K/CU MM (ref 140–440)
PMV BLD AUTO: 9.1 FL (ref 7.5–11.1)
POTASSIUM SERPL-SCNC: 3.6 MMOL/L (ref 3.5–5.1)
RBC # BLD: 3.79 M/CU MM (ref 4.2–5.4)
SEGMENTED NEUTROPHILS ABSOLUTE COUNT: 4.7 K/CU MM
SEGMENTED NEUTROPHILS RELATIVE PERCENT: 61.6 % (ref 36–66)
SODIUM BLD-SCNC: 136 MMOL/L (ref 135–145)
TOTAL IMMATURE NEUTOROPHIL: 0.03 K/CU MM
TROPONIN T: <0.01 NG/ML
WBC # BLD: 7.6 K/CU MM (ref 4–10.5)

## 2021-03-15 PROCEDURE — 36415 COLL VENOUS BLD VENIPUNCTURE: CPT

## 2021-03-15 PROCEDURE — 96376 TX/PRO/DX INJ SAME DRUG ADON: CPT

## 2021-03-15 PROCEDURE — 6370000000 HC RX 637 (ALT 250 FOR IP): Performed by: EMERGENCY MEDICINE

## 2021-03-15 PROCEDURE — 99285 EMERGENCY DEPT VISIT HI MDM: CPT

## 2021-03-15 PROCEDURE — 71045 X-RAY EXAM CHEST 1 VIEW: CPT

## 2021-03-15 PROCEDURE — 80048 BASIC METABOLIC PNL TOTAL CA: CPT

## 2021-03-15 PROCEDURE — 93005 ELECTROCARDIOGRAM TRACING: CPT | Performed by: EMERGENCY MEDICINE

## 2021-03-15 PROCEDURE — 2500000003 HC RX 250 WO HCPCS: Performed by: INTERNAL MEDICINE

## 2021-03-15 PROCEDURE — G0378 HOSPITAL OBSERVATION PER HR: HCPCS

## 2021-03-15 PROCEDURE — 93010 ELECTROCARDIOGRAM REPORT: CPT | Performed by: INTERNAL MEDICINE

## 2021-03-15 PROCEDURE — 71275 CT ANGIOGRAPHY CHEST: CPT

## 2021-03-15 PROCEDURE — 96374 THER/PROPH/DIAG INJ IV PUSH: CPT

## 2021-03-15 PROCEDURE — 84484 ASSAY OF TROPONIN QUANT: CPT

## 2021-03-15 PROCEDURE — 94640 AIRWAY INHALATION TREATMENT: CPT

## 2021-03-15 PROCEDURE — 96372 THER/PROPH/DIAG INJ SC/IM: CPT

## 2021-03-15 PROCEDURE — 85025 COMPLETE CBC W/AUTO DIFF WBC: CPT

## 2021-03-15 PROCEDURE — 6360000002 HC RX W HCPCS: Performed by: INTERNAL MEDICINE

## 2021-03-15 PROCEDURE — 85379 FIBRIN DEGRADATION QUANT: CPT

## 2021-03-15 PROCEDURE — 6360000004 HC RX CONTRAST MEDICATION: Performed by: EMERGENCY MEDICINE

## 2021-03-15 PROCEDURE — 2580000003 HC RX 258: Performed by: INTERNAL MEDICINE

## 2021-03-15 PROCEDURE — 6370000000 HC RX 637 (ALT 250 FOR IP): Performed by: INTERNAL MEDICINE

## 2021-03-15 RX ORDER — AMMONIUM LACTATE 12 G/100G
CREAM TOPICAL PRN
Status: ON HOLD | COMMUNITY
End: 2022-04-22

## 2021-03-15 RX ORDER — ASPIRIN 81 MG/1
81 TABLET, CHEWABLE ORAL DAILY
Status: DISCONTINUED | OUTPATIENT
Start: 2021-03-16 | End: 2021-03-15 | Stop reason: SDUPTHER

## 2021-03-15 RX ORDER — LOSARTAN POTASSIUM 100 MG/1
100 TABLET ORAL DAILY
Status: DISCONTINUED | OUTPATIENT
Start: 2021-03-16 | End: 2021-03-16 | Stop reason: HOSPADM

## 2021-03-15 RX ORDER — ALBUTEROL SULFATE 2.5 MG/3ML
2.5 SOLUTION RESPIRATORY (INHALATION) EVERY 6 HOURS PRN
Status: DISCONTINUED | OUTPATIENT
Start: 2021-03-15 | End: 2021-03-16 | Stop reason: HOSPADM

## 2021-03-15 RX ORDER — BUDESONIDE AND FORMOTEROL FUMARATE DIHYDRATE 160; 4.5 UG/1; UG/1
2 AEROSOL RESPIRATORY (INHALATION) 2 TIMES DAILY
Status: DISCONTINUED | OUTPATIENT
Start: 2021-03-15 | End: 2021-03-16 | Stop reason: HOSPADM

## 2021-03-15 RX ORDER — FERROUS SULFATE 325(65) MG
325 TABLET ORAL EVERY OTHER DAY
Status: DISCONTINUED | OUTPATIENT
Start: 2021-03-16 | End: 2021-03-16 | Stop reason: HOSPADM

## 2021-03-15 RX ORDER — SENNA AND DOCUSATE SODIUM 50; 8.6 MG/1; MG/1
2 TABLET, FILM COATED ORAL DAILY PRN
Status: DISCONTINUED | OUTPATIENT
Start: 2021-03-15 | End: 2021-03-16 | Stop reason: HOSPADM

## 2021-03-15 RX ORDER — ATORVASTATIN CALCIUM 10 MG/1
10 TABLET, FILM COATED ORAL DAILY
Status: DISCONTINUED | OUTPATIENT
Start: 2021-03-15 | End: 2021-03-15 | Stop reason: DRUGHIGH

## 2021-03-15 RX ORDER — SODIUM CHLORIDE 0.9 % (FLUSH) 0.9 %
10 SYRINGE (ML) INJECTION PRN
Status: DISCONTINUED | OUTPATIENT
Start: 2021-03-15 | End: 2021-03-16 | Stop reason: HOSPADM

## 2021-03-15 RX ORDER — PANTOPRAZOLE SODIUM 20 MG/1
20 TABLET, DELAYED RELEASE ORAL DAILY
Status: DISCONTINUED | OUTPATIENT
Start: 2021-03-16 | End: 2021-03-16 | Stop reason: HOSPADM

## 2021-03-15 RX ORDER — NITROGLYCERIN 0.4 MG/1
0.4 TABLET SUBLINGUAL EVERY 5 MIN PRN
Status: DISCONTINUED | OUTPATIENT
Start: 2021-03-15 | End: 2021-03-16 | Stop reason: HOSPADM

## 2021-03-15 RX ORDER — ACETAMINOPHEN 325 MG/1
650 TABLET ORAL EVERY 6 HOURS PRN
Status: DISCONTINUED | OUTPATIENT
Start: 2021-03-15 | End: 2021-03-16 | Stop reason: HOSPADM

## 2021-03-15 RX ORDER — CALCIUM CARBONATE 200(500)MG
500 TABLET,CHEWABLE ORAL 3 TIMES DAILY PRN
Status: DISCONTINUED | OUTPATIENT
Start: 2021-03-15 | End: 2021-03-16 | Stop reason: HOSPADM

## 2021-03-15 RX ORDER — SODIUM CHLORIDE 0.9 % (FLUSH) 0.9 %
10 SYRINGE (ML) INJECTION EVERY 12 HOURS SCHEDULED
Status: DISCONTINUED | OUTPATIENT
Start: 2021-03-15 | End: 2021-03-16 | Stop reason: HOSPADM

## 2021-03-15 RX ORDER — ACETAMINOPHEN 500 MG
1000 TABLET ORAL EVERY 8 HOURS SCHEDULED
Status: DISCONTINUED | OUTPATIENT
Start: 2021-03-15 | End: 2021-03-16 | Stop reason: HOSPADM

## 2021-03-15 RX ORDER — IBUPROFEN 600 MG/1
600 TABLET ORAL EVERY 8 HOURS PRN
Status: ON HOLD | COMMUNITY
End: 2022-05-12 | Stop reason: HOSPADM

## 2021-03-15 RX ORDER — PROMETHAZINE HYDROCHLORIDE 12.5 MG/1
12.5 TABLET ORAL EVERY 6 HOURS PRN
Status: DISCONTINUED | OUTPATIENT
Start: 2021-03-15 | End: 2021-03-16 | Stop reason: HOSPADM

## 2021-03-15 RX ORDER — AMLODIPINE BESYLATE 5 MG/1
5 TABLET ORAL DAILY
Status: DISCONTINUED | OUTPATIENT
Start: 2021-03-16 | End: 2021-03-16 | Stop reason: HOSPADM

## 2021-03-15 RX ORDER — POLYETHYLENE GLYCOL 3350 17 G/17G
17 POWDER, FOR SOLUTION ORAL DAILY PRN
Status: DISCONTINUED | OUTPATIENT
Start: 2021-03-15 | End: 2021-03-16 | Stop reason: HOSPADM

## 2021-03-15 RX ORDER — CALCIUM CARBONATE 200(500)MG
500 TABLET,CHEWABLE ORAL 3 TIMES DAILY PRN
Status: DISCONTINUED | OUTPATIENT
Start: 2021-03-15 | End: 2021-03-15 | Stop reason: SDUPTHER

## 2021-03-15 RX ORDER — FUROSEMIDE 20 MG/1
20 TABLET ORAL DAILY
Status: DISCONTINUED | OUTPATIENT
Start: 2021-03-16 | End: 2021-03-16 | Stop reason: HOSPADM

## 2021-03-15 RX ORDER — ACETAMINOPHEN 650 MG/1
650 SUPPOSITORY RECTAL EVERY 6 HOURS PRN
Status: DISCONTINUED | OUTPATIENT
Start: 2021-03-15 | End: 2021-03-16 | Stop reason: HOSPADM

## 2021-03-15 RX ORDER — HYDROCHLOROTHIAZIDE 12.5 MG/1
12.5 CAPSULE, GELATIN COATED ORAL DAILY
Status: DISCONTINUED | OUTPATIENT
Start: 2021-03-16 | End: 2021-03-16 | Stop reason: HOSPADM

## 2021-03-15 RX ORDER — ATORVASTATIN CALCIUM 40 MG/1
40 TABLET, FILM COATED ORAL NIGHTLY
Status: DISCONTINUED | OUTPATIENT
Start: 2021-03-15 | End: 2021-03-16 | Stop reason: HOSPADM

## 2021-03-15 RX ORDER — ONDANSETRON 2 MG/ML
4 INJECTION INTRAMUSCULAR; INTRAVENOUS EVERY 6 HOURS PRN
Status: DISCONTINUED | OUTPATIENT
Start: 2021-03-15 | End: 2021-03-16 | Stop reason: HOSPADM

## 2021-03-15 RX ORDER — VITAMIN B COMPLEX
1000 TABLET ORAL DAILY
Status: DISCONTINUED | OUTPATIENT
Start: 2021-03-16 | End: 2021-03-16 | Stop reason: HOSPADM

## 2021-03-15 RX ORDER — SERTRALINE HYDROCHLORIDE 25 MG/1
25 TABLET, FILM COATED ORAL DAILY
Status: DISCONTINUED | OUTPATIENT
Start: 2021-03-16 | End: 2021-03-16 | Stop reason: HOSPADM

## 2021-03-15 RX ORDER — ASPIRIN 81 MG/1
81 TABLET ORAL DAILY
Status: DISCONTINUED | OUTPATIENT
Start: 2021-03-16 | End: 2021-03-16 | Stop reason: HOSPADM

## 2021-03-15 RX ADMIN — FAMOTIDINE 20 MG: 10 INJECTION, SOLUTION INTRAVENOUS at 20:33

## 2021-03-15 RX ADMIN — FAMOTIDINE 20 MG: 10 INJECTION, SOLUTION INTRAVENOUS at 15:41

## 2021-03-15 RX ADMIN — NITROGLYCERIN 0.4 MG: 0.4 TABLET SUBLINGUAL at 10:13

## 2021-03-15 RX ADMIN — IOPAMIDOL 75 ML: 755 INJECTION, SOLUTION INTRAVENOUS at 11:12

## 2021-03-15 RX ADMIN — ATORVASTATIN CALCIUM 40 MG: 40 TABLET, FILM COATED ORAL at 20:33

## 2021-03-15 RX ADMIN — ACETAMINOPHEN 1000 MG: 500 TABLET ORAL at 15:40

## 2021-03-15 RX ADMIN — SODIUM CHLORIDE, PRESERVATIVE FREE 10 ML: 5 INJECTION INTRAVENOUS at 20:33

## 2021-03-15 RX ADMIN — BUDESONIDE AND FORMOTEROL FUMARATE DIHYDRATE 2 PUFF: 160; 4.5 AEROSOL RESPIRATORY (INHALATION) at 19:32

## 2021-03-15 RX ADMIN — ACETAMINOPHEN 1000 MG: 500 TABLET ORAL at 22:29

## 2021-03-15 RX ADMIN — ENOXAPARIN SODIUM 40 MG: 40 INJECTION SUBCUTANEOUS at 15:41

## 2021-03-15 ASSESSMENT — PAIN SCALES - GENERAL
PAINLEVEL_OUTOF10: 5
PAINLEVEL_OUTOF10: 0
PAINLEVEL_OUTOF10: 4
PAINLEVEL_OUTOF10: 3
PAINLEVEL_OUTOF10: 0
PAINLEVEL_OUTOF10: 7

## 2021-03-15 ASSESSMENT — PAIN DESCRIPTION - PAIN TYPE: TYPE: ACUTE PAIN

## 2021-03-15 ASSESSMENT — PAIN DESCRIPTION - LOCATION
LOCATION: CHEST

## 2021-03-15 ASSESSMENT — ENCOUNTER SYMPTOMS
RESPIRATORY NEGATIVE: 1
GASTROINTESTINAL NEGATIVE: 1

## 2021-03-15 ASSESSMENT — PAIN DESCRIPTION - DESCRIPTORS: DESCRIPTORS: ACHING

## 2021-03-15 ASSESSMENT — PAIN DESCRIPTION - FREQUENCY: FREQUENCY: INTERMITTENT

## 2021-03-15 NOTE — PROGRESS NOTES
Pt arrived to room 10 at 1240. Pt able to ambulate to bed from ED cot with minimal assistance. Pt reports she fell overnight but is unsure of exact details and gets confused when reporting her history. Pt is oriented to place and year. Pt's son, Jaycob Hernandez, notified by phone at [phone] of pt's admission. Skin assessment completed with Priyanka RN, no skin issues noted, dry skin to feet. Fall cloth and yellow fall socks in place. Call light in reach and bed alarm on.

## 2021-03-15 NOTE — H&P
History and Physical  Araceli Causey MD    3/15/2021  Francia Garcia  1935    PCP: No primary care provider on file. ASSESSMENT AND PLAN:      1. Precordial chest pain  Possibly musculoskeletal  Reproducible on palpation and on inspiration  - tele  - ekg in am  - trend trop  - med opt: is on a ccb; continue arb + statin, started on asa  - lovenox daily  - alt: pepcid, tums, maalox  - observe to determine if further risk stratification is needed    Home medications continued for  Asthma  Cad, has a PPM  CHF: is on lasix + hctz, arb; not on a bb; is allergic to lisinopril. Is on a ccb which may change to coreg pending hospital course; last echo 12/28/20: moderate pHTN with RVSP 50 mmHg, G1DD  DM2  HTN  GERD    Cc:chest discomfort  HPI: Francia Garcia is a 81 y.o. female with a history of htn, hld, cad and who has a PPM, CHF: HFpEF without exacerbation with pHTN RVSP 50 mmHg not chronically on oxygen comes from [facility] with complaitn of chest discomfort. She states she had the pain following a fall in her closet as she was folding, she then tripped and landed on her chest. She denied loc, feeling light headed, nausea, sweating, arm pain, jaw pain. Now whenever she takes a deep breath it hurts, and she has tenderness on that side of her chest as well      PMHX:   Past Medical History:   Diagnosis Date    [facility]fibrosis of total knee [facility]plasty ([facility] [address]     bilateral    Asthma     CAD (coronary artery disease)     CHF (congestive heart failure) (HCC)     Constipation     DDD (degenerative disc disease), cervical     Diabetes mellitus ([address]     Femur fracture, right ([address]     distal femur    GERD (gastroesophageal reflux disease)     Hypertension     Venous stasis      PSHX:  has a past surgical history that includes knee surgery; Appendectomy; Pacemaker insertion; pacemaker placement (12/27/2020); and Pacemaker insertion (N/A, 12/27/2020).     Meds - list of home medications reviewed in electronic chart and confirmed  Allergies: Allergies   Allergen Reactions    Lisinopril     Pcn [Penicillins]     Sulfa Antibiotics        FAM HX: htn  Soc HX:   Social History     Socioeconomic History    Marital status:      Spouse name: None    Number of children: 3    Years of education: None    Highest education level: None   Occupational History    None   Social Needs    Financial resource strain: None    Food insecurity     Worry: None     Inability: None    Transportation needs     Medical: None     Non-medical: None   Tobacco Use    Smoking status: Never Smoker    Smokeless tobacco: Never Used   Substance and Sexual Activity    Alcohol use: Not Currently    Drug use: Never    Sexual activity: Not Currently   Lifestyle    Physical activity     Days per week: None     Minutes per session: None    Stress: None   Relationships    Social connections     Talks on phone: None     Gets together: None     Attends Che[employer] service: None     Active member of club or organization: None     Attends meetings of clubs or organizations: None     Relationship status: None    Intimate partner violence     Fear of current or ex partner: None     Emotionally abused: None     Physically abused: None     Forced sexual activity: None   Other Topics Concern    None   Social History Narrative    None       ROS: a ten point ROS with pertinent positives and negatives in hpi, otherwise negative. EXAM:  Blood pressure (!) 159/77, pulse 60, temperature 97.9 °F (36.6 °C), temperature source Oral, resp. rate 18, height 5' 9.5\" (1.765 m), weight 199 lb (90.3 kg), SpO2 92 %.   Gen:  awake, alert, cooperative, no apparent distress   EYES:  Lids and lashes normal, pupils equal, round and reactive to light, extra ocular muscles intact, sclera clear, conjunctiva normal  ENT:  Normocephalic, oral pharynx with moist mucus membranes, tonsils without erythema or exudates,  NECK:  Supple, symmetrical, trachea midline, no adenopathy,  LUNGS:  Clear to auscultate bilaterally, no rales ronchi or wheezing noted. CARDIOVASCULAR:  regular rate and rhythm, normal S1 and S2,no murmur/gallop/rub  ABDOMEN: Normal BS, Non tender, non distended, no HSM noted. MUSCULOSKELETAL:  ROM of all extremities grossly wnl; tender over left chest wall  NEUROLOGIC: AOx 3,  Cranial nerves II-XII are grossly intact. Motor is 5 out of 5 bilaterally. Sensory is intact  SKIN:  no bruising or bleeding, normal skin color, texture, turgor and no redness, warmth, or swelling        LABS in ER reviewed    Dexa Bone Density Axial Skeleton    Result Date: 3/8/2021  EXAMINATION: BONE DENSITOMETRY 3/8/2021 10:27 am TECHNIQUE: A bone density dual x-ray absorptiometry (DEXA) scan was performed of the lumbar spine, distal radius, and left hip  on a [employer] system. COMPARISON: None. HISTORY: ORDERING SYSTEM PROVIDED HISTORY: Other osteoporosis without current pathological fracture TECHNOLOGIST PROVIDED HISTORY: WITH VERTEBRAL FX ASSESSMENT FINDINGS: LUMBAR SPINE: The bone mineral density in the lumbar spine including the L1 through L4 levels is measured at 1.2 g/cm2, which corresponds to a T-score of 1.1 and a Z-score of 4.0. This is within the normal range by WHO criteria. LEFT HIP: The bone mineral density in the total hip is measured at 0.8 g/cm2 corresponding to a T-score of -1.1 and a Z-score of 1.2. This is within the osteopenia range by WHO criteria. The bone mineral density of the femoral neck is measured at 0.6 g/cm2 corresponding to a T-score of -2.2 and a Z-score of 0.3. This is within the osteopenia range by WHO criteria. FOREARM: The BMD of the middle third of the radius of the distal forearm equals 0.67 g/cm2 corresponding to a T-score of -0.3 and Z-score of 3.3. This is within the 3 normal range by WHO criteria. Osteopenia by WHO criteria.      Xr Chest Portable    Result Date: 3/15/2021  EXAMINATION: ONE XRAY VIEW OF THE CHEST 3/15/2021 9:59 am COMPARISON: 12/27/2020 HISTORY: ORDERING SYSTEM PROVIDED HISTORY: CP TECHNOLOGIST PROVIDED HISTORY: Reason for exam:->CP Reason for Exam: chest pain Acuity: Acute Type of Exam: Initial Relevant Medical/Surgical History: CHF,CAD,pacemaker FINDINGS: A left subclavian transvenous pacemaker is present. No focal airspace consolidation, pleural effusion or pneumothorax is demonstrated. The heart is enlarged. Mild pulmonary vascular congestion is present. Status post right rotator cuff repair. No acute osseous abnormality is seen. Mild pulmonary vascular congestion. Cta Pulmonary W Contrast    Result Date: 3/15/2021  EXAMINATION: CTA OF THE CHEST 3/15/2021 10:37 am TECHNIQUE: CTA of the chest was performed after the administration of intravenous contrast.  Multiplanar reformatted images are provided for review. MIP images are provided for review. Dose modulation, iterative reconstruction, and/or weight based adjustment of the mA/kV was utilized to reduce the radiation dose to as low as reasonably achievable. COMPARISON: Chest radiograph of the same date. HISTORY: ORDERING SYSTEM PROVIDED HISTORY: ? PE>>>  hypoxic / CP TECHNOLOGIST PROVIDED HISTORY: Reason for exam:->? PE>>>  hypoxic / CP Reason for Exam: chest pain Acuity: Acute Type of Exam: Initial Relevant Medical/Surgical History: CHF,CAD, pacemaker FINDINGS: Pulmonary Arteries: The main pulmonary artery is enlarged, measuring 5.9 cm, consistent with pulmonary artery hypertension. There is no pulmonary embolism. Mediastinum: The heart is enlarged. There is no pericardial effusion. There are dense calcifications of the mitral annulus and moderate coronary artery calcifications. The thoracic aorta is normal in course and caliber. No mediastinal or hilar lymphadenopathy is demonstrated. A left subclavian pacemaker is in place. Lungs/pleura: No focal airspace consolidation, pleural effusion or pneumothorax is demonstrated.   There is dependent atelectasis in the lower lobes. The lungs are somewhat heterogeneous, consistent with mild edema. There is a 5 x 5 mm nodule in the superior segment of the left lower lobe, axial image 62. Upper Abdomen: Visualized portions of the upper abdomen demonstrate a 1.8 cm left adrenal adenoma. Soft Tissues/Bones: No suspicious osteolytic or osteoblastic lesions are demonstrated. There are old posterior left rib fractures. No acute osseous abnormality is seen. 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 1. No pulmonary embolism. 2. Cardiomegaly and mild pulmonary edema. 3. Enlarged main pulmonary artery, consistent with pulmonary artery hypertension. 4. 5 mm left lower lobe nodule. No further routine imaging follow-up is indicated unless the patient is high risk, as outlined below. RECOMMENDATIONS: Fleischner Society guidelines for follow-up and management of incidentally detected pulmonary nodules: Single Solid Nodule: Nodule size less than 6 mm In a low-risk patient, no routine follow-up. In a high-risk patient, optional CT at 12 months. - Low risk patients include individuals with minimal or absent history of smoking and other known risk factors. - High risk patients include individuals with a history or smoking or known risk factors. Radiology 2017 http://pubs. rsna.org/doi/full/10.1148/radiol. 8989450445   -  Patient Active Problem List   Diagnosis    Complete heart block ([address]    Chest pain, precordial     ______________________________________________________________    Electronically signed by Mike Montoya MD on 3/15/2021 at 12:06 PM

## 2021-03-15 NOTE — CARE COORDINATION
.Chart reviewed. The patient is to return to the [facility] at discharge. The patient has a PCP but she states she is new and she does not know her name. She has insurance and states she can can drive to doctor's appointments without issues. The patient can afford and take their medications as prescribed. The patient states she uses a walker to get around and she eats in the cafeteria at the [facility]. She is not on oxygen. She has good support from her 3 sons. She states 2 of them are local. The patient denies any other needs at this time.

## 2021-03-15 NOTE — ED PROVIDER NOTES
Triage Chief Complaint:   Chest Pain (to room per [facility]. Aspirin 324 given per EMS. cv States (was at dining room table when started approx 1/2 PTA))    Knik:  Kelly Fatima is a 81 y.o. female that presents to the ED by 9 1 complaining of chest discomfort. It began midsternal after she ate breakfast.  She has no underlying history of esophageal dysfunction other than GERD this is not her typical reflux she does not report a history of CAD's but is unsure of the extent. She is currently paced at a rate of 66 no inappropriate concordance at the EKG at the bedside she did fall today but did not sustain any injury. She denies any recent injury to her lower extremities surgery or immobilization that would increase her risk of DVT or PE. No personal history of thromboembolic phenomenon. She did receive aspirin by the medics. Symptoms are currently the same 6 on a scale 0-10. No radiation no breathlessness and no diaphoresis    Past Medical History:   Diagnosis Date    Arthrofibrosis of total knee arthroplasty ([facility] [address]     bilateral    Asthma     CAD (coronary artery disease)     CHF (congestive heart failure) (HCC)     Constipation     DDD (degenerative disc disease), cervical     Diabetes mellitus ([address]     Femur fracture, right (HCC)     distal femur    GERD (gastroesophageal reflux disease)     Hypertension     Venous stasis      Past Surgical History:   Procedure Laterality Date    APPENDECTOMY      KNEE SURGERY      PACEMAKER INSERTION      AV sequential pacer    PACEMAKER INSERTION N/A 12/27/2020    PACEMAKER INSERTION PERMANENT performed by Adriana Crawley MD at [facility]  12/27/2020    AV sequential     History reviewed. No pertinent family history.   Social History     Socioeconomic History    Marital status:      Spouse name: Not on file    Number of children: 3    Years of education: Not on file    Highest education level: Not on file   Occupational scleral icterus. Right eye: No discharge. Left eye: No discharge. Conjunctiva/sclera: Conjunctivae normal.      Pupils: Pupils are equal, round, and reactive to light. Neck:      Musculoskeletal: Normal range of motion and neck supple. Thyroid: No thyromegaly. Vascular: No JVD. Trachea: No tracheal deviation. Cardiovascular:      Rate and Rhythm: Normal rate and regular rhythm. Heart sounds: Normal heart sounds. No murmur. No friction rub. No gallop. Pulmonary:      Effort: Pulmonary effort is normal. No respiratory distress. Breath sounds: Normal breath sounds. No stridor. No wheezing or rales. Chest:      Chest wall: No tenderness. Abdominal:      General: Abdomen is flat. Bowel sounds are normal. There is no distension. Palpations: Abdomen is soft. There is no mass. Tenderness: There is no abdominal tenderness. There is no guarding or rebound. Hernia: No hernia is present. Musculoskeletal: Normal range of motion. General: No tenderness or deformity. Lymphadenopathy:      Cervical: No cervical adenopathy. Skin:     General: Skin is warm and dry. Capillary Refill: Capillary refill takes less than 2 seconds. Coloration: Skin is not pale. Findings: No erythema or rash. Neurological:      General: No focal deficit present. Mental Status: She is alert and oriented to person, place, and time. Cranial Nerves: No cranial nerve deficit. Sensory: No sensory deficit. Deep Tendon Reflexes: Reflexes are normal and symmetric. Reflexes normal.   Psychiatric:         Speech: Speech normal.         Behavior: Behavior normal.         Thought Content:  Thought content normal.         Judgment: Judgment normal.         I have reviewed and interpreted all of the currently available lab results from this visit (ifapplicable):  Results for orders placed or performed during the hospital encounter of 03/15/21   CBC Auto Differential   Result Value Ref Range    WBC 7.6 4.0 - 10.5 K/CU MM    RBC 3.79 (L) 4.2 - 5.4 M/CU MM    Hemoglobin 11.1 (L) 12.5 - 16.0 GM/DL    Hematocrit 36.0 (L) 37 - 47 %    MCV 95.0 78 - 100 FL    MCH 29.3 27 - 31 PG    MCHC 30.8 (L) 32.0 - 36.0 %    RDW 13.9 11.7 - 14.9 %    Platelets 669 731 - 133 K/CU MM    MPV 9.1 7.5 - 11.1 FL    Differential Type AUTOMATED DIFFERENTIAL     Segs Relative 61.6 36 - 66 %    Lymphocytes % 23.6 (L) 24 - 44 %    Monocytes % 10.3 (H) 0 - 4 %    Eosinophils % 3.2 (H) 0 - 3 %    Basophils % 0.9 0 - 1 %    Segs Absolute 4.7 K/CU MM    Lymphocytes Absolute 1.8 K/CU MM    Monocytes Absolute 0.8 K/CU MM    Eosinophils Absolute 0.2 K/CU MM    Basophils Absolute 0.1 K/CU MM    Immature Neutrophil % 0.4 0 - 0.43 %    Total Immature Neutrophil 0.03 K/CU MM   Basic Metabolic Panel w/ Reflex to MG   Result Value Ref Range    Sodium 136 135 - 145 MMOL/L    Potassium 3.6 3.5 - 5.1 MMOL/L    Chloride 97 (L) 99 - 110 mMol/L    CO2 34 (H) 21 - 32 MMOL/L    Anion Gap 5 4 - 16    BUN 12 6 - 23 MG/DL    CREATININE 0.8 0.6 - 1.1 MG/DL    Glucose 129 (H) 70 - 99 MG/DL    Calcium 10.9 (H) 8.3 - 10.6 MG/DL    GFR Non-African American >60 >60 mL/min/1.73m2    GFR African American >60 >60 mL/min/1.73m2   Troponin   Result Value Ref Range    Troponin T <0.010 <0.01 NG/ML   D-dimer, Quantitative   Result Value Ref Range    D-Dimer, Quant 768 (H) <230 NG/mL(DDU)   EKG 12 Lead   Result Value Ref Range    Ventricular Rate 66 BPM    Atrial Rate 66 BPM    P-R Interval 216 ms    QRS Duration 154 ms    Q-T Interval 452 ms    QTc Calculation (Bazett) 473 ms    P Axis 41 degrees    R Axis -67 degrees    T Axis 71 degrees    Diagnosis       Atrial-sensed ventricular-paced rhythm with prolonged AV conduction  Abnormal ECG  When compared with ECG of 26-DEC-2020 10:28,  AV dual-paced complexes replaced complete heart block  Confirmed by Zelda Brian MD, [facility] ([id number]) on 3/15/2021 10:20:00 AM        Radiographs (if obtained):  [] The following radiograph wasinterpreted by myself in the absence of a radiologist:   [] Radiologist's Report Reviewed:  CTA PULMONARY W CONTRAST   Preliminary Result   1. No pulmonary embolism. 2. Cardiomegaly and mild pulmonary edema. 3. Enlarged main pulmonary artery, consistent with pulmonary artery   hypertension. 4. 5 mm left lower lobe nodule. No further routine imaging follow-up is   indicated unless the patient is high risk, as outlined below. RECOMMENDATIONS:   Fleischner Society guidelines for follow-up and management of incidentally   detected pulmonary nodules:      Single Solid Nodule:      Nodule size less than 6 mm   In a low-risk patient, no routine follow-up. In a high-risk patient, optional CT at 12 months. - Low risk patients include individuals with minimal or absent history of   smoking and other known risk factors. - High risk patients include individuals with a history or smoking or known   risk factors. Radiology 2017 http://pubs. rsna.org/doi/full/10.1148/radiol. 1394380577         XR CHEST PORTABLE   Final Result   Mild pulmonary vascular congestion. EKG (if obtained): (All EKG's are interpreted by myself in the absence of a cardiologist)      The 12 lead EKG was interpreted by me, and the interpretation is as follows:  ventricular fibrillation and PACED, rate = 66. Intervals are within the normal range. QTc is not prolonged. ST elevations are not present. T wave inversions are not present. Non-specific T wave changes are not present. Delta waves, Brugada Syndrome, and Short NE are not present. There is no acute ischemia. Chart review shows recent radiographs:  Dexa Bone Density Axial Skeleton    Result Date: 3/8/2021  EXAMINATION: BONE DENSITOMETRY 3/8/2021 10:27 am TECHNIQUE: A bone density dual x-ray absorptiometry (DEXA) scan was performed of the lumbar spine, distal radius, and left hip  on a [employer] system. COMPARISON: None. HISTORY: ORDERING SYSTEM PROVIDED HISTORY: Other osteoporosis without current pathological fracture TECHNOLOGIST PROVIDED HISTORY: WITH VERTEBRAL FX ASSESSMENT FINDINGS: LUMBAR SPINE: The bone mineral density in the lumbar spine including the L1 through L4 levels is measured at 1.2 g/cm2, which corresponds to a T-score of 1.1 and a Z-score of 4.0. This is within the normal range by WHO criteria. LEFT HIP: The bone mineral density in the total hip is measured at 0.8 g/cm2 corresponding to a T-score of -1.1 and a Z-score of 1.2. This is within the osteopenia range by WHO criteria. The bone mineral density of the femoral neck is measured at 0.6 g/cm2 corresponding to a T-score of -2.2 and a Z-score of 0.3. This is within the osteopenia range by WHO criteria. FOREARM: The BMD of the middle third of the radius of the distal forearm equals 0.67 g/cm2 corresponding to a T-score of -0.3 and Z-score of 3.3. This is within the 3 normal range by WHO criteria. Osteopenia by WHO criteria. MDM:      At this time, patient will be admitted for ACS rule out. I do not see evidence of pneumothorax on CXR nor pneumonia. History does not support aortic dissection. Doubt pulmonary embolism. Doubt pericarditis or pericardial effusion. Doubt esophageal rupture. No traumatic history. At this time, do not believe the patient meets indication for emergent PCI. Patient is stable for observation.         HEART Score for Major Cardiac Event    History - 2  (Highly suspicious +2, Moderate +1, Slightly 0)    EKG - 0  (ST depression +2, Non-specific changes +1, Normal 0)    Age - 2  (>/= 65 +2, 45-65 +1, <45 0)    Risk Factors - 2  (>/= 3 +2, 1-2 +1, None 0)  Including:  *Hyperlipidemia  *Hypertension  *Diabetes  *Cigarette  *Family history  *Obesity    Troponin - 0  (>/= 3x normal +2, 1-3x normal +1, Normal 0)    TOTAL - 6    The HEART Score is a propspectively studied scoring system to help emergency departments risk-stratifiy chest pain patients: who will have a MACE within in the next 6 weeks and who will not? It involves only a 1-time troponin, at admission. The rest of the score is based on age, history, risk factors, and EKG. Low risk patients have a score 0-3 and have a less than 2% risk of MACE at 6 weeks. All other scores are high risk (risk increasing exponentially) and require further management and admission. While HEART is sometimes compared to KHOI and PRESTON (older ACS scores), these measure risk of death for patients with ACS, and do not do as well telling who has ACS in the first place. Clinical Impression:  1. Chest pain, unspecified type      Disposition referral (if applicable):  No follow-up provider specified. Disposition medications (if applicable):  New Prescriptions    No medications on file           Gaudencio Albarran, , FACEP      Comment: Please note this report has been produced using speech recognition software and maycontain errors related to that system including errors in grammar, punctuation, and spelling, as well as words and phrases that may be inappropriate. If there are any questions or concerns please feel free to contact thedictating provider for clarification.         Jojo Beckwith, DO  03/15/21 [address] [place] [address], DO  03/15/21 [facility]

## 2021-03-15 NOTE — ED NOTES
Placed on oxygen per NC at 2 lpm due to oxygen levels fluctuating between 87-92%.       Jes Mcclure RN  03/15/21 1435

## 2021-03-15 NOTE — ED NOTES
Pt states pain increases with deep breathing. Dr. Colletta Ruddle notified.       Joanna Penn RN  03/15/21 9381

## 2021-03-16 VITALS
SYSTOLIC BLOOD PRESSURE: 146 MMHG | BODY MASS INDEX: 26.97 KG/M2 | HEART RATE: 60 BPM | HEIGHT: 70 IN | OXYGEN SATURATION: 90 % | RESPIRATION RATE: 18 BRPM | WEIGHT: 188.4 LBS | TEMPERATURE: 96.8 F | DIASTOLIC BLOOD PRESSURE: 69 MMHG

## 2021-03-16 PROBLEM — R07.2 CHEST PAIN, PRECORDIAL: Status: RESOLVED | Noted: 2021-03-15 | Resolved: 2021-03-16

## 2021-03-16 PROBLEM — I44.2 COMPLETE HEART BLOCK (HCC): Status: RESOLVED | Noted: 2020-12-25 | Resolved: 2021-03-16

## 2021-03-16 LAB
EKG ATRIAL RATE: 62 BPM
EKG DIAGNOSIS: NORMAL
EKG P AXIS: 82 DEGREES
EKG P-R INTERVAL: 228 MS
EKG Q-T INTERVAL: 472 MS
EKG QRS DURATION: 150 MS
EKG QTC CALCULATION (BAZETT): 479 MS
EKG R AXIS: -61 DEGREES
EKG T AXIS: 77 DEGREES
EKG VENTRICULAR RATE: 62 BPM
HCT VFR BLD CALC: 36.7 % (ref 37–47)
HEMOGLOBIN: 10.9 GM/DL (ref 12.5–16)
MCH RBC QN AUTO: 29.1 PG (ref 27–31)
MCHC RBC AUTO-ENTMCNC: 29.7 % (ref 32–36)
MCV RBC AUTO: 97.9 FL (ref 78–100)
PDW BLD-RTO: 14 % (ref 11.7–14.9)
PLATELET # BLD: 210 K/CU MM (ref 140–440)
PMV BLD AUTO: 10 FL (ref 7.5–11.1)
RBC # BLD: 3.75 M/CU MM (ref 4.2–5.4)
TROPONIN T: <0.01 NG/ML
WBC # BLD: 7.6 K/CU MM (ref 4–10.5)

## 2021-03-16 PROCEDURE — 2580000003 HC RX 258: Performed by: INTERNAL MEDICINE

## 2021-03-16 PROCEDURE — 2500000003 HC RX 250 WO HCPCS: Performed by: INTERNAL MEDICINE

## 2021-03-16 PROCEDURE — 84484 ASSAY OF TROPONIN QUANT: CPT

## 2021-03-16 PROCEDURE — 96372 THER/PROPH/DIAG INJ SC/IM: CPT

## 2021-03-16 PROCEDURE — 96376 TX/PRO/DX INJ SAME DRUG ADON: CPT

## 2021-03-16 PROCEDURE — G0378 HOSPITAL OBSERVATION PER HR: HCPCS

## 2021-03-16 PROCEDURE — 93005 ELECTROCARDIOGRAM TRACING: CPT | Performed by: INTERNAL MEDICINE

## 2021-03-16 PROCEDURE — 93010 ELECTROCARDIOGRAM REPORT: CPT | Performed by: INTERNAL MEDICINE

## 2021-03-16 PROCEDURE — 36415 COLL VENOUS BLD VENIPUNCTURE: CPT

## 2021-03-16 PROCEDURE — 6360000002 HC RX W HCPCS: Performed by: INTERNAL MEDICINE

## 2021-03-16 PROCEDURE — 6370000000 HC RX 637 (ALT 250 FOR IP): Performed by: INTERNAL MEDICINE

## 2021-03-16 PROCEDURE — 94640 AIRWAY INHALATION TREATMENT: CPT

## 2021-03-16 PROCEDURE — 85027 COMPLETE CBC AUTOMATED: CPT

## 2021-03-16 RX ORDER — HYDROCHLOROTHIAZIDE 12.5 MG/1
12.5 CAPSULE, GELATIN COATED ORAL DAILY
Qty: 30 CAPSULE | Refills: 3 | Status: SHIPPED | OUTPATIENT
Start: 2021-03-17 | End: 2022-04-22

## 2021-03-16 RX ADMIN — PANTOPRAZOLE SODIUM 20 MG: 20 TABLET, DELAYED RELEASE ORAL at 08:00

## 2021-03-16 RX ADMIN — FUROSEMIDE 20 MG: 20 TABLET ORAL at 08:00

## 2021-03-16 RX ADMIN — AMLODIPINE BESYLATE 5 MG: 5 TABLET ORAL at 08:01

## 2021-03-16 RX ADMIN — ASPIRIN 81 MG: 81 TABLET, FILM COATED ORAL at 08:00

## 2021-03-16 RX ADMIN — HYDROCHLOROTHIAZIDE 12.5 MG: 12.5 CAPSULE ORAL at 08:00

## 2021-03-16 RX ADMIN — SODIUM CHLORIDE, PRESERVATIVE FREE 10 ML: 5 INJECTION INTRAVENOUS at 08:01

## 2021-03-16 RX ADMIN — FAMOTIDINE 20 MG: 10 INJECTION, SOLUTION INTRAVENOUS at 08:01

## 2021-03-16 RX ADMIN — ENOXAPARIN SODIUM 40 MG: 40 INJECTION SUBCUTANEOUS at 08:01

## 2021-03-16 RX ADMIN — LOSARTAN POTASSIUM 100 MG: 100 TABLET, FILM COATED ORAL at 08:00

## 2021-03-16 RX ADMIN — BUDESONIDE AND FORMOTEROL FUMARATE DIHYDRATE 2 PUFF: 160; 4.5 AEROSOL RESPIRATORY (INHALATION) at 07:48

## 2021-03-16 RX ADMIN — FERROUS SULFATE TAB 325 MG (65 MG ELEMENTAL FE) 325 MG: 325 (65 FE) TAB at 08:00

## 2021-03-16 RX ADMIN — SERTRALINE 25 MG: 25 TABLET, FILM COATED ORAL at 08:01

## 2021-03-16 RX ADMIN — ACETAMINOPHEN 1000 MG: 500 TABLET ORAL at 06:11

## 2021-03-16 RX ADMIN — Medication 1000 UNITS: at 08:00

## 2021-03-16 ASSESSMENT — PAIN DESCRIPTION - PAIN TYPE: TYPE: ACUTE PAIN

## 2021-03-16 ASSESSMENT — PAIN DESCRIPTION - ONSET: ONSET: GRADUAL

## 2021-03-16 ASSESSMENT — PAIN SCALES - GENERAL: PAINLEVEL_OUTOF10: 2

## 2021-03-16 ASSESSMENT — PAIN DESCRIPTION - DESCRIPTORS: DESCRIPTORS: ACHING

## 2021-03-16 NOTE — PLAN OF CARE
Problem: Falls - Risk of:  Goal: Will remain free from falls  Description: Will remain free from falls  3/16/2021 0742 by Ida Lopez RN  Outcome: Ongoing  3/16/2021 0147 by Johnell Gilford, RN  Outcome: Ongoing  Goal: Absence of physical injury  Description: Absence of physical injury  3/16/2021 0742 by Ida Lopez RN  Outcome: Ongoing  3/16/2021 0147 by Johnell Gilford, RN  Outcome: Ongoing

## 2021-03-16 NOTE — PLAN OF CARE
Problem: Falls - Risk of:  Goal: Will remain free from falls  Description: Will remain free from falls  3/16/2021 1316 by Grace Barton RN  Outcome: Completed  3/16/2021 0742 by Grace Barton RN  Outcome: Ongoing  3/16/2021 0147 by Collins Blanca RN  Outcome: Ongoing  Goal: Absence of physical injury  Description: Absence of physical injury  3/16/2021 1316 by Grace Barton RN  Outcome: Completed  3/16/2021 [address] by Grace Barton RN  Outcome: Ongoing  3/16/2021 0147 by Collins Blanca RN  Outcome: Ongoing

## 2021-03-16 NOTE — PROGRESS NOTES
Follow-up appt made with Dr. Omid Waddell (cardiologist) for April 1st at 9:30 AM, pt's son called and notified of appt and also of pt's discharge.

## 2021-03-16 NOTE — PROGRESS NOTES
Pt discharged at 1430 with CTS, report called to ADALGISA at [facility]. Peripheral IV removed. Nurse at [facility] aware of new prescription.

## 2021-03-16 NOTE — DISCHARGE SUMMARY
Discharge Summary    Name:  Leah Goff /Age/Sex: 1935  (81 y.o. female)   MRN & CSN:  [id number] & [id number] Admission Date/Time: 3/15/2021  9:15 AM   Attending:  Adriana Klein MD Discharging Physician: Rubio Mcmahan MD     HPI:   81-year-old white female presented from [facility] with chest pain. Past medical history significant for third-degree block. Recent pacemaker placed in 2020. Also with a history of pulmonary hypertension grade 1 diastolic dysfunction CHF. Stated she had pain following recent fall in her closet landing on her chest.  Denied loss of consciousness or feeling lightheaded or nausea. Denied sweating. Denied any radiating symptoms for her chest pressure. Stating concerns of worsening pain with deep inspiration. Please see H&P for full details  Hospital Course:     1. Chest pain: Reproducible left lateral not precordial. Troponins less than 0.010x3. Paced rhythm with no acute ST-T wave abnormalities. Chest pain-free at this time. Currently on ARB, statin and newly initiated aspirin on admission. Pacemaker placed by CTS. No cardiology notes noted per record review. However, patient receives most care through [facility]. Patient will follow up with cardiology prior to discharge    2. History of third-degree heart block: Status post pacemaker placement 2020. Follow-up with CTS for same. 3.  CHF grade 1 DD: Echo resulted below. Did discuss fluid restriction. Patient currently on Lasix and hydrochlorothiazide. Follow-up with cardiology as discussed    4. Hypertension: 146/69 this a.m. prior to medications otherwise controlled. Follow-up with primary continued losartan 100 mg daily, hydrochlorothiazide 12.5 mg daily and amlodipine 5 mg daily. 5.  Non-insulin-dependent diabetes: Currently not medicated serum glucose this a.m. 129.   Patient to follow-up with primary      The patient expressed appropriate understanding of and agreement with the discharge recommendations, medications, and plan. Consults this admission:  None    Discharge Instruction:   Follow up appointments: Cardiology scheduled prior to discharge  Primary care physician:  within 2 weeks    Diet:  cardiac diet   Activity: activity as tolerated  Disposition: Discharged to:   [x]Home, []C, []SNF, []Acute Rehab, []Hospice   Condition on discharge: Stable    Discharge Medications:      [place] [facility]   Home Medication Instructions DCD:[id number]    Printed on:03/16/21 1240   Medication Information                      acetaminophen (TYLENOL) 500 MG tablet  Take 2 tablets by mouth every 8 hours for 7 days After January 5, 2021, change this medication to prn             albuterol sulfate HFA (VENTOLIN HFA) 108 (90 Base) MCG/ACT inhaler  Inhale 2 puffs into the lungs every 6 hours as needed for Wheezing             amLODIPine (NORVASC) 5 MG tablet  Take 5 mg by mouth daily             ammonium lactate (AMLACTIN) 12 % cream  Apply topically as needed for Dry Skin Apply topically as needed.              aspirin 81 MG chewable tablet  Take 81 mg by mouth daily             atorvastatin (LIPITOR) 10 MG tablet  Take 10 mg by mouth daily             budesonide-formoterol (SYMBICORT) 160-4.5 MCG/ACT AERO  Inhale 2 puffs into the lungs 2 times daily             ferrous sulfate (IRON 325) 325 (65 Fe) MG tablet  Take 1 tablet by mouth every other day             furosemide (LASIX) 20 MG tablet  Take 1 tablet by mouth daily             hydroCHLOROthiazide (MICROZIDE) 12.5 MG capsule  Take 1 capsule by mouth daily             ibuprofen (ADVIL;MOTRIN) 600 MG tablet  Take 600 mg by mouth every 8 hours as needed for Pain             losartan (COZAAR) 100 MG tablet  Take 100 mg by mouth daily             pantoprazole (PROTONIX) 20 MG tablet  Take 20 mg by mouth daily             sennosides-docusate sodium (SENOKOT-S) 8.6-50 MG tablet  Take 2 tablets by mouth daily as needed for Constipation             sertraline (ZOLOFT) 25 MG tablet  Take 1 tablet by mouth nightly              vitamin D (CHOLECALCIFEROL) 25 MCG (1000 UT) TABS tablet  Take 1,000 Units by mouth daily                 Objective Findings at Discharge:   BP (!) 146/69   Pulse 60   Temp 96.8 °F (36 °C) (Infrared)   Resp 18   Ht 5' 9.5\" (1.765 m)   Wt 188 lb 6.4 oz (85.5 kg)   SpO2 90%   BMI 27.42 kg/m²            PHYSICAL EXAM   GEN Awake female, sitting upright in bed in no apparent distress. Appears given age. EYES Pupils are equally round. No scleral erythema, discharge, or conjunctivitis. HENT Mucous membranes are moist.   NECK No apparent thyromegaly or masses. RESP Clear to auscultation, no wheezes, rales or rhonchi. Symmetric chest movement while on room air. CARDIO/VASC S1/S2 auscultated. Regular paced rate without appreciable murmurs, rubs, or gallops. Peripheral pulses equal bilaterally and palpable. No peripheral edema. GI Abdomen is soft without significant tenderness, masses, or guarding. Bowel sounds are normoactive. Rectal exam deferred.  Tello catheter is not present. HEME/LYMPH No petechiae or ecchymoses. MSK No gross joint deformities. Spontaneous movement of all extremities  SKIN Normal coloration, warm, dry. Pacemaker insertion site well-healed  NEURO Cranial nerves appear grossly intact, normal speech, no lateralizing weakness. PSYCH Awake, alert, oriented x 4. Affect appropriate. BMP/CBC  Recent Labs     03/15/21  0954 03/16/21  0550     --    K 3.6  --    CL 97*  --    CO2 34*  --    BUN 12  --    CREATININE 0.8  --    WBC 7.6 7.6   HCT 36.0* 36.7*    210       IMAGING: Pulmonary CTA with contrast on intake:  Impression   1. No pulmonary embolism. 2. Cardiomegaly and mild pulmonary edema. 3. Enlarged main pulmonary artery, consistent with pulmonary artery   hypertension.    4. 5 mm left lower lobe nodule.  No further routine imaging follow-up is   indicated unless the patient is high risk, as outlined below.       RECOMMENDATIONS:   Fleischner Society guidelines for follow-up and management of incidentally   detected pulmonary nodules:       Single Solid Nodule:       Nodule size less than 6 mm   In a low-risk patient, no routine follow-up. In a high-risk patient, optional CT at 12 months.         Chest x-ray on intake:  FINDINGS:   A left subclavian transvenous pacemaker is present.  No focal airspace   consolidation, pleural effusion or pneumothorax is demonstrated.  The heart   is enlarged.  Mild pulmonary vascular congestion is present.  Status post   right rotator cuff repair.  No acute osseous abnormality is seen.           Impression   Mild pulmonary vascular congestion.         Echo performed on 2020:Summary   Left ventricular systolic function is normal.   Ejection fraction is visually estimated at 50-55%. Mild left ventricular hypertrophy. Grade I diastolic dysfunction. PPM wiring visualized in right heart. Large mitral annular calcification present with mild mitral stenosis; mean   PmmHg. Mild mitral regurgitation. Moderate tricuspid regurgitation; RVSP is 50mmHg. Moderate PHTN present. No evidence of any pericardial effusion.          Discharge Time of 32 minutes    Electronically signed by Rubio Mcmahan MD on 3/16/2021 at 12:45 PM

## 2021-03-19 RX ORDER — FUROSEMIDE 20 MG/1
20 TABLET ORAL DAILY
Qty: 90 TABLET | Refills: 1 | Status: ON HOLD | OUTPATIENT
Start: 2021-03-19 | End: 2022-04-23 | Stop reason: HOSPADM

## 2021-03-19 NOTE — TELEPHONE ENCOUNTER
Per hospital discharge summary, patient is still taking furosemide 20 mg qd. Phoned Rx to [facility], [phone]:  Furosemide 20 mg qd #90 Rx1 (pended and routed to [facility] for approval).

## 2021-03-30 ENCOUNTER — APPOINTMENT (OUTPATIENT)
Dept: CT IMAGING | Age: 86
End: 2021-03-30
Payer: MEDICARE

## 2021-03-30 ENCOUNTER — HOSPITAL ENCOUNTER (EMERGENCY)
Age: 86
Discharge: HOME | End: 2021-03-31
Attending: EMERGENCY MEDICINE
Payer: MEDICARE

## 2021-03-30 VITALS
SYSTOLIC BLOOD PRESSURE: 164 MMHG | HEART RATE: 73 BPM | RESPIRATION RATE: 16 BRPM | TEMPERATURE: 97.9 F | HEIGHT: 69 IN | OXYGEN SATURATION: 96 % | WEIGHT: 199 LBS | DIASTOLIC BLOOD PRESSURE: 85 MMHG | BODY MASS INDEX: 29.47 KG/M2
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
## 2021-03-30 DIAGNOSIS — S01.01XA LACERATION OF SCALP, INITIAL ENCOUNTER: ICD-10-CM

## 2021-03-30 DIAGNOSIS — S09.90XA CLOSED HEAD INJURY, INITIAL ENCOUNTER: Primary | ICD-10-CM

## 2021-03-30 PROCEDURE — 6370000000 HC RX 637 (ALT 250 FOR IP): Performed by: EMERGENCY MEDICINE

## 2021-03-30 PROCEDURE — 72125 CT NECK SPINE W/O DYE: CPT

## 2021-03-30 PROCEDURE — 99285 EMERGENCY DEPT VISIT HI MDM: CPT

## 2021-03-30 PROCEDURE — 12002 RPR S/N/AX/GEN/TRNK2.6-7.5CM: CPT

## 2021-03-30 PROCEDURE — 70450 CT HEAD/BRAIN W/O DYE: CPT

## 2021-03-30 RX ADMIN — Medication 3 ML: at 22:50

## 2021-03-31 ASSESSMENT — ENCOUNTER SYMPTOMS
FACIAL SWELLING: 0
RESPIRATORY NEGATIVE: 1
SHORTNESS OF BREATH: 0
COUGH: 0
CHEST TIGHTNESS: 0
NAUSEA: 0
DIARRHEA: 0
EYE PAIN: 0
ABDOMINAL PAIN: 0
CONSTIPATION: 0
VOMITING: 0
WHEEZING: 0
GASTROINTESTINAL NEGATIVE: 1
EYES NEGATIVE: 1
BACK PAIN: 0

## 2021-03-31 NOTE — ED NOTES
Laceration to posterior head, cleaned with soap and water patient tolerated well.       Roddy Reyna, RN  03/31/21 0104

## 2021-03-31 NOTE — ED PROVIDER NOTES
Alison [address]      Pt Name: Alvin Horn  MRN: [id number]  [place] 1935  Date of evaluation: 3/30/2021  Provider: Hiwot Pitts, [address] [place] [address]       Chief Complaint   Patient presents with    Laceration     back of the head  no loss of     Fall         HISTORY OF PRESENT ILLNESS      Alvin Horn is a 81 y.o. female who presents to the emergency department  for   Chief Complaint   Patient presents with    Laceration     back of the head  no loss of     Fall       81-year-old female presents emergency department status post mechanical fall. Patient reports that she was walking and backed over an item that tripped her. Patient hit the back of her head. Patient reports laceration. Patient denies loss of consciousness and denies anticoagulation. Patient reports no neurologic deficits at this time. Patient reports no pain and no other associated symptoms. The history is provided by the patient, medical records and the EMS personnel. No  was used. Nursing Notes, Triage Notes & Vital Signs were reviewed. REVIEW OF SYSTEMS    (2-9 systems for level 4, 10 or more for level 5)     Review of Systems   Constitutional: Negative. Negative for chills, fatigue and fever. HENT: Negative. Negative for congestion, dental problem, facial swelling and nosebleeds. Eyes: Negative. Negative for pain and visual disturbance. Respiratory: Negative. Negative for cough, chest tightness, shortness of breath and wheezing. Cardiovascular: Negative. Negative for chest pain and palpitations. Gastrointestinal: Negative. Negative for abdominal pain, constipation, diarrhea, nausea and vomiting. Genitourinary: Negative. Negative for dysuria, flank pain, frequency, hematuria and urgency. Musculoskeletal: Positive for arthralgias.  Negative for back pain, gait problem, myalgias, neck pain and neck stiffness. Skin: Positive for wound. Negative for rash. Neurological: Negative. Negative for dizziness, speech difficulty, light-headedness, numbness and headaches. Psychiatric/Behavioral: Negative. Negative for agitation and confusion. The patient is not nervous/anxious. All other systems reviewed and are negative. Except as noted above the remainder of the review of systems was reviewed and negative. PAST MEDICAL HISTORY     Past Medical History:   Diagnosis Date    Arthrofibrosis of total knee arthroplasty ([facility]     bilateral    Asthma     CAD (coronary artery disease)     CHF (congestive heart failure) ([facility])     Constipation     DDD (degenerative disc disease), cervical     [facility]     Femur fracture, right ([facility])     distal femur    GERD (gastroesophageal reflux disease)     Hypertension     Venous stasis        Prior to Admission medications    Medication Sig Start Date End Date Taking? Authorizing Provider   furosemide (LASIX) 20 MG tablet Take 1 tablet by mouth daily 3/19/21   CESAR Hyman - FRANCIS   hydroCHLOROthiazide (MICROZIDE) 12.5 MG capsule Take 1 capsule by mouth daily 3/17/21   Ghulam Blancas MD   ibuprofen (ADVIL;MOTRIN) 600 MG tablet Take 600 mg by mouth every 8 hours as needed for Pain    Historical Provider, MD   ammonium lactate (AMLACTIN) 12 % cream Apply topically as needed for Dry Skin Apply topically as needed.     Historical Provider, MD   amLODIPine (NORVASC) 5 MG tablet Take 5 mg by mouth daily    Historical Provider, MD   sertraline (ZOLOFT) 25 MG tablet Take 1 tablet by mouth nightly  2/6/21   Historical Provider, MD   ferrous sulfate (IRON 325) 325 (65 Fe) MG tablet Take 1 tablet by mouth every other day 12/29/20   Catrina Rausch MD   sennosides-docusate sodium (SENOKOT-S) 8.6-50 MG tablet Take 2 tablets by mouth daily as needed for Constipation  Patient taking differently: Take 2 tablets by mouth every evening  12/29/20 Jose L Huston MD   acetaminophen (TYLENOL) 500 MG tablet Take 2 tablets by mouth every 8 hours for 7 days After January 5, 2021, change this medication to prn 12/29/20 1/5/21  Jose L Huston MD   aspirin 81 MG chewable tablet Take 81 mg by mouth daily    Historical Provider, MD   atorvastatin (LIPITOR) 10 MG tablet Take 10 mg by mouth daily    Historical Provider, MD   budesonide-formoterol (SYMBICORT) 160-4.5 MCG/ACT AERO Inhale 2 puffs into the lungs 2 times daily    Historical Provider, MD   losartan (COZAAR) 100 MG tablet Take 100 mg by mouth daily    Historical Provider, MD   pantoprazole (PROTONIX) 20 MG tablet Take 20 mg by mouth daily    Historical Provider, MD   albuterol sulfate HFA (VENTOLIN HFA) 108 (90 Base) MCG/ACT inhaler Inhale 2 puffs into the lungs every 6 hours as needed for Wheezing    Historical Provider, MD   vitamin D (CHOLECALCIFEROL) 25 MCG (1000 UT) TABS tablet Take 1,000 Units by mouth daily    Historical Provider, MD        Patient Active Problem List   Diagnosis   (none) - all problems resolved or deleted         SURGICAL HISTORY       Past Surgical History:   Procedure Laterality Date    APPENDECTOMY      KNEE SURGERY      PACEMAKER INSERTION      AV sequential pacer    PACEMAKER INSERTION N/A 12/27/2020    PACEMAKER INSERTION PERMANENT performed by Poncho Good MD at [facility]  12/27/2020    AV sequential         CURRENT MEDICATIONS       Previous Medications    ACETAMINOPHEN (TYLENOL) 500 MG TABLET    Take 2 tablets by mouth every 8 hours for 7 days After January 5, 2021, change this medication to prn    ALBUTEROL SULFATE HFA (VENTOLIN HFA) 108 (90 BASE) MCG/ACT INHALER    Inhale 2 puffs into the lungs every 6 hours as needed for Wheezing    AMLODIPINE (NORVASC) 5 MG TABLET    Take 5 mg by mouth daily    AMMONIUM LACTATE (AMLACTIN) 12 % CREAM    Apply topically as needed for Dry Skin Apply topically as needed.     ASPIRIN 81 MG CHEWABLE TABLET    Take 81 mg by mouth daily    ATORVASTATIN (LIPITOR) 10 MG TABLET    Take 10 mg by mouth daily    BUDESONIDE-FORMOTEROL (SYMBICORT) 160-4.5 MCG/ACT AERO    Inhale 2 puffs into the lungs 2 times daily    FERROUS SULFATE (IRON 325) 325 (65 FE) MG TABLET    Take 1 tablet by mouth every other day    FUROSEMIDE (LASIX) 20 MG TABLET    Take 1 tablet by mouth daily    HYDROCHLOROTHIAZIDE (MICROZIDE) 12.5 MG CAPSULE    Take 1 capsule by mouth daily    IBUPROFEN (ADVIL;MOTRIN) 600 MG TABLET    Take 600 mg by mouth every 8 hours as needed for Pain    LOSARTAN (COZAAR) 100 MG TABLET    Take 100 mg by mouth daily    PANTOPRAZOLE (PROTONIX) 20 MG TABLET    Take 20 mg by mouth daily    SENNOSIDES-DOCUSATE SODIUM (SENOKOT-S) 8.6-50 MG TABLET    Take 2 tablets by mouth daily as needed for Constipation    SERTRALINE (ZOLOFT) 25 MG TABLET    Take 1 tablet by mouth nightly     VITAMIN D (CHOLECALCIFEROL) 25 MCG (1000 UT) TABS TABLET    Take 1,000 Units by mouth daily       ALLERGIES     Lisinopril, Pcn [penicillins], and Sulfa antibiotics    FAMILY HISTORY     No family history on file.        SOCIAL HISTORY       Social History     Socioeconomic History    Marital status:      Spouse name: Not on file    Number of children: 3    Years of education: Not on file    Highest education level: Not on file   Occupational History    Not on file   Social Needs    Financial resource strain: Not on file    Food insecurity     Worry: Not on file     Inability: Not on file   [employer] needs     Medical: Not on file     Non-medical: Not on file   Tobacco Use    Smoking status: Never Smoker    Smokeless tobacco: Never Used   Substance and Sexual Activity    Alcohol use: Not Currently    Drug use: Never    Sexual activity: Not Currently   Lifestyle    Physical activity     Days per week: Not on file     Minutes per session: Not on file    Stress: Not on file   Relationships    Social connections     Talks on phone: Not on file     Gets together: Not on file     Attends [employer] service: Not on file     Active member of club or organization: Not on file     Attends meetings of clubs or organizations: Not on file     Relationship status: Not on file    Intimate partner violence     Fear of current or ex partner: Not on file     Emotionally abused: Not on file     Physically abused: Not on file     Forced sexual activity: Not on file   Other Topics Concern    Not on file   Social History Narrative    Not on file       SCREENINGS               PHYSICAL EXAM    (up to 7 for level 4, 8 or more for level 5)     ED Triage Vitals [03/30/21 2121]   BP Temp Temp Source Pulse Resp SpO2 Height Weight   (!) 164/85 97.9 °F (36.6 °C) Oral 73 16 96 % 5' 9\" (1.753 m) 199 lb (90.3 kg)       Physical Exam  Vitals signs and nursing note reviewed. Constitutional:       General: She is not in acute distress. Appearance: She is well-developed. She is not diaphoretic. HENT:      Head: Contusion and laceration present. Right Ear: External ear normal.      Left Ear: External ear normal.      Nose: Nose normal.      Mouth/Throat:      Pharynx: No oropharyngeal exudate. Eyes:      General:         Right eye: No discharge. Left eye: No discharge. Pupils: Pupils are equal, round, and reactive to light. Neck:      Musculoskeletal: Normal range of motion. No muscular tenderness. Thyroid: No thyromegaly. Vascular: No JVD. Trachea: No tracheal deviation. Cardiovascular:      Rate and Rhythm: Normal rate and regular rhythm. Heart sounds: Normal heart sounds. No murmur. No friction rub. No gallop. Pulmonary:      Effort: Pulmonary effort is normal. No respiratory distress. Breath sounds: Normal breath sounds. No stridor. No wheezing or rales. Chest:      Chest wall: No tenderness. Abdominal:      General: Bowel sounds are normal. There is no distension. Palpations: Abdomen is soft. There is no mass. Tenderness: There is no abdominal tenderness. Musculoskeletal: Normal range of motion. General: No tenderness or deformity. Lymphadenopathy:      Cervical: No cervical adenopathy. Skin:     General: Skin is warm. Capillary Refill: Capillary refill takes less than 2 seconds. Coloration: Skin is not pale. Findings: Lesion present. No erythema or rash. Neurological:      Mental Status: She is alert and oriented to person, place, and time. Cranial Nerves: No cranial nerve deficit. Motor: No weakness or abnormal muscle tone. Coordination: Coordination normal.      Deep Tendon Reflexes: Reflexes normal.   Psychiatric:         Mood and Affect: Mood normal.         Behavior: Behavior normal.         Thought Content: Thought content normal.         Judgment: Judgment normal.         DIAGNOSTIC RESULTS     Labs Reviewed - No data to display       EKG: All EKG's are interpreted by the Emergency Department Physician who either signs or Co-signs this chart in the absence of a cardiologist.       EKG Interpretation    Interpreted by emergency department physician      Holden Mendenhalld:     Non-plain film images such as CT, Ultrasound and MRI are read by the radiologist. Plain radiographic images are visualized and preliminarily interpreted by the emergency physician. Interpretation per the Radiologist below, if available at the time of this note:    CT Cervical Spine WO Contrast   Final Result   No acute intracranial abnormality. Chronic small vessel ischemic disease. 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 Chronic small vessel ischemic disease.       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 EMERGENCY DEPARTMENT COURSE and DIFFERENTIAL DIAGNOSIS/MDM:   Vitals:    Vitals:    03/30/21 2121   BP: (!) 164/85   Pulse: 73   Resp: 16   Temp: 97.9 °F (36.6 °C)   TempSrc: Oral   SpO2: 96%   Weight: 199 lb (90.3 kg)   Height: 5' 9\" (1.753 m)           MDM  Number of Diagnoses or Management Options  Closed head injury, initial encounter  Fall on same level from slipping, tripping or stumbling, initial encounter  Laceration of scalp, initial encounter  Diagnosis management comments: 69-year-old female presents emergency department status post mechanical fall with laceration to the left parietal scalp. Laceration was anesthetized using lidocaine, epinephrine and tetracaine gel. Area was closed with staples. Patient reports that tetanus is up-to-date. CT of the head and C-spine are negative except for cervical spine arthritis. Patient does not take anticoagulation. Patient tolerated this procedure well. Patient has no other associated symptoms or complaints. Will discharge patient to home with return precautions and recommend staple removal in the next 7 days. Amount and/or Complexity of Data Reviewed  Tests in the radiology section of CPT®: ordered and reviewed    Risk of Complications, Morbidity, and/or Mortality  Presenting problems: moderate  Diagnostic procedures: moderate  Management options: moderate    Critical Care  Total time providing critical care: < 30 minutes    Patient Progress  Patient progress: improved          -  Patient seen and evaluated in the emergency department. -  Triage and nursing notes reviewed and incorporated. -  Old chart records reviewed and incorporated. -  Work-up included:  See above  -  Results discussed with patient.       REASSESSMENT          CRITICAL CARE TIME     This excludes seperately billable procedures and family discussion time. Critical care time provided for obtaining history, conducting a physical exam, performing and monitoring interventions, ordering, collecting and interpreting tests, and establishing medical decision-making. There was a potential for life/limb threatening pathology requiring close evaluation and intervention with concern for patient decompensation. CONSULTS:  None    PROCEDURES:  None performed unless otherwise noted below     Lac Repair    Date/Time: 3/31/2021 12:49 AM  Performed by: Jazlyn Key DO  Authorized by: Jazlyn Key DO     Consent:     Consent obtained:  Verbal    Consent given by:  Patient    Risks discussed:  Infection, pain, poor cosmetic result, poor wound healing, nerve damage, need for additional repair, retained foreign body and vascular damage    Alternatives discussed:  No treatment and observation  Anesthesia (see MAR for exact dosages): Anesthesia method:  Topical application    Topical anesthetic:  LET  Laceration details:     Location:  Scalp    Scalp location:  L parietal    Length (cm):  4    Depth (mm):  3  Repair type:     Repair type:  Simple  Pre-procedure details:     Preparation:  Patient was prepped and draped in usual sterile fashion and imaging obtained to evaluate for foreign bodies  Exploration:     Hemostasis achieved with:  LET and direct pressure    Wound exploration: wound explored through full range of motion and entire depth of wound probed and visualized      Wound extent: areolar tissue violated      Contaminated: no    Treatment:     Area cleansed with:  Saline    Amount of cleaning:  Standard    Irrigation solution:  Sterile saline  Skin repair:     Repair method:  Staples    Number of staples:  5  Approximation:     Approximation:  Close  Post-procedure details:     Dressing:  Open (no dressing)    Patient tolerance of procedure:   Tolerated well, no immediate complications            FINAL IMPRESSION      1. Closed head injury, initial encounter    2. Laceration of scalp, initial encounter    3. Fall on same level from slipping, tripping or stumbling, initial encounter          DISPOSITION/PLAN   DISPOSITION Decision To Discharge 03/30/2021 11:42:46 PM      PATIENT REFERRED TO:  No follow-up provider specified. DISCHARGE MEDICATIONS:  New Prescriptions    No medications on file       ED Provider Disposition Time  DISPOSITION Decision To Discharge 03/30/2021 11:42:46 PM      Appropriate personal protective equipment was worn during the patient's evaluation. These included surgical, eye protection, surgical mask or in 95 respirator and gloves. The patient was also placed in a surgical mask for the prevention of possible spread of respiratory viral illnesses. The Patient was instructed to read the package inserts with any medication that was prescribed. Major potential reactions and medication interactions were discussed. The Patient understands that there are numerous possible adverse reactions not covered. The patient was also instructed to arrange follow-up with his or her primary care provider for review of any pending labwork or incidental findings on any radiology results that were obtained. All efforts were made to discuss any incidental findings that require further monitoring. Controlled Substances Monitoring:     No flowsheet data found.     (Please note that portions of this note were completed with a voice recognition program.  Efforts were made to edit the dictations but occasionally words are mis-transcribed.)    Campos France DO (electronically signed)  Attending Emergency Physician            Campos France DO  03/31/21 0050

## 2021-04-01 ENCOUNTER — OFFICE VISIT (OUTPATIENT)
Dept: CARDIOLOGY CLINIC | Age: 86
End: 2021-04-01
Payer: MEDICARE

## 2021-04-01 VITALS
RESPIRATION RATE: 16 BRPM | HEIGHT: 70 IN | TEMPERATURE: 96.7 F | DIASTOLIC BLOOD PRESSURE: 60 MMHG | HEART RATE: 72 BPM | BODY MASS INDEX: 27.49 KG/M2 | SYSTOLIC BLOOD PRESSURE: 118 MMHG | WEIGHT: 192 LBS

## 2021-04-01 DIAGNOSIS — M25.473 ANKLE EDEMA: ICD-10-CM

## 2021-04-01 DIAGNOSIS — I10 ESSENTIAL HYPERTENSION: ICD-10-CM

## 2021-04-01 DIAGNOSIS — R00.1 BRADYCARDIA: ICD-10-CM
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
## 2021-04-01 PROCEDURE — G8417 CALC BMI ABV UP PARAM F/U: HCPCS | Performed by: INTERNAL MEDICINE

## 2021-04-01 PROCEDURE — 1036F TOBACCO NON-USER: CPT | Performed by: INTERNAL MEDICINE

## 2021-04-01 PROCEDURE — 99212 OFFICE O/P EST SF 10 MIN: CPT | Performed by: INTERNAL MEDICINE

## 2021-04-01 PROCEDURE — 4040F PNEUMOC VAC/ADMIN/RCVD: CPT | Performed by: INTERNAL MEDICINE

## 2021-04-01 PROCEDURE — G8399 PT W/DXA RESULTS DOCUMENT: HCPCS | Performed by: INTERNAL MEDICINE

## 2021-04-01 PROCEDURE — G8428 CUR MEDS NOT DOCUMENT: HCPCS | Performed by: INTERNAL MEDICINE

## 2021-04-01 PROCEDURE — 1090F PRES/ABSN URINE INCON ASSESS: CPT | Performed by: INTERNAL MEDICINE

## 2021-04-01 PROCEDURE — 1123F ACP DISCUSS/DSCN MKR DOCD: CPT | Performed by: INTERNAL MEDICINE

## 2021-04-01 NOTE — PROGRESS NOTES
Pearl Martinez MD                                  CARDIOLOGY  NOTE            Chief Complaint:    Chief Complaint   Patient presents with    Coronary Artery Disease     Pt states she has left-legged edema. Pt denies any other cardiac concerns.  Hypertension    Diabetes      Patient had a mechanical fall when she tripped over a rug while assisting a person in an assisted living. Patient denies loss of consciousness she was taken to the ER and eventually discharged. Patient presents for follow-up    Denies any chest pain shortness of breath or palpitations  She uses a walker to ambulate, as percent has been unsteady recently      HPI:     Chantell Navas is a 81y.o. year old female who presents as a follow-up after complete heart block status post permanent pacemaker. Patient was seen in the hospital in 2020 for dizziness lethargy and fatigue, when she was noted to have complete heart block. Patient denies any further syncope dizziness chest pain shortness of breath.  + LE edema    Echocardiogram 2020     Left ventricular systolic function is normal.   Ejection fraction is visually estimated at 50-55%. Mild left ventricular hypertrophy. Grade I diastolic dysfunction. PPM wiring visualized in right heart. Large mitral annular calcification present with mild mitral stenosis; mean   PmmHg. Mild mitral regurgitation. Moderate tricuspid regurgitation; RVSP is 50mmHg. Moderate PHTN present. No evidence of any pericardial effusion. Current Outpatient Medications   Medication Sig Dispense Refill    furosemide (LASIX) 20 MG tablet Take 1 tablet by mouth daily 90 tablet 1    hydroCHLOROthiazide (MICROZIDE) 12.5 MG capsule Take 1 capsule by mouth daily 30 capsule 3    ibuprofen (ADVIL;MOTRIN) 600 MG tablet Take 600 mg by mouth every 8 hours as needed for Pain      ammonium lactate (AMLACTIN) 12 % cream Apply topically as needed for Dry Skin Apply topically as needed.       amLODIPine (NORVASC) 5 MG tablet Take 5 mg by mouth daily      sertraline (ZOLOFT) 25 MG tablet Take 1 tablet by mouth nightly       ferrous sulfate (IRON 325) 325 (65 Fe) MG tablet Take 1 tablet by mouth every other day 30 tablet 0    sennosides-docusate sodium (SENOKOT-S) 8.6-50 MG tablet Take 2 tablets by mouth daily as needed for Constipation (Patient taking differently: Take 2 tablets by mouth every evening ) 1 tablet 0    acetaminophen (TYLENOL) 500 MG tablet Take 2 tablets by mouth every 8 hours for 7 days After January 5, 2021, change this medication to prn 1 tablet 0    aspirin 81 MG chewable tablet Take 81 mg by mouth daily      atorvastatin (LIPITOR) 10 MG tablet Take 10 mg by mouth daily      budesonide-formoterol (SYMBICORT) 160-4.5 MCG/ACT AERO Inhale 2 puffs into the lungs 2 times daily      losartan (COZAAR) 100 MG tablet Take 100 mg by mouth daily      pantoprazole (PROTONIX) 20 MG tablet Take 20 mg by mouth daily      albuterol sulfate HFA (VENTOLIN HFA) 108 (90 Base) MCG/ACT inhaler Inhale 2 puffs into the lungs every 6 hours as needed for Wheezing      vitamin D (CHOLECALCIFEROL) 25 MCG (1000 UT) TABS tablet Take 1,000 Units by mouth daily       No current facility-administered medications for this visit.         Allergies:     Lisinopril, Pcn [penicillins], and Sulfa antibiotics    Patient History:    Past Medical History:   Diagnosis Date    [facility]     bilateral    Asthma     CAD (coronary artery disease)     [facility]congest[facility] failure[facility]     Constipation     DDD (degenerative disc disease), cervical     [facility]     Femur fracture, right ([facility])     distal femur    GERD (gastroesophageal reflux disease)     Hypertension     Venous stasis      Past Surgical History:   Procedure Laterality Date    APPENDECTOMY      KNEE SURGERY      PACEMAKER INSERTION      AV sequential pacer    PACEMAKER INSERTION N/A 12/27/2020    PACEMAKER INSERTION PERMANENT performed by Maykel Mackey MD at [facility]  12/27/2020    AV sequential     No family history on file. Social History     Tobacco Use    Smoking status: Never Smoker    Smokeless tobacco: Never Used   Substance Use Topics    Alcohol use: Not Currently        Review of Systems:     · Constitutional:  No Fever or Weight Loss   · Eyes: No Decreased Vision  · ENT: No Headaches, Hearing Loss or Vertigo  · Cardiovascular: No Chest Pain,  No Shortness of breath, No Palpitations. + Edema   · Respiratory: No cough or wheezing . No Respiratory distress   · Gastrointestinal: No abdominal pain, appetite loss, blood in stools, constipation, diarrhea or heartburn  · Genitourinary: No dysuria, trouble voiding, or hematuria  · Musculoskeletal:  denies any new  joint aches , or pain   · Integumentary: No rash or pruritis  · Neurological: No TIA or stroke symptoms  · Psychiatric: No anxiety or depression  · Endocrine: No malaise, fatigue or temperature intolerance  · Hematologic/Lymphatic: No bleeding problems, blood clots or swollen lymph nodes  · Allergic/Immunologic: No nasal congestion or hives        Objective:      Physical Exam:    /60   Pulse 72   Temp 96.7 °F (35.9 °C)   Resp 16   Ht 5' 9.5\" (1.765 m)   Wt 192 lb (87.1 kg)   BMI 27.95 kg/m²   Wt Readings from Last 3 Encounters:   04/01/21 192 lb (87.1 kg)   03/30/21 199 lb (90.3 kg)   03/16/21 188 lb 6.4 oz (85.5 kg)     Body mass index is 27.95 kg/m². Vitals:    04/01/21 0934   BP: 118/60   Pulse: 72   Resp: 16   Temp: 96.7 °F (35.9 °C)        General Appearance and Constitutional: Conversant, Well developed, Well nourished, No acute distress, Non-toxic appearance. HEENT:  Normocephalic, Atraumatic, Bilateral external ears normal, Oropharynx moist, No oral exudates,   Nose normal.   Neck- Normal range of motion, No tenderness, Supple  Eyes:  EOMI, Conjunctiva normal, No discharge. Respiratory:  Normal breath sounds, No respiratory distress, No wheezing, No Rales, No Ronchi. No chest tenderness. Cardiovascular: S1-S2, no added heart sounds, No Mumurs appreciated. No gallops, rubs. + 2 Pedal Edema   GI:  Bowel sounds normal, Soft, No tenderness,  :  No costovertebral angle tenderness   Musculoskeletal:  No gross deformities. Back- No tenderness  Integument:  Well hydrated, no rash   Lymphatic:  No lymphadenopathy noted   Neurologic:  Alert & oriented x 3, Normal motor function, normal sensory function, no focal deficits noted   Psychiatric:  Speech and behavior appropriate       Medical decision making and Data review:    DATA:    Lab Results   Component Value Date    TROPONINT <0.010 03/16/2021     BNP:  No results found for: PROBNP  PT/INR:  No results found for: PTINR  No results found for: LABA1C  No results found for: CHOL, TRIG, HDL, LDLCALC, LDLDIRECT  Lab Results   Component Value Date    WBC 7.6 03/16/2021    HGB 10.9 (L) 03/16/2021    HCT 36.7 (L) 03/16/2021    MCV 97.9 03/16/2021     03/16/2021     TSH: No results found for: TSH  Lab Results   Component Value Date    AST 21 12/26/2020    ALT 16 12/26/2020    BILITOT 0.5 12/26/2020    ALKPHOS 84 12/26/2020         All labs, medications and tests reviewed by myself including data and history from outside source , patient and available family . 1. Fall, sequela    2. Bradycardia    3. Essential hypertension    4. Ankle edema         Impression and Plan:      1. Mechanical fall : Continue to use walker, encourage physical therapy at assisted living  2. Complete heart block status post permanent pacemaker. Pacemaker interrogation in the office shows 99% V paced. EKG shows sinus rhythm V paced. Patient denies any further symptoms of dizziness lethargy or presyncope. 3. Lower extremity edema, mild pulmonary hypertension: Start patient on low-dose Lasix 20 mg daily -continue  4.  Essential hypertension: Continue with Losartan hydrochlorothiazide/amlodipine. Added Lasix. 5. Hyperlipidemia: Continue with low-dose statin therapy  6. Obesity with BMI of 29.37 diet and Exercise as tolerated        No follow-ups on file. Counseled extensively and medication compliance urged. We discussed that for the  prevention of ASCVD our  goal is aggressive risk modification. Patient is encouraged to exercise even a brisk walk for 30 minutes  at least 3 to 4 times a week   Various goals were discussed and questions answered. Continue current medications. Appropriate prescriptions are addressed and refills ordered. Questions answered and patient verbalizes understanding. Call for any problems, questions, or concerns.

## 2021-04-13 ENCOUNTER — PROCEDURE VISIT (OUTPATIENT)
Dept: CARDIOLOGY CLINIC | Age: 86
End: 2021-04-13

## 2021-04-13 DIAGNOSIS — Z95.0 CARDIAC PACEMAKER IN SITU: ICD-10-CM

## 2021-04-13 PROCEDURE — 93294 REM INTERROG EVL PM/LDLS PM: CPT | Performed by: INTERNAL MEDICINE

## 2021-04-13 PROCEDURE — 93296 REM INTERROG EVL PM/IDS: CPT | Performed by: INTERNAL MEDICINE

## 2021-05-21 ENCOUNTER — TELEPHONE (OUTPATIENT)
Dept: CARDIOLOGY CLINIC | Age: 86
End: 2021-05-21

## 2021-06-21 ENCOUNTER — PROCEDURE VISIT (OUTPATIENT)
Dept: CARDIOLOGY CLINIC | Age: 86
End: 2021-06-21

## 2021-06-21 DIAGNOSIS — Z95.0 CARDIAC PACEMAKER IN SITU: ICD-10-CM

## 2021-06-23 PROCEDURE — 93296 REM INTERROG EVL PM/IDS: CPT | Performed by: INTERNAL MEDICINE

## 2021-06-23 PROCEDURE — 93294 REM INTERROG EVL PM/LDLS PM: CPT | Performed by: INTERNAL MEDICINE

## 2021-09-17 ENCOUNTER — OFFICE VISIT (OUTPATIENT)
Dept: CARDIOLOGY CLINIC | Age: 86
End: 2021-09-17
Payer: MEDICARE

## 2021-09-17 VITALS
HEIGHT: 70 IN | HEART RATE: 72 BPM | WEIGHT: 183.4 LBS | BODY MASS INDEX: 26.26 KG/M2 | SYSTOLIC BLOOD PRESSURE: 120 MMHG | DIASTOLIC BLOOD PRESSURE: 64 MMHG

## 2021-09-17 DIAGNOSIS — I10 ESSENTIAL HYPERTENSION: ICD-10-CM

## 2021-09-17 DIAGNOSIS — I44.2 COMPLETE HEART BLOCK (HCC): Primary | ICD-10-CM

## 2021-09-17 DIAGNOSIS — E78.5 DYSLIPIDEMIA: ICD-10-CM

## 2021-09-17 DIAGNOSIS — Z95.0 CARDIAC PACEMAKER IN SITU: ICD-10-CM

## 2021-09-17 DIAGNOSIS — R60.0 EDEMA OF LOWER EXTREMITY: ICD-10-CM

## 2021-09-17 PROCEDURE — 1123F ACP DISCUSS/DSCN MKR DOCD: CPT | Performed by: INTERNAL MEDICINE

## 2021-09-17 PROCEDURE — 4040F PNEUMOC VAC/ADMIN/RCVD: CPT | Performed by: INTERNAL MEDICINE

## 2021-09-17 PROCEDURE — 1090F PRES/ABSN URINE INCON ASSESS: CPT | Performed by: INTERNAL MEDICINE

## 2021-09-17 PROCEDURE — G8399 PT W/DXA RESULTS DOCUMENT: HCPCS | Performed by: INTERNAL MEDICINE

## 2021-09-17 PROCEDURE — 99214 OFFICE O/P EST MOD 30 MIN: CPT | Performed by: INTERNAL MEDICINE

## 2021-09-17 PROCEDURE — G8417 CALC BMI ABV UP PARAM F/U: HCPCS | Performed by: INTERNAL MEDICINE

## 2021-09-17 PROCEDURE — 1036F TOBACCO NON-USER: CPT | Performed by: INTERNAL MEDICINE

## 2021-09-17 PROCEDURE — G8428 CUR MEDS NOT DOCUMENT: HCPCS | Performed by: INTERNAL MEDICINE

## 2021-09-17 RX ORDER — VITAMIN E 268 MG
180 CAPSULE ORAL DAILY
COMMUNITY

## 2021-09-17 RX ORDER — LANOLIN ALCOHOL/MO/W.PET/CERES
1000 CREAM (GRAM) TOPICAL DAILY
COMMUNITY
End: 2022-04-22

## 2021-09-17 RX ORDER — DONEPEZIL HYDROCHLORIDE 5 MG/1
5 TABLET, FILM COATED ORAL NIGHTLY
COMMUNITY

## 2021-09-17 NOTE — PROGRESS NOTES
Nargis Crowley MD                                  CARDIOLOGY  NOTE            Chief Complaint:    Chief Complaint   Patient presents with    6 Month Follow-Up     Patient denies CP, SOB, palpitations, dizziness or edema. Denies any issues with pacemaker. Denies any more falls since last office visit. Uses wheeled walker for ambulation. States she has been feeling really good. Has lost weight since last office visit. Has been avoiding sweets. Has been walking more and using stationary bike. States not currently taking Hydrochlorothiazide           Doing great  No recent falls  Still uses a walker  PPM working ok        HPI:     Whitney Garza is a 81y.o. year old female who presents as a follow-up after complete heart block status post permanent pacemaker. Patient was seen in the hospital in 2020 for dizziness lethargy and fatigue, when she was noted to have complete heart block. Patient denies any further syncope dizziness chest pain shortness of breath.  + LE edema    Echocardiogram 2020     Left ventricular systolic function is normal.   Ejection fraction is visually estimated at 50-55%. Mild left ventricular hypertrophy. Grade I diastolic dysfunction. PPM wiring visualized in right heart. Large mitral annular calcification present with mild mitral stenosis; mean   PmmHg. Mild mitral regurgitation. Moderate tricuspid regurgitation; RVSP is 50mmHg. Moderate PHTN present. No evidence of any pericardial effusion.       Current Outpatient Medications   Medication Sig Dispense Refill    vitamin E 180 MG (400 UNIT) CAPS capsule Take 180 mg by mouth daily      vitamin B-12 (CYANOCOBALAMIN) 1000 MCG tablet Take 1,000 mcg by mouth daily      donepezil (ARICEPT) 5 MG tablet Take 5 mg by mouth nightly      furosemide (LASIX) 20 MG tablet Take 1 tablet by mouth daily 90 tablet 1    ibuprofen (ADVIL;MOTRIN) 600 MG tablet Take 600 mg by mouth every 8 hours as needed for Pain      ammonium lactate (AMLACTIN) 12 % cream Apply topically as needed for Dry Skin Apply topically as needed.  amLODIPine (NORVASC) 5 MG tablet Take 5 mg by mouth daily      sertraline (ZOLOFT) 25 MG tablet Take 1 tablet by mouth nightly       ferrous sulfate (IRON 325) 325 (65 Fe) MG tablet Take 1 tablet by mouth every other day 30 tablet 0    sennosides-docusate sodium (SENOKOT-S) 8.6-50 MG tablet Take 2 tablets by mouth daily as needed for Constipation (Patient taking differently: Take 2 tablets by mouth every evening ) 1 tablet 0    acetaminophen (TYLENOL) 500 MG tablet Take 2 tablets by mouth every 8 hours for 7 days After January 5, 2021, change this medication to prn 1 tablet 0    aspirin 81 MG chewable tablet Take 81 mg by mouth daily      atorvastatin (LIPITOR) 10 MG tablet Take 10 mg by mouth daily      budesonide-formoterol (SYMBICORT) 160-4.5 MCG/ACT AERO Inhale 2 puffs into the lungs 2 times daily      losartan (COZAAR) 100 MG tablet Take 100 mg by mouth daily      pantoprazole (PROTONIX) 20 MG tablet Take 20 mg by mouth daily      albuterol sulfate HFA (VENTOLIN HFA) 108 (90 Base) MCG/ACT inhaler Inhale 2 puffs into the lungs every 6 hours as needed for Wheezing      vitamin D (CHOLECALCIFEROL) 25 MCG (1000 UT) TABS tablet Take 1,000 Units by mouth daily      hydroCHLOROthiazide (MICROZIDE) 12.5 MG capsule Take 1 capsule by mouth daily (Patient not taking: Reported on 9/17/2021) 30 capsule 3     No current facility-administered medications for this visit.        Allergies:     Lisinopril, Pcn [penicillins], and Sulfa antibiotics    Patient History:    Past Medical History:   Diagnosis Date    Arthrofibrosis of total knee arthroplasty ([facility]     bilateral    Asthma     CAD (coronary artery disease)     CHF (congestive heart failure) (HCC)     Constipation     DDD (degenerative disc disease), cervical     Diabetes mellitus ([facility]     [facility]     distal femur    GERD (gastroesophageal reflux disease)     Hypertension     Venous stasis      Past Surgical History:   Procedure Laterality Date    APPENDECTOMY      KNEE SURGERY      PACEMAKER INSERTION      AV sequential pacer    PACEMAKER INSERTION N/A 12/27/2020    PACEMAKER INSERTION PERMANENT performed by Graham Renteria MD at [facility]  12/27/2020    AV sequential     History reviewed. No pertinent family history. Social History     Tobacco Use    Smoking status: Never Smoker    Smokeless tobacco: Never Used   Substance Use Topics    Alcohol use: Not Currently        Review of Systems:     · Constitutional:  No Fever or Weight Loss   · Eyes: No Decreased Vision  · ENT: No Headaches, Hearing Loss or Vertigo  · Cardiovascular: No Chest Pain,  No Shortness of breath, No Palpitations. + Edema   · Respiratory: No cough or wheezing . No Respiratory distress   · Gastrointestinal: No abdominal pain, appetite loss, blood in stools, constipation, diarrhea or heartburn  · Genitourinary: No dysuria, trouble voiding, or hematuria  · Musculoskeletal:  denies any new  joint aches , or pain   · Integumentary: No rash or pruritis  · Neurological: No TIA or stroke symptoms  · Psychiatric: No anxiety or depression  · Endocrine: No malaise, fatigue or temperature intolerance  · Hematologic/Lymphatic: No bleeding problems, blood clots or swollen lymph nodes  · Allergic/Immunologic: No nasal congestion or hives        Objective:      Physical Exam:    /64   Pulse 72   Ht 5' 9.5\" (1.765 m)   Wt 183 lb 6.4 oz (83.2 kg)   BMI 26.70 kg/m²   Wt Readings from Last 3 Encounters:   09/17/21 183 lb 6.4 oz (83.2 kg)   04/01/21 192 lb (87.1 kg)   03/30/21 199 lb (90.3 kg)     Body mass index is 26.7 kg/m². Vitals:    09/17/21 0938   BP: 120/64   Pulse: 72        General Appearance and Constitutional: Conversant, Well developed, Well nourished, No acute distress, Non-toxic appearance.    HEENT:  Normocephalic, Atraumatic, Bilateral external ears normal, Oropharynx moist, No oral exudates,   Nose normal.   Neck- Normal range of motion, No tenderness, Supple  Eyes:  EOMI, Conjunctiva normal, No discharge. Respiratory:  Normal breath sounds, No respiratory distress, No wheezing, No Rales, No Ronchi. No chest tenderness. Cardiovascular: S1-S2, no added heart sounds, No Mumurs appreciated. No gallops, rubs. + 2 Pedal Edema   GI:  Bowel sounds normal, Soft, No tenderness,  :  No costovertebral angle tenderness   Musculoskeletal:  No gross deformities. Back- No tenderness  Integument:  Well hydrated, no rash   Lymphatic:  No lymphadenopathy noted   Neurologic:  Alert & oriented x 3, Normal motor function, normal sensory function, no focal deficits noted   Psychiatric:  Speech and behavior appropriate       Medical decision making and Data review:    DATA:    Lab Results   Component Value Date    TROPONINT <0.010 03/16/2021     BNP:  No results found for: PROBNP  PT/INR:  No results found for: PTINR  No results found for: LABA1C  No results found for: CHOL, TRIG, HDL, LDLCALC, LDLDIRECT  Lab Results   Component Value Date    WBC 7.6 03/16/2021    HGB 10.9 (L) 03/16/2021    HCT 36.7 (L) 03/16/2021    MCV 97.9 03/16/2021     03/16/2021     TSH: No results found for: TSH  Lab Results   Component Value Date    AST 21 12/26/2020    ALT 16 12/26/2020    BILITOT 0.5 12/26/2020    ALKPHOS 84 12/26/2020         All labs, medications and tests reviewed by myself including data and history from outside source , patient and available family . 1. Complete heart block (HCC)    2. Cardiac pacemaker in situ    3. Edema of lower extremity    4. Essential hypertension    5. Dyslipidemia         Impression and Plan:      1. Complete heart block status post permanent pacemaker. Pacemaker interrogation 6/21/2021 shows 99% V paced. EKG shows sinus rhythm V paced.   Patient denies any further symptoms of dizziness lethargy or presyncope. 2. Lower extremity edema, mild pulmonary hypertension: Cont Lasix 20 mg daily -continue  3. Essential hypertension: Continue with Losartan hydrochlorothiazide/amlodipine. Added Lasix. 4. Hyperlipidemia: Continue with low-dose statin therapy  5. Health maintenance:  diet and Exercise as tolerated        Return in about 6 months (around 3/17/2022). Counseled extensively and medication compliance urged. We discussed that for the  prevention of ASCVD our  goal is aggressive risk modification. Patient is encouraged to exercise even a brisk walk for 30 minutes  at least 3 to 4 times a week   Various goals were discussed and questions answered. Continue current medications. Appropriate prescriptions are addressed and refills ordered. Questions answered and patient verbalizes understanding. Call for any problems, questions, or concerns.

## 2021-09-27 ENCOUNTER — PROCEDURE VISIT (OUTPATIENT)
Dept: CARDIOLOGY CLINIC | Age: 86
End: 2021-09-27
Payer: MEDICARE

## 2021-09-27 DIAGNOSIS — Z95.0 CARDIAC PACEMAKER IN SITU: ICD-10-CM

## 2021-09-28 PROCEDURE — 93294 REM INTERROG EVL PM/LDLS PM: CPT | Performed by: INTERNAL MEDICINE

## 2021-09-28 PROCEDURE — 93296 REM INTERROG EVL PM/IDS: CPT | Performed by: INTERNAL MEDICINE

## 2021-11-15 ENCOUNTER — PROCEDURE VISIT (OUTPATIENT)
Dept: CARDIOLOGY CLINIC | Age: 86
End: 2021-11-15

## 2021-11-15 DIAGNOSIS — Z95.0 CARDIAC PACEMAKER IN SITU: ICD-10-CM

## 2021-11-15 PROCEDURE — 93294 REM INTERROG EVL PM/LDLS PM: CPT | Performed by: INTERNAL MEDICINE

## 2021-11-15 PROCEDURE — 93296 REM INTERROG EVL PM/IDS: CPT | Performed by: INTERNAL MEDICINE

## 2021-12-27 ENCOUNTER — PROCEDURE VISIT (OUTPATIENT)
Dept: CARDIOLOGY CLINIC | Age: 86
End: 2021-12-27

## 2021-12-27 DIAGNOSIS — Z95.0 CARDIAC PACEMAKER IN SITU: ICD-10-CM

## 2021-12-27 PROCEDURE — 93294 REM INTERROG EVL PM/LDLS PM: CPT | Performed by: INTERNAL MEDICINE

## 2021-12-27 PROCEDURE — 93296 REM INTERROG EVL PM/IDS: CPT | Performed by: INTERNAL MEDICINE

## 2022-01-03 ENCOUNTER — PROCEDURE VISIT (OUTPATIENT)
Dept: CARDIOLOGY CLINIC | Age: 87
End: 2022-01-03

## 2022-01-03 DIAGNOSIS — Z95.0 CARDIAC PACEMAKER IN SITU: ICD-10-CM

## 2022-03-11 ENCOUNTER — OFFICE VISIT (OUTPATIENT)
Dept: CARDIOLOGY CLINIC | Age: 87
End: 2022-03-11
Payer: MEDICARE

## 2022-03-11 VITALS
DIASTOLIC BLOOD PRESSURE: 64 MMHG | BODY MASS INDEX: 26.6 KG/M2 | SYSTOLIC BLOOD PRESSURE: 120 MMHG | HEART RATE: 66 BPM | HEIGHT: 71 IN | WEIGHT: 190 LBS

## 2022-03-11 DIAGNOSIS — R60.0 EDEMA OF LOWER EXTREMITY: ICD-10-CM

## 2022-03-11 DIAGNOSIS — E78.5 DYSLIPIDEMIA: ICD-10-CM

## 2022-03-11 DIAGNOSIS — I44.2 COMPLETE HEART BLOCK (HCC): Primary | ICD-10-CM

## 2022-03-11 DIAGNOSIS — Z95.0 CARDIAC PACEMAKER IN SITU: ICD-10-CM

## 2022-03-11 DIAGNOSIS — I10 ESSENTIAL HYPERTENSION: ICD-10-CM

## 2022-03-11 PROCEDURE — 99214 OFFICE O/P EST MOD 30 MIN: CPT | Performed by: INTERNAL MEDICINE

## 2022-03-11 RX ORDER — DIPHENHYDRAMINE HCL 25 MG
25 TABLET ORAL EVERY 6 HOURS PRN
Status: ON HOLD | COMMUNITY
End: 2022-05-12 | Stop reason: HOSPADM

## 2022-03-11 NOTE — PROGRESS NOTES
Doug Her MD                                  CARDIOLOGY  NOTE            Chief Complaint:    Chief Complaint   Patient presents with    Hypertension     Denies cardiac complaints.  Coronary Artery Disease    Congestive Heart Failure          Doing great  No recent falls  Still uses a walker  PPM working ok        HPI:     Nasir Staples is a 81y.o. year old female who presents as a follow-up after complete heart block status post permanent pacemaker. Patient was seen in the hospital in 2020 for dizziness lethargy and fatigue, when she was noted to have complete heart block. Patient denies any further syncope dizziness chest pain shortness of breath.  + LE edema    Echocardiogram 2020     Left ventricular systolic function is normal.   Ejection fraction is visually estimated at 50-55%. Mild left ventricular hypertrophy. Grade I diastolic dysfunction. PPM wiring visualized in right heart. Large mitral annular calcification present with mild mitral stenosis; mean   PmmHg. Mild mitral regurgitation. Moderate tricuspid regurgitation; RVSP is 50mmHg. Moderate PHTN present. No evidence of any pericardial effusion.       Current Outpatient Medications   Medication Sig Dispense Refill    diphenhydrAMINE (BENADRYL) 25 MG tablet Take 25 mg by mouth every 6 hours as needed for Itching      vitamin E 180 MG (400 UNIT) CAPS capsule Take 180 mg by mouth daily      donepezil (ARICEPT) 5 MG tablet Take 5 mg by mouth nightly      furosemide (LASIX) 20 MG tablet Take 1 tablet by mouth daily 90 tablet 1    hydroCHLOROthiazide (MICROZIDE) 12.5 MG capsule Take 1 capsule by mouth daily 30 capsule 3    ibuprofen (ADVIL;MOTRIN) 600 MG tablet Take 600 mg by mouth every 8 hours as needed for Pain      amLODIPine (NORVASC) 5 MG tablet Take 5 mg by mouth daily      sertraline (ZOLOFT) 25 MG tablet Take 1 tablet by mouth nightly       ferrous sulfate (IRON 325) 325 (65 Fe) MG tablet Take 1 tablet by mouth every other day 30 tablet 0    sennosides-docusate sodium (SENOKOT-S) 8.6-50 MG tablet Take 2 tablets by mouth daily as needed for Constipation (Patient taking differently: Take 2 tablets by mouth every evening ) 1 tablet 0    aspirin 81 MG chewable tablet Take 81 mg by mouth daily      atorvastatin (LIPITOR) 10 MG tablet Take 10 mg by mouth daily      budesonide-formoterol (SYMBICORT) 160-4.5 MCG/ACT AERO Inhale 2 puffs into the lungs 2 times daily      losartan (COZAAR) 100 MG tablet Take 100 mg by mouth daily      pantoprazole (PROTONIX) 20 MG tablet Take 20 mg by mouth daily      albuterol sulfate HFA (VENTOLIN HFA) 108 (90 Base) MCG/ACT inhaler Inhale 2 puffs into the lungs every 6 hours as needed for Wheezing      vitamin D (CHOLECALCIFEROL) 25 MCG (1000 UT) TABS tablet Take 1,000 Units by mouth daily      vitamin B-12 (CYANOCOBALAMIN) 1000 MCG tablet Take 1,000 mcg by mouth daily (Patient not taking: Reported on 3/11/2022)      ammonium lactate (AMLACTIN) 12 % cream Apply topically as needed for Dry Skin Apply topically as needed. (Patient not taking: Reported on 3/11/2022)      acetaminophen (TYLENOL) 500 MG tablet Take 2 tablets by mouth every 8 hours for 7 days After January 5, 2021, change this medication to prn 1 tablet 0     No current facility-administered medications for this visit.        Allergies:     Lisinopril, Pcn [penicillins], and Sulfa antibiotics    Patient History:    Past Medical History:   Diagnosis Date    Arthrofibrosis of total knee arthroplasty ([facility] [address]     bilateral    Asthma     CAD (coronary artery disease)     CHF (congestive heart failure) (HCC)     Constipation     DDD (degenerative disc disease), cervical     Diabetes mellitus (HCC)     Femur fracture, right (HCC)     distal femur    GERD (gastroesophageal reflux disease)     Hypertension     Venous stasis      Past Surgical History:   Procedure Laterality Date    APPENDECTOMY      KNEE SURGERY      PACEMAKER INSERTION      AV sequential pacer    PACEMAKER INSERTION N/A 12/27/2020    PACEMAKER INSERTION PERMANENT performed by Sylvester Caruso MD at [place] [address]  12/27/2020    AV sequential     No family history on file. Social History     Tobacco Use    Smoking status: Never Smoker    Smokeless tobacco: Never Used   Substance Use Topics    Alcohol use: Not Currently        Review of Systems:     · Constitutional:  No Fever or Weight Loss   · Eyes: No Decreased Vision  · ENT: No Headaches, Hearing Loss or Vertigo  · Cardiovascular: No Chest Pain,  No Shortness of breath, No Palpitations. + Edema   · Respiratory: No cough or wheezing . No Respiratory distress   · Gastrointestinal: No abdominal pain, appetite loss, blood in stools, constipation, diarrhea or heartburn  · Genitourinary: No dysuria, trouble voiding, or hematuria  · Musculoskeletal:  denies any new  joint aches , or pain   · Integumentary: No rash or pruritis  · Neurological: No TIA or stroke symptoms  · Psychiatric: No anxiety or depression  · Endocrine: No malaise, fatigue or temperature intolerance  · Hematologic/Lymphatic: No bleeding problems, blood clots or swollen lymph nodes  · Allergic/Immunologic: No nasal congestion or hives        Objective:      Physical Exam:    /64   Pulse 66   Ht 5' 10.8\" (1.798 m)   Wt 190 lb (86.2 kg)   BMI 26.65 kg/m²   Wt Readings from Last 3 Encounters:   03/11/22 190 lb (86.2 kg)   09/17/21 183 lb 6.4 oz (83.2 kg)   04/01/21 192 lb (87.1 kg)     Body mass index is 26.65 kg/m². Vitals:    03/11/22 0956   BP: 120/64   Pulse: 66        General Appearance and Constitutional: Conversant, Well developed, Well nourished, No acute distress, Non-toxic appearance.    HEENT:  Normocephalic, Atraumatic, Bilateral external ears normal, Oropharynx moist, No oral exudates,   Nose normal.   Neck- Normal range of motion, No tenderness, Supple  Eyes:  EOMI, Conjunctiva normal, No discharge. Respiratory:  Normal breath sounds, No respiratory distress, No wheezing, No Rales, No Ronchi. No chest tenderness. Cardiovascular: S1-S2, no added heart sounds, No Mumurs appreciated. No gallops, rubs. + 2 Pedal Edema   GI:  Bowel sounds normal, Soft, No tenderness,  :  No costovertebral angle tenderness   Musculoskeletal:  No gross deformities. Back- No tenderness  Integument:  Well hydrated, no rash   Lymphatic:  No lymphadenopathy noted   Neurologic:  Alert & oriented x 3, Normal motor function, normal sensory function, no focal deficits noted   Psychiatric:  Speech and behavior appropriate       Medical decision making and Data review:    DATA:    Lab Results   Component Value Date    TROPONINT <0.010 03/16/2021     BNP:  No results found for: PROBNP  PT/INR:  No results found for: PTINR  No results found for: LABA1C  No results found for: CHOL, TRIG, HDL, LDLCALC, LDLDIRECT  Lab Results   Component Value Date    WBC 7.6 03/16/2021    HGB 10.9 (L) 03/16/2021    HCT 36.7 (L) 03/16/2021    MCV 97.9 03/16/2021     03/16/2021     TSH: No results found for: TSH  Lab Results   Component Value Date    AST 21 12/26/2020    ALT 16 12/26/2020    BILITOT 0.5 12/26/2020    ALKPHOS 84 12/26/2020         All labs, medications and tests reviewed by myself including data and history from outside source , patient and available family . 1. Complete heart block (HCC)    2. Cardiac pacemaker in situ    3. Edema of lower extremity    4. Essential hypertension    5. Dyslipidemia         Impression and Plan:      1. Complete heart block status post permanent pacemaker. Pacemaker interrogation 1/3/22 shows 99% V paced. DDD mode. EKG shows sinus rhythm V paced. Patient denies any further symptoms of dizziness lethargy or presyncope. 2. Lower extremity edema, mild pulmonary hypertension: Cont Lasix 20 mg daily -continue    3.  Essential hypertension: Continue with Losartan hydrochlorothiazide/amlodipine. Lasix. 4. Hyperlipidemia: Continue with low-dose statin therapy    5. Health maintenance:  diet and Exercise as tolerated        Return in about 6 months (around 9/11/2022). Counseled extensively and medication compliance urged. We discussed that for the  prevention of ASCVD our  goal is aggressive risk modification. Patient is encouraged to exercise even a brisk walk for 30 minutes  at least 3 to 4 times a week   Various goals were discussed and questions answered. Continue current medications. Appropriate prescriptions are addressed and refills ordered. Questions answered and patient verbalizes understanding. Call for any problems, questions, or concerns.

## 2022-04-11 ENCOUNTER — PROCEDURE VISIT (OUTPATIENT)
Dept: CARDIOLOGY CLINIC | Age: 87
End: 2022-04-11
Payer: MEDICARE

## 2022-04-11 DIAGNOSIS — Z95.0 CARDIAC PACEMAKER IN SITU: Primary | ICD-10-CM

## 2022-04-11 PROCEDURE — 93296 REM INTERROG EVL PM/IDS: CPT | Performed by: INTERNAL MEDICINE

## 2022-04-11 PROCEDURE — 93294 REM INTERROG EVL PM/LDLS PM: CPT | Performed by: INTERNAL MEDICINE

## 2022-04-22 ENCOUNTER — HOSPITAL ENCOUNTER (INPATIENT)
Age: 87
LOS: 1 days | Discharge: HOME | DRG: 291 | End: 2022-04-23
Attending: EMERGENCY MEDICINE | Admitting: FAMILY MEDICINE
Payer: MEDICARE

## 2022-04-22 ENCOUNTER — APPOINTMENT (OUTPATIENT)
Dept: GENERAL RADIOLOGY | Age: 87
DRG: 291 | End: 2022-04-22
Payer: MEDICARE

## 2022-04-22 DIAGNOSIS — E87.6 HYPOKALEMIA: ICD-10-CM

## 2022-04-22 DIAGNOSIS — R07.9 CHEST PAIN, UNSPECIFIED TYPE: Primary | ICD-10-CM

## 2022-04-22 DIAGNOSIS — E87.5 HYPERKALEMIA: ICD-10-CM

## 2022-04-22 DIAGNOSIS — I50.9 OTHER CONGESTIVE HEART FAILURE (HCC): ICD-10-CM

## 2022-04-22 PROBLEM — I50.89 OTHER HEART FAILURE (HCC): Status: ACTIVE | Noted: 2022-04-22

## 2022-04-22 LAB
ANION GAP SERPL CALCULATED.3IONS-SCNC: 10 MMOL/L (ref 4–16)
BASOPHILS ABSOLUTE: 0.1 K/CU MM
BASOPHILS RELATIVE PERCENT: 0.9 % (ref 0–1)
BUN BLDV-MCNC: 17 MG/DL (ref 6–23)
CALCIUM SERPL-MCNC: 10.1 MG/DL (ref 8.3–10.6)
CHLORIDE BLD-SCNC: 103 MMOL/L (ref 99–110)
CO2: 27 MMOL/L (ref 21–32)
CREAT SERPL-MCNC: 0.9 MG/DL (ref 0.6–1.1)
D DIMER: 583 NG/ML(DDU)
DIFFERENTIAL TYPE: ABNORMAL
EKG ATRIAL RATE: 63 BPM
EKG DIAGNOSIS: NORMAL
EKG Q-T INTERVAL: 482 MS
EKG QRS DURATION: 156 MS
EKG QTC CALCULATION (BAZETT): 489 MS
EKG R AXIS: -62 DEGREES
EKG T AXIS: 82 DEGREES
EKG VENTRICULAR RATE: 62 BPM
EOSINOPHILS ABSOLUTE: 0.4 K/CU MM
EOSINOPHILS RELATIVE PERCENT: 4.5 % (ref 0–3)
ESTIMATED AVERAGE GLUCOSE: 126 MG/DL
GFR AFRICAN AMERICAN: >60 ML/MIN/1.73M2
GFR NON-AFRICAN AMERICAN: 60 ML/MIN/1.73M2
GLUCOSE BLD-MCNC: 95 MG/DL (ref 70–99)
HBA1C MFR BLD: 6 % (ref 4.2–6.3)
HCT VFR BLD CALC: 34.4 % (ref 37–47)
HEMOGLOBIN: 10.8 GM/DL (ref 12.5–16)
IMMATURE NEUTROPHIL %: 1 % (ref 0–0.43)
IRON: 39 UG/DL (ref 37–145)
LV EF: 50 %
LVEF MODALITY: NORMAL
LYMPHOCYTES ABSOLUTE: 3 K/CU MM
LYMPHOCYTES RELATIVE PERCENT: 33.7 % (ref 24–44)
MAGNESIUM: 1.9 MG/DL (ref 1.8–2.4)
MCH RBC QN AUTO: 30.5 PG (ref 27–31)
MCHC RBC AUTO-ENTMCNC: 31.4 % (ref 32–36)
MCV RBC AUTO: 97.2 FL (ref 78–100)
MONOCYTES ABSOLUTE: 1 K/CU MM
MONOCYTES RELATIVE PERCENT: 10.9 % (ref 0–4)
PCT TRANSFERRIN: 15 % (ref 10–44)
PDW BLD-RTO: 14.1 % (ref 11.7–14.9)
PLATELET # BLD: 248 K/CU MM (ref 140–440)
PMV BLD AUTO: 9 FL (ref 7.5–11.1)
POTASSIUM SERPL-SCNC: 3.9 MMOL/L (ref 3.5–5.1)
PRO-BNP: 2555 PG/ML
RBC # BLD: 3.54 M/CU MM (ref 4.2–5.4)
SEGMENTED NEUTROPHILS ABSOLUTE COUNT: 4.3 K/CU MM
SEGMENTED NEUTROPHILS RELATIVE PERCENT: 49 % (ref 36–66)
SODIUM BLD-SCNC: 140 MMOL/L (ref 135–145)
T4 FREE: 1.18 NG/DL (ref 0.9–1.8)
TOTAL IMMATURE NEUTOROPHIL: 0.09 K/CU MM
TOTAL IRON BINDING CAPACITY: 261 UG/DL (ref 250–450)
TROPONIN T: <0.01 NG/ML
TROPONIN T: <0.01 NG/ML
TSH HIGH SENSITIVITY: 4.52 UIU/ML (ref 0.27–4.2)
UNSATURATED IRON BINDING CAPACITY: 222 UG/DL (ref 110–370)
WBC # BLD: 8.8 K/CU MM (ref 4–10.5)

## 2022-04-22 PROCEDURE — 84443 ASSAY THYROID STIM HORMONE: CPT

## 2022-04-22 PROCEDURE — 83880 ASSAY OF NATRIURETIC PEPTIDE: CPT

## 2022-04-22 PROCEDURE — 99285 EMERGENCY DEPT VISIT HI MDM: CPT

## 2022-04-22 PROCEDURE — 71045 X-RAY EXAM CHEST 1 VIEW: CPT

## 2022-04-22 PROCEDURE — 1200000000 HC SEMI PRIVATE

## 2022-04-22 PROCEDURE — 93005 ELECTROCARDIOGRAM TRACING: CPT | Performed by: EMERGENCY MEDICINE

## 2022-04-22 PROCEDURE — 93306 TTE W/DOPPLER COMPLETE: CPT

## 2022-04-22 PROCEDURE — 84439 ASSAY OF FREE THYROXINE: CPT

## 2022-04-22 PROCEDURE — 85379 FIBRIN DEGRADATION QUANT: CPT

## 2022-04-22 PROCEDURE — 83036 HEMOGLOBIN GLYCOSYLATED A1C: CPT

## 2022-04-22 PROCEDURE — 6370000000 HC RX 637 (ALT 250 FOR IP): Performed by: NURSE PRACTITIONER

## 2022-04-22 PROCEDURE — 83735 ASSAY OF MAGNESIUM: CPT

## 2022-04-22 PROCEDURE — 94664 DEMO&/EVAL PT USE INHALER: CPT

## 2022-04-22 PROCEDURE — 2580000003 HC RX 258: Performed by: NURSE PRACTITIONER

## 2022-04-22 PROCEDURE — 94761 N-INVAS EAR/PLS OXIMETRY MLT: CPT

## 2022-04-22 PROCEDURE — 36415 COLL VENOUS BLD VENIPUNCTURE: CPT

## 2022-04-22 PROCEDURE — 6360000002 HC RX W HCPCS: Performed by: NURSE PRACTITIONER

## 2022-04-22 PROCEDURE — 93010 ELECTROCARDIOGRAM REPORT: CPT | Performed by: INTERNAL MEDICINE

## 2022-04-22 PROCEDURE — 96372 THER/PROPH/DIAG INJ SC/IM: CPT

## 2022-04-22 PROCEDURE — 96374 THER/PROPH/DIAG INJ IV PUSH: CPT

## 2022-04-22 PROCEDURE — 84484 ASSAY OF TROPONIN QUANT: CPT

## 2022-04-22 PROCEDURE — 80048 BASIC METABOLIC PNL TOTAL CA: CPT

## 2022-04-22 PROCEDURE — 85025 COMPLETE CBC W/AUTO DIFF WBC: CPT

## 2022-04-22 PROCEDURE — 6370000000 HC RX 637 (ALT 250 FOR IP): Performed by: EMERGENCY MEDICINE

## 2022-04-22 PROCEDURE — 83550 IRON BINDING TEST: CPT

## 2022-04-22 PROCEDURE — 94640 AIRWAY INHALATION TREATMENT: CPT

## 2022-04-22 PROCEDURE — 83540 ASSAY OF IRON: CPT

## 2022-04-22 PROCEDURE — 96376 TX/PRO/DX INJ SAME DRUG ADON: CPT

## 2022-04-22 PROCEDURE — 6360000002 HC RX W HCPCS: Performed by: FAMILY MEDICINE

## 2022-04-22 PROCEDURE — 6360000002 HC RX W HCPCS: Performed by: EMERGENCY MEDICINE

## 2022-04-22 RX ORDER — ATORVASTATIN CALCIUM 10 MG/1
10 TABLET, FILM COATED ORAL DAILY
Status: DISCONTINUED | OUTPATIENT
Start: 2022-04-22 | End: 2022-04-23 | Stop reason: HOSPADM

## 2022-04-22 RX ORDER — IPRATROPIUM BROMIDE AND ALBUTEROL SULFATE 2.5; .5 MG/3ML; MG/3ML
1 SOLUTION RESPIRATORY (INHALATION) ONCE
Status: COMPLETED | OUTPATIENT
Start: 2022-04-22 | End: 2022-04-22

## 2022-04-22 RX ORDER — FUROSEMIDE 10 MG/ML
20 INJECTION INTRAMUSCULAR; INTRAVENOUS 2 TIMES DAILY
Status: DISCONTINUED | OUTPATIENT
Start: 2022-04-22 | End: 2022-04-23

## 2022-04-22 RX ORDER — VITAMIN B COMPLEX
1000 TABLET ORAL DAILY
Status: DISCONTINUED | OUTPATIENT
Start: 2022-04-22 | End: 2022-04-23 | Stop reason: HOSPADM

## 2022-04-22 RX ORDER — FUROSEMIDE 10 MG/ML
40 INJECTION INTRAMUSCULAR; INTRAVENOUS ONCE
Status: COMPLETED | OUTPATIENT
Start: 2022-04-22 | End: 2022-04-22

## 2022-04-22 RX ORDER — VITAMIN E 268 MG
400 CAPSULE ORAL DAILY
Status: DISCONTINUED | OUTPATIENT
Start: 2022-04-22 | End: 2022-04-23 | Stop reason: HOSPADM

## 2022-04-22 RX ORDER — ASPIRIN 81 MG/1
81 TABLET, CHEWABLE ORAL DAILY
Status: DISCONTINUED | OUTPATIENT
Start: 2022-04-22 | End: 2022-04-23 | Stop reason: HOSPADM

## 2022-04-22 RX ORDER — ACETAMINOPHEN 325 MG/1
650 TABLET ORAL EVERY 6 HOURS PRN
Status: DISCONTINUED | OUTPATIENT
Start: 2022-04-22 | End: 2022-04-23 | Stop reason: HOSPADM

## 2022-04-22 RX ORDER — PANTOPRAZOLE SODIUM 20 MG/1
20 TABLET, DELAYED RELEASE ORAL DAILY
Status: DISCONTINUED | OUTPATIENT
Start: 2022-04-22 | End: 2022-04-23 | Stop reason: HOSPADM

## 2022-04-22 RX ORDER — POTASSIUM CHLORIDE 7.45 MG/ML
10 INJECTION INTRAVENOUS PRN
Status: DISCONTINUED | OUTPATIENT
Start: 2022-04-22 | End: 2022-04-23 | Stop reason: HOSPADM

## 2022-04-22 RX ORDER — SODIUM CHLORIDE 0.9 % (FLUSH) 0.9 %
5-40 SYRINGE (ML) INJECTION EVERY 12 HOURS SCHEDULED
Status: DISCONTINUED | OUTPATIENT
Start: 2022-04-22 | End: 2022-04-23 | Stop reason: HOSPADM

## 2022-04-22 RX ORDER — FERROUS SULFATE 325(65) MG
325 TABLET ORAL EVERY OTHER DAY
Status: DISCONTINUED | OUTPATIENT
Start: 2022-04-22 | End: 2022-04-23 | Stop reason: HOSPADM

## 2022-04-22 RX ORDER — ENOXAPARIN SODIUM 100 MG/ML
40 INJECTION SUBCUTANEOUS DAILY
Status: DISCONTINUED | OUTPATIENT
Start: 2022-04-23 | End: 2022-04-23 | Stop reason: HOSPADM

## 2022-04-22 RX ORDER — SERTRALINE HYDROCHLORIDE 25 MG/1
25 TABLET, FILM COATED ORAL NIGHTLY
Status: DISCONTINUED | OUTPATIENT
Start: 2022-04-22 | End: 2022-04-23 | Stop reason: HOSPADM

## 2022-04-22 RX ORDER — SODIUM CHLORIDE 0.9 % (FLUSH) 0.9 %
5-40 SYRINGE (ML) INJECTION PRN
Status: DISCONTINUED | OUTPATIENT
Start: 2022-04-22 | End: 2022-04-23 | Stop reason: HOSPADM

## 2022-04-22 RX ORDER — SODIUM CHLORIDE 9 MG/ML
25 INJECTION, SOLUTION INTRAVENOUS PRN
Status: DISCONTINUED | OUTPATIENT
Start: 2022-04-22 | End: 2022-04-23 | Stop reason: HOSPADM

## 2022-04-22 RX ORDER — ACETAMINOPHEN 650 MG/1
650 SUPPOSITORY RECTAL EVERY 6 HOURS PRN
Status: DISCONTINUED | OUTPATIENT
Start: 2022-04-22 | End: 2022-04-23 | Stop reason: HOSPADM

## 2022-04-22 RX ORDER — SENNA AND DOCUSATE SODIUM 50; 8.6 MG/1; MG/1
2 TABLET, FILM COATED ORAL EVERY EVENING
Status: DISCONTINUED | OUTPATIENT
Start: 2022-04-22 | End: 2022-04-23 | Stop reason: HOSPADM

## 2022-04-22 RX ORDER — DIPHENHYDRAMINE HCL 25 MG
25 CAPSULE ORAL EVERY 6 HOURS PRN
Status: DISCONTINUED | OUTPATIENT
Start: 2022-04-22 | End: 2022-04-23 | Stop reason: HOSPADM

## 2022-04-22 RX ORDER — MAGNESIUM SULFATE IN WATER 40 MG/ML
2000 INJECTION, SOLUTION INTRAVENOUS PRN
Status: DISCONTINUED | OUTPATIENT
Start: 2022-04-22 | End: 2022-04-23 | Stop reason: HOSPADM

## 2022-04-22 RX ORDER — POTASSIUM CHLORIDE 20 MEQ/1
40 TABLET, EXTENDED RELEASE ORAL PRN
Status: DISCONTINUED | OUTPATIENT
Start: 2022-04-22 | End: 2022-04-23 | Stop reason: HOSPADM

## 2022-04-22 RX ORDER — DONEPEZIL HYDROCHLORIDE 5 MG/1
5 TABLET, FILM COATED ORAL NIGHTLY
Status: DISCONTINUED | OUTPATIENT
Start: 2022-04-22 | End: 2022-04-23 | Stop reason: HOSPADM

## 2022-04-22 RX ORDER — POLYETHYLENE GLYCOL 3350 17 G/17G
17 POWDER, FOR SOLUTION ORAL DAILY PRN
Status: DISCONTINUED | OUTPATIENT
Start: 2022-04-22 | End: 2022-04-23 | Stop reason: HOSPADM

## 2022-04-22 RX ORDER — FUROSEMIDE 10 MG/ML
40 INJECTION INTRAMUSCULAR; INTRAVENOUS 2 TIMES DAILY
Status: DISCONTINUED | OUTPATIENT
Start: 2022-04-22 | End: 2022-04-22

## 2022-04-22 RX ORDER — LOSARTAN POTASSIUM 100 MG/1
100 TABLET ORAL DAILY
Status: DISCONTINUED | OUTPATIENT
Start: 2022-04-22 | End: 2022-04-23 | Stop reason: HOSPADM

## 2022-04-22 RX ORDER — AMLODIPINE BESYLATE 5 MG/1
5 TABLET ORAL DAILY
Status: DISCONTINUED | OUTPATIENT
Start: 2022-04-22 | End: 2022-04-23 | Stop reason: HOSPADM

## 2022-04-22 RX ORDER — ALBUTEROL SULFATE 90 UG/1
2 AEROSOL, METERED RESPIRATORY (INHALATION) EVERY 6 HOURS PRN
Status: DISCONTINUED | OUTPATIENT
Start: 2022-04-22 | End: 2022-04-23 | Stop reason: HOSPADM

## 2022-04-22 RX ORDER — ENOXAPARIN SODIUM 100 MG/ML
30 INJECTION SUBCUTANEOUS 2 TIMES DAILY
Status: DISCONTINUED | OUTPATIENT
Start: 2022-04-22 | End: 2022-04-22

## 2022-04-22 RX ADMIN — DONEPEZIL HYDROCHLORIDE 5 MG: 5 TABLET, FILM COATED ORAL at 20:43

## 2022-04-22 RX ADMIN — FERROUS SULFATE TAB 325 MG (65 MG ELEMENTAL FE) 325 MG: 325 (65 FE) TAB at 09:23

## 2022-04-22 RX ADMIN — Medication 1000 UNITS: at 09:17

## 2022-04-22 RX ADMIN — MOMETASONE FUROATE AND FORMOTEROL FUMARATE DIHYDRATE 2 PUFF: 100; 5 AEROSOL RESPIRATORY (INHALATION) at 08:00

## 2022-04-22 RX ADMIN — LOSARTAN POTASSIUM 100 MG: 100 TABLET, FILM COATED ORAL at 09:17

## 2022-04-22 RX ADMIN — AMLODIPINE BESYLATE 5 MG: 5 TABLET ORAL at 09:17

## 2022-04-22 RX ADMIN — ASPIRIN 81 MG: 81 TABLET, CHEWABLE ORAL at 09:17

## 2022-04-22 RX ADMIN — Medication 400 UNITS: at 09:17

## 2022-04-22 RX ADMIN — IPRATROPIUM BROMIDE AND ALBUTEROL SULFATE 1 AMPULE: .5; 3 SOLUTION RESPIRATORY (INHALATION) at 00:35

## 2022-04-22 RX ADMIN — PANTOPRAZOLE SODIUM 20 MG: 20 TABLET, DELAYED RELEASE ORAL at 06:41

## 2022-04-22 RX ADMIN — SODIUM CHLORIDE, PRESERVATIVE FREE 10 ML: 5 INJECTION INTRAVENOUS at 09:18

## 2022-04-22 RX ADMIN — SERTRALINE 25 MG: 25 TABLET, FILM COATED ORAL at 20:44

## 2022-04-22 RX ADMIN — ENOXAPARIN SODIUM 30 MG: 30 INJECTION SUBCUTANEOUS at 09:17

## 2022-04-22 RX ADMIN — MOMETASONE FUROATE AND FORMOTEROL FUMARATE DIHYDRATE 2 PUFF: 100; 5 AEROSOL RESPIRATORY (INHALATION) at 19:17

## 2022-04-22 RX ADMIN — SODIUM CHLORIDE, PRESERVATIVE FREE 10 ML: 5 INJECTION INTRAVENOUS at 20:43

## 2022-04-22 RX ADMIN — DOCUSATE SODIUM 50 MG AND SENNOSIDES 8.6 MG 2 TABLET: 8.6; 5 TABLET, FILM COATED ORAL at 18:37

## 2022-04-22 RX ADMIN — FUROSEMIDE 20 MG: 10 INJECTION, SOLUTION INTRAMUSCULAR; INTRAVENOUS at 18:37

## 2022-04-22 RX ADMIN — ATORVASTATIN CALCIUM 10 MG: 10 TABLET, FILM COATED ORAL at 09:17

## 2022-04-22 RX ADMIN — FUROSEMIDE 40 MG: 10 INJECTION, SOLUTION INTRAMUSCULAR; INTRAVENOUS at 02:29

## 2022-04-22 ASSESSMENT — PAIN DESCRIPTION - ORIENTATION
ORIENTATION: MID
ORIENTATION: MID

## 2022-04-22 ASSESSMENT — ENCOUNTER SYMPTOMS
RESPIRATORY NEGATIVE: 1
EYES NEGATIVE: 1
COUGH: 0
NAUSEA: 1
SHORTNESS OF BREATH: 1

## 2022-04-22 ASSESSMENT — PAIN DESCRIPTION - LOCATION
LOCATION: CHEST
LOCATION: CHEST

## 2022-04-22 ASSESSMENT — PAIN SCALES - GENERAL
PAINLEVEL_OUTOF10: 0
PAINLEVEL_OUTOF10: 0
PAINLEVEL_OUTOF10: 3
PAINLEVEL_OUTOF10: 0
PAINLEVEL_OUTOF10: 2

## 2022-04-22 ASSESSMENT — PAIN DESCRIPTION - FREQUENCY: FREQUENCY: CONTINUOUS

## 2022-04-22 ASSESSMENT — PAIN DESCRIPTION - ONSET: ONSET: SUDDEN

## 2022-04-22 ASSESSMENT — PAIN - FUNCTIONAL ASSESSMENT
PAIN_FUNCTIONAL_ASSESSMENT: ACTIVITIES ARE NOT PREVENTED
PAIN_FUNCTIONAL_ASSESSMENT: 0-10

## 2022-04-22 ASSESSMENT — PAIN DESCRIPTION - DESCRIPTORS
DESCRIPTORS: ACHING;JABBING
DESCRIPTORS: ACHING

## 2022-04-22 ASSESSMENT — PAIN DESCRIPTION - PAIN TYPE
TYPE: ACUTE PAIN
TYPE: ACUTE PAIN

## 2022-04-22 NOTE — PROGRESS NOTES
DOSE ADJUSTMENT MADE PER P/T PROTOCOL    PREVIOUS ORDER:  Lovenox 30mg SQ twice daily    Patient weight:   Wt Readings from Last 1 Encounters:   04/22/22 185 lb 4.8 oz (84.1 kg)       Estimated Creatinine Clearance: 53 mL/min (based on SCr of 0.9 mg/dL). Recent Labs     04/22/22  0030   BUN 17   CREATININE 0.9          TABLE 1.   ENOXAPARIN ROUTINE PROPHYLAXIS DOSING (Medically ill, routine surgery)   Patient Weight (kg)     50.9 and below 51 - 100.9 101 - 150.9 151 - 174.9 175 or greater         Estimated CrCl  (ml/min) 30 or greater   30 mg SUBQ daily   40 mg SUBQ daily 30 mg SUBQ BID  40 mg SUBQ BID 60mg SUBQ BID      15-29 UFH 5000 units SUBQ BID   30 mg SUBQ daily 30 mg SUBQ daily 40 mg SUBQ daily   60 mg SUBQ daily      Less than 15 or Dialysis UFH 5000 units SUBQ BID   UFH 5000 units SUBQ TID UFH 7500 units SUBQ TID     NEW ADJUSTED ORDER:  Lovenox 40mg SQ daily    Rodney Wang [id number] [facility]  4/22/2022 11:47 AM

## 2022-04-22 NOTE — PLAN OF CARE
Problem: Discharge Planning  Goal: Discharge to home or other facility with appropriate resources  Outcome: Progressing     Problem: Pain  Goal: Verbalizes/displays adequate comfort level or baseline comfort level  Outcome: Progressing     Problem: Safety - Adult  Goal: Free from fall injury  Outcome: Progressing     Problem: ABCDS Injury Assessment  Goal: Absence of physical injury  Outcome: Progressing

## 2022-04-22 NOTE — CARE COORDINATION
CM met with the patient for discharge planning. Patient lives at [facility] (assisted living), has insurance with Rx coverage & PCP, requires minor assistance with ADL's, and no longer drives. Patient stated that she uses a walker at home and does not require oxygen. Patient plans to return to [facility] and stated that her family should be able to provide transportation back to [facility] upon discharge. CM available if needs arise.

## 2022-04-22 NOTE — PROGRESS NOTES
LOVENOX PROPHYLAXIS EVALUATION    Wt Readings from Last 3 Encounters:   04/22/22 225 lb (102.1 kg)   03/11/22 190 lb (86.2 kg)   09/17/21 183 lb 6.4 oz (83.2 kg)       Estimated Creatinine Clearance: 59 mL/min (based on SCr of 0.9 mg/dL).   Recent Labs     04/22/22  0030   BUN 17   CREATININE 0.9      HGB 10.8*   HCT 34.4*       Weight Range (kg): 101-150.9 kg    CRCL = 30 or greater     50.9 kg   and below     .9  kg   101-150.9 kg   151-174.9  kg   175 kg  or greater     30mg subq  daily     40mg subq daily    (or 30mg subq BID orthopedic)     30mg subq  BID   40mg subq  BID   60mg subq BID       Per P/T protocol for appropriate subq anticoagulation by weight and CRCL change to:  Enoxaparin 30mg subq BID    BIMAL Singleton [facility]  3:12 AM  04/22/22

## 2022-04-22 NOTE — PROGRESS NOTES
The patient's daily home medications were updated in the chart's Home Medication List after being reviewed with:   [] the patient. [x] the patient's facility MAR. [] the patient's family or caregiver.  [] the patient's pharmacy.     Per Constantin Hernandez LPN

## 2022-04-22 NOTE — H&P
History and Physical   This patient was seen and examined autonomously however the patients care plan was reviewed with call hospitalist attending physician Dr. Ayla Back prior to implementation. Name:  Nneka Client /Age/Sex: 1935  (81 y.o. female)   MRN & CSN:  [id number] & [id number] Admission Date/Time: 2022 12:06 AM   Location:   PCP: VINNY Johnson           Assessment and Plan:   Nneka Client is a 81 y.o. female who presents with  Chest Pain (MIDSTERNAL CHEST PAIN WOKE HER UP OUT OF HER SLEEP. States now is an \"aggravating\" 3/10, worse with movement. No radiation. No nausea, SOB or diaphoresis. Pt is from [facility].  )      Present on Admission:  **None**     Acute on chronic HFpEF  To r/o MI follow trop trends first one NEG DDimmer pending  EKG:Ventricular-paced rhythm with arifcat repeat EKG in AM or PRN if pain comes back. Last ECHO 20:Ejection fraction is visually estimated at 50-55%. Mild left ventricular hypertrophy. Grade I diastolic dysfunction. New Echo ordered  Cardio cons/ cardiac monitoring  Lasix IV40mg BID with daily I&o and weights  BNP pending  EKG:vpaced rythm  Chest xray:  Impression   Cardiomegaly with pulmonary vascular congestion. Hx of complete heart block: has a PPM  Hx of DM: not on any meds, A1c pending  Hx of HTN: cont to follow vitals trends a po home meds  Hx of GERD : con home meds   Hx of asthma: con home meds, oxygen as needed with spot check pulse ox. Hx of CAD con home meds see above  Constipation : con home meds  Chronic anemia: iron and fe ordered. Patient case discussed with ED provider    Patient would like to be a full code, she would like her medical POA to be her son who lives in [place] [address]. Patient currently lives in nursing home.     Diet No diet orders on file   DVT Prophylaxis [x] Lovenox, []  Heparin, [] SCDs, [] Ambulation  [] Long term AC   GI Prophylaxis [x] PPI,  [] H2 Blocker,  [] Carafate,  [] Diet/Tube Feeds   Code Status Full code         -Patient assessment and plan discussed and reviewed with admitting physician: No admitting provider for patient encounter. Rene Casey History of Present Illness:     Chief Complaint: Chest Pain (MIDSTERNAL CHEST PAIN WOKE HER UP OUT OF HER SLEEP. States now is an \"aggravating\" 3/10, worse with movement. No radiation. No nausea, SOB or diaphoresis. Pt is from [facility].  )      Nyoka Bence is a 81 y.o. female who presents with mid sternal chest pain that woke her up out of her sleep. Denied radiation of pain or SOB, N/V/D fevers abdominal pain, burring or freq urination, constipation or lower leg swelling. Patient reports after given breathing treatment and lasix chest pain has gone away. She reports that she has not take any of her home medications in a long time. After showing her signed MAR from nursing [place] patient reports that she was confused and they have been giving her medicine to her. She does report ocasional confusion but is able to answer complex questions like drug allergies and is aware of current location, situation, time ect. She is not slurring her speech no extremity drifts, denied dizziness or HA. She denied illegal drug use, smoking or drinking alcohol. History obtained from patient      Ten point ROS: .Review of Systems   Constitutional: Negative. HENT: Negative. Eyes: Negative. Respiratory: Negative. Cardiovascular: Positive for chest pain. Genitourinary: Negative. Neurological: Negative. Objective:   No intake or output data in the 24 hours ending 04/22/22 0219     Vitals:   Vitals:    04/22/22 0012 04/22/22 0036   Pulse: 62    Resp: 17 15   Temp: 98 °F (36.7 °C)    TempSrc: Oral    SpO2: 91% 90%   Weight: 225 lb (102.1 kg)    Height: 5' 9.5\" (1.765 m)        Physical Exam: 04/22/22     GEN -Awake  Pale appearing female, sitting upright in bed , NAD. obese body habitus. Appears given age.   EYES -No scleral erythema, discharge, or conjunctivitis. HENT -MM are moist. NECK -Supple, no apparent thyromegaly or masses. RESP -CTA, no wheezes, rales or rhonchi. Symmetric chest movement while on room air. C/V -S1/S2 auscultated. No peripheral edema. GI -Abdomen is soft non distended and without significant TTP. + BS. No masses or guarding. Rectal exam deferred. No HSM   -No CVA/ flank tenderness. MS -No gross joint deformities. SKIN -Normal coloration, warm, dry. NEURO-Cranial nerves appear grossly intact, normal speech, no lateralizing weakness. PSYC-Awake, alert, oriented x 4 person, place, time, situation,  Appropriate affect. Past Medical History:      Past Medical History:   Diagnosis Date    Arthrofibrosis of total knee arthroplasty ([facility] [address]     bilateral    Asthma     CAD (coronary artery disease)     CHF [facility]congestive heart failure) ([facility])     Constipation     DDD (degenerative disc disease), cervical     Diabetes mellitus ([facility] [address]     Femur fracture, right ([facility])     distal femur    GERD (gastroesophageal reflux disease)     Hypertension     Venous stasis        Past Surgical  History:    has a past surgical history that includes knee surgery; Appendectomy; Pacemaker insertion; pacemaker placement (12/27/2020); and Pacemaker insertion (N/A, 12/27/2020). Social History:    FAM HX: Reviewed and noncontributory   family history is not on file.     Soc HX:   Social History     Socioeconomic History    Marital status:      Spouse name: None    Number of children: 3    Years of education: None    Highest education level: None   Occupational History    None   Tobacco Use    Smoking status: Never Smoker    Smokeless tobacco: Never Used   Vaping Use    Vaping Use: Never used   Substance and Sexual Activity    Alcohol use: Not Currently    Drug use: Never    Sexual activity: Not Currently   Other Topics Concern    None   Social History Narrative    None     Social Determinants of Health     Financial Resource Strain:     Difficulty of Paying Living Expenses: Not on file   Food Insecurity:     Worried About Running Out of Food in the Last Year: Not on file    Bj of Food in the Last Year: Not on file   Transportation Needs:     Lack of Transportation (Medical): Not on file    Lack of Transportation (Non-Medical): Not on file   Physical Activity:     Days of Exercise per Week: Not on file    Minutes of Exercise per Session: Not on file   Stress:     Feeling of Stress : Not on file   Social Connections:     Frequency of Communication with Friends and Family: Not on file    Frequency of Social Gatherings with Friends and Family: Not on file    Attends Yarsanism Services: Not on file    Active Member of [address] [place] [address] [employer] or Organizations: Not on file    Attends Club or Organization Meetings: Not on file    Marital Status: Not on file   Intimate Partner Violence:     Fear of Current or Ex-Partner: Not on file    Emotionally Abused: Not on file    Physically Abused: Not on file    Sexually Abused: Not on file   Housing Stability:     Unable to Pay for Housing in the Last Year: Not on file    Number of [place] in the Last Year: Not on file    Unstable Housing in the Last Year: Not on file     TOBACCO:   reports that she has never smoked. She has never used smokeless tobacco.  ETOH:   reports previous alcohol use. Drugs:  reports no history of drug use. Allergies: Allergies   Allergen Reactions    Lisinopril     Pcn [Penicillins]     Sulfa Antibiotics        Home Medications:     Prior to Admission medications    Medication Sig Start Date End Date Taking?  Authorizing Provider   diphenhydrAMINE (BENADRYL) 25 MG tablet Take 25 mg by mouth every 6 hours as needed for Itching    Historical Provider, MD   vitamin E 180 MG (400 UNIT) CAPS capsule Take 180 mg by mouth daily    Historical Provider, MD   vitamin B-12 (CYANOCOBALAMIN) 1000 MCG tablet Take 1,000 mcg by mouth daily  Patient not taking: Reported on 3/11/2022    Historical Provider, MD   donepezil (ARICEPT) 5 MG tablet Take 5 mg by mouth nightly    Historical Provider, MD   furosemide (LASIX) 20 MG tablet Take 1 tablet by mouth daily 3/19/21   Moses Torrez, APRN - CNP   hydroCHLOROthiazide (MICROZIDE) 12.5 MG capsule Take 1 capsule by mouth daily 3/17/21   Alex Grijalva MD   ibuprofen (ADVIL;MOTRIN) 600 MG tablet Take 600 mg by mouth every 8 hours as needed for Pain    Historical Provider, MD   ammonium lactate (AMLACTIN) 12 % cream Apply topically as needed for Dry Skin Apply topically as needed.   Patient not taking: Reported on 3/11/2022    Historical Provider, MD   amLODIPine (NORVASC) 5 MG tablet Take 5 mg by mouth daily    Historical Provider, MD   sertraline (ZOLOFT) 25 MG tablet Take 1 tablet by mouth nightly  2/6/21   Historical Provider, MD   ferrous sulfate (IRON 325) 325 (65 Fe) MG tablet Take 1 tablet by mouth every other day 12/29/20   Darline Weiss MD   sennosides-docusate sodium (SENOKOT-S) 8.6-50 MG tablet Take 2 tablets by mouth daily as needed for Constipation  Patient taking differently: Take 2 tablets by mouth every evening  12/29/20   Darline Weiss MD   acetaminophen (TYLENOL) 500 MG tablet Take 2 tablets by mouth every 8 hours for 7 days After January 5, 2021, change this medication to prn 12/29/20 9/17/21  Darline Weiss MD   aspirin 81 MG chewable tablet Take 81 mg by mouth daily    Historical Provider, MD   atorvastatin (LIPITOR) 10 MG tablet Take 10 mg by mouth daily    Historical Provider, MD   budesonide-formoterol (SYMBICORT) 160-4.5 MCG/ACT AERO Inhale 2 puffs into the lungs 2 times daily    Historical Provider, MD   losartan (COZAAR) 100 MG tablet Take 100 mg by mouth daily    Historical Provider, MD   pantoprazole (PROTONIX) 20 MG tablet Take 20 mg by mouth daily    Historical Provider, MD   albuterol sulfate HFA (VENTOLIN HFA) 108 (90 Base) MCG/ACT inhaler Inhale 2 puffs into the lungs every 6 hours as needed for Wheezing    Historical Provider, MD   vitamin D (CHOLECALCIFEROL) 25 MCG (1000 UT) TABS tablet Take 1,000 Units by mouth daily    Historical Provider, MD         Medications:   Medications:    furosemide  40 mg IntraVENous Once      Infusions:   PRN Meds:      Data:     Laboratory this visit:  Reviewed  Recent Labs     04/22/22 0030   WBC 8.8   HGB 10.8*   HCT 34.4*         Recent Labs     04/22/22 0030      K 3.9      CO2 27   BUN 17   CREATININE 0.9     No results for input(s): AST, ALT, ALB, BILIDIR, BILITOT, ALKPHOS in the last 72 hours. No results for input(s): INR in the last 72 hours. Radiology this visit:  Reviewed. XR CHEST PORTABLE    Result Date: 4/22/2022  EXAMINATION: ONE XRAY VIEW OF THE CHEST 4/22/2022 12:21 am COMPARISON: Chest radiograph dated March 15, 2021 HISTORY: ORDERING SYSTEM PROVIDED HISTORY: chest pain TECHNOLOGIST PROVIDED HISTORY: Reason for exam:->chest pain Reason for Exam: sob FINDINGS: Cardiomegaly is noted. Pulmonary vascular congestion is seen. A left-sided intracardiac device is noted. There is no definite focal consolidation or large pleural effusion. Cardiomegaly with pulmonary vascular congestion.            EKG this visit:  Reviewed         Electronically signed by CESAR Johnson CNP on 4/22/2022 at 2:19 AM

## 2022-04-22 NOTE — ED NOTES
Nurse from [facility] where the patient resides called to give report, patient woke from her sleep with CP /88, P-60, O2 88% on RA, Patient is a full code.        Alicja Simmons RN  04/22/22 [id number]

## 2022-04-22 NOTE — PROGRESS NOTES
Hospitalist Progress Note      Name:  Radha Dyer /Age/Sex: 1935  (81 y.o. female)   MRN & CSN:  [id number] & [id number] Admission Date/Time: 2022 12:06 AM   Location:  [id number]/[id number] PCP: Shakeel Serrano [address] [place] [address] Day: 1                                               Attending Physician Dr Selwyn Huston and Plan:   Radha Dyer is a 81 y.o.  female  who presents with Other heart failure ([address]    Acute HFpEF. Does not appear decompensated. BNP 2.555. CXR: () cardiomegaly with pulmonary vascular congestion. Admit to [facility]   IV Lasix   Telemetry monitoring troponin trend   Daily weights/strict I&Os/Low sodium diet- 1800 ml fluid restriction   TTE () EF 50%, moderate LVH, septal bounce, G3 DD, moderately dilated atrium, moderate TR, mild MS     COPD-appears stable   Continue bronchodilators/pulmonary hygiene    HTN-continue Cozaar/Norvasc   Somewhat uncontrolled trends noted overnight    Depression-continue Zoloft    Cognitive dysfunction-continue Aricept      Anemia, chronic normocytic. H/H 10.8/34.4   Appears at baseline   Suspect KATHERINE. Continue iron supplementation      Diet ADULT DIET; Regular; 3 carb choices (45 gm/meal); Low Fat/Low Chol/High Fiber/IVONNE; Low Sodium (2 gm); 1800 ml   DVT Prophylaxis [x] Lovenox, []  Heparin, [] SCDs, [] Ambulation   GI Prophylaxis [x] PPI,  [] H2 Blocker,  [] Carafate,  [] Diet/Tube Feeds   Code Status Full Code     -Patient assessment and plan discussed with supervising physician-  Subjective 2022     Radha Dyer is a 81 y.o.  female, who presented with chest pain/shortness of breath  . Denies new concerns. She does not recall symptoms that brought her to the emergency room. When asked if she had chest pain upon arrival she denies symptoms. Denies shortness of breath.   Very pleasant describes her previous residence at home by and that she moved back to assisted living to be closer to family  Ten point ROS reviewed negative, unless as noted above    Objective:   No intake or output data in the 24 hours ending 04/22/22 1430   Vitals:   Vitals:    04/22/22 0311 04/22/22 0345 04/22/22 0735 04/22/22 0830   BP: (!) 154/74 (!) 157/87 (!) 175/93    Pulse: 63 67 64    Resp: 22 20 16    Temp:   97.1 °F (36.2 °C)    TempSrc:   Oral    SpO2: 92% 94% 94% 94%   Weight:  185 lb 4.8 oz (84.1 kg)     Height:  5' 9.5\" (1.765 m)       Physical Exam: 04/22/22     Gen:  awake, alert, cooperative, no apparent distress normal body habitus  Head/Eyes:  Normocephalic atraumatic, EOMI   NECK:   symmetrical, trachea midline  LUNGS: Normal Effort/ symmetry movement   CARDIOVASCULAR:  Normal rate Tele SR. Bilateral lower extremity edema  ABDOMEN: Non tender, non distended, no HSM noted. MUSCULOSKELETAL: no gross deformities  NEUROLOGIC: Alert and Oriented,  Cranial nerves II-XII are grossly intact.    SKIN:  no bruising or bleeding, normal skin color,  no redness    Medications:   Medications:    amLODIPine  5 mg Oral Daily    aspirin  81 mg Oral Daily    atorvastatin  10 mg Oral Daily    mometasone-formoterol  2 puff Inhalation BID    donepezil  5 mg Oral Nightly    ferrous sulfate  325 mg Oral Every Other Day    losartan  100 mg Oral Daily    pantoprazole  20 mg Oral Daily    sennosides-docusate sodium  2 tablet Oral QPM    sertraline  25 mg Oral Nightly    Vitamin D  1,000 Units Oral Daily    vitamin E  400 Units Oral Daily    sodium chloride flush  5-40 mL IntraVENous 2 times per day    furosemide  20 mg IntraVENous BID    [START ON 4/23/2022] enoxaparin  40 mg SubCUTAneous Daily      Infusions:    sodium chloride       PRN Meds: albuterol sulfate HFA, 2 puff, Q6H PRN  diphenhydrAMINE, 25 mg, Q6H PRN  sodium chloride flush, 5-40 mL, PRN  sodium chloride, 25 mL, PRN  polyethylene glycol, 17 g, Daily PRN  acetaminophen, 650 mg, Q6H PRN   Or  acetaminophen, 650 mg, Q6H PRN  potassium chloride, 40 mEq, PRN   Or  potassium alternative oral replacement, 40 mEq, PRN   Or  potassium chloride, 10 mEq, PRN  magnesium sulfate, 2,000 mg, PRN        LABS  CBC   Recent Labs     04/22/22  0030   WBC 8.8   HGB 10.8*   HCT 34.4*         RENAL  Recent Labs     04/22/22  0030      K 3.9      CO2 27   BUN 17   CREATININE 0.9     LFT'S  No results for input(s): AST, ALT, ALB, BILIDIR, BILITOT, ALKPHOS in the last 72 hours. COAG  No results for input(s): INR in the last 72 hours. CARDIAC ENZYMES  No results for input(s): CKTOTAL, CKMB, CKMBINDEX, TROPONINI in the last 72 hours. Radiology this visit:  Reviewed. Echocardiogram complete 2D with doppler with color    Result Date: 4/22/2022  Transthoracic Echocardiography Report (TTE)  Demographics   Patient Name       Sal Mendenhall Date of Study       04/22/2022   Date of Birth      1935        Gender              Female   Age                81 year(s)        Race                   Patient Number     [id number]        Room Number         009   Visit Number       [id number]   Corporate ID       [id number]   Accession Number   [id number]        JASON BarbozaT   Ordering Physician                   Interpreting        Chen Ozuna MD                                       Physician  Procedure Type of Study   TTE procedure:ECHOCARDIOGRAM COMPLETE 2D W DOPPLER W COLOR. Procedure Date Date: 04/22/2022 Start: 09:53 AM Study Location: Portable Technical Quality: Limited visualization due to body habitus. Indications:Chest pain. Patient Status: Routine Height: 69 inches Weight: 225 pounds BSA: 2.17 m2 BMI: 33.23 kg/m2 HR: 66 bpm BP: 175/93 mmHg  Conclusions   Summary  Left ventricular systolic function is normal.  Ejection fraction is visually estimated at 50%. Moderate left ventricular hypertrophy. Septal bounce noted.  ( paradoxical motion due to bundle branch block) Grade III diastolic dysfunction. Moderately dilated left atrium. PPM wiring visualized in right atrium. Large mitral annular calcification; mild mitral stenosis. Mean P mmHg. Mild to moderate tricuspid regurgitation; RVSP: 45 mmHg. No evidence of any pericardial effusion. Signature   ------------------------------------------------------------------  Electronically signed by Franck Dean MD (Interpreting  physician) on 2022 at 01:12 PM  ------------------------------------------------------------------   Findings   Left Ventricle  Left ventricular systolic function is normal.  Ejection fraction is visually estimated at 50%. Moderate left ventricular hypertrophy. Septal bounce noted. Grade III diastolic dysfunction. Left ventricle size is normal.   Left Atrium  Moderately dilated left atrium. Right Atrium  PPM wiring visualized in right atrium. Right Ventricle  PPM wiring visualized within the right ventricle. Aortic Valve  Lambl's excresence noted on the aortic valve leaflet tips. Trace aortic  regurgitation. Mitral Valve  Large mitral annular calcification; mild mitral stenosis. Mean P mmHg. Tricuspid Valve  Mild to moderate tricuspid regurgitation; RVSP: 45 mmHg. Pulmonic Valve  Trace pulmonic regurgitation present. Pericardial Effusion  No evidence of any pericardial effusion. Pleural Effusion  No evidence of pleural effusion. Miscellaneous  IVC and abdominal aorta are within normal limits.   M-Mode/2D Measurements & Calculations   LV Diastolic Dimension:   LV Systolic Dimension:   LA Dimension: 5.2 cmAO  3.4 cm                    2.5 cm                   Root Dimension: 3.6 cm  LV FS:26.5 %              LV Volume Diastolic:  LV PW Diastolic: 1.7 cm   83.2 ml  LV PW Systolic: 2 cm      LV Volume Systolic: 31.5  Septum Diastolic: 1.7 cm  ml                       RV Diastolic Dimension:  Septum Systolic: 1.8 cm   LV EDV/LV EDV Index:     3.8 cm  CO: 5.77 l/min 39.3 ml/18 m2LV ESV/LV  CI: 2.66 l/m*m2           ESV Index: 15.6 ml/7 m2  LA/Aorta: 1.44                            EF Calculated (A4C):                            60.3 %                            EF Calculated (2D): 53 %                             LVOT: 2.2 cm  Doppler Measurements & Calculations   MV Peak E-Wave: 169     AV Peak Velocity: 173 cm/s  LVOT Peak Velocity: 127  cm/s                    AV Peak Gradient: 11.97     cm/s  MV Peak A-Wave: 84.6    mmHg                        LVOT Mean Velocity: 83.2  cm/s                    AV Mean Velocity: 127 cm/s  cm/s  MV E/A Ratio: 2         AV Mean Gradient: 7 mmHg    LVOT Peak Gradient: 6  MV Peak Gradient: 11.42 AV VTI: 36.2 cm             mmHgLVOT Mean Gradient:  mmHg                    AV Area (Continuity):2.41   3 mmHg  MV Mean Gradient: 5     cm2                         Estimated RVSP: 45 mmHg  mmHg                                                Estimated RAP:3 mmHg  MV Mean Velocity: 98.5  LVOT VTI: 23 cm  cm/s  MV P1/2t: 97 msec       Estimated PASP: 37.81 mmHg  TR Velocity:295 cm/s  MVA by PHT:2.27 cm2                                 TR Gradient:34.81 mmHg  MV Area (continuity):  1.8 cm2  MV E' Septal Velocity:  4.45 cm/s   MV E/E' septal: 37.98      XR CHEST PORTABLE    Result Date: 4/22/2022  EXAMINATION: ONE XRAY VIEW OF THE CHEST 4/22/2022 12:21 am COMPARISON: Chest radiograph dated March 15, 2021 HISTORY: ORDERING SYSTEM PROVIDED HISTORY: chest pain TECHNOLOGIST PROVIDED HISTORY: Reason for exam:->chest pain Reason for Exam: sob FINDINGS: Cardiomegaly is noted. Pulmonary vascular congestion is seen. A left-sided intracardiac device is noted. There is no definite focal consolidation or large pleural effusion. Cardiomegaly with pulmonary vascular congestion.              Electronically signed by CESAR Winslow CNP on 4/22/2022 at 2:30 PM

## 2022-04-22 NOTE — PLAN OF CARE
Educated pt on CHF  Problem: Discharge Planning  Goal: Discharge to home or other facility with appropriate resources  4/22/2022 0926 by Joellen Mcdaniels RN  Outcome: Progressing  4/22/2022 0425 by Anthony Breaux RN  Outcome: Progressing     Problem: Pain  Goal: Verbalizes/displays adequate comfort level or baseline comfort level  4/22/2022 0926 by Joellen Mcdaniels RN  Outcome: Progressing  4/22/2022 0425 by Anthony Breaux RN  Outcome: Progressing     Problem: Safety - Adult  Goal: Free from fall injury  4/22/2022 0926 by Joellen Mcdaniels RN  Outcome: Progressing  4/22/2022 0425 by Anthony Breaux RN  Outcome: Progressing     Problem: ABCDS Injury Assessment  Goal: Absence of physical injury  4/22/2022 1071 by Joellen Mcdaniels RN  Outcome: Progressing  4/22/2022 0425 by Anthony Breaux RN  Outcome: Progressing

## 2022-04-22 NOTE — ED PROVIDER NOTES
The history is provided by the patient. Chest Pain  Pain location:  Substernal area  Pain quality: pressure    Pain severity:  Moderate  Onset quality:  Sudden  Timing:  Constant  Progression:  Resolved (Her discomfort resolved upon arrival by EMS)  Chronicity:  New  Context comment:  Midsternal chest pain which awoke her from sleep. States now is an \"aggravating\" 3/10, worse with movement. Shortness of breath when she lies flat and she feels like she is being smothered  Relieved by:  Nothing  Worsened by:  Exertion, deep breathing and certain positions  Ineffective treatments:  None tried  Associated symptoms: anxiety, nausea and shortness of breath    Associated symptoms: no altered mental status, no cough, no diaphoresis, no near-syncope and no palpitations        Review of Systems   Constitutional: Negative. Negative for diaphoresis. HENT: Negative. Eyes: Negative. Respiratory: Positive for shortness of breath. Negative for cough. Cardiovascular: Positive for chest pain. Negative for palpitations and near-syncope. Gastrointestinal: Positive for nausea. Genitourinary: Negative. Musculoskeletal: Negative. Skin: Negative. Neurological: Negative. All other systems reviewed and are negative. History reviewed. No pertinent family history.   Social History     Socioeconomic History    Marital status:      Spouse name: Not on file    Number of children: 3    Years of education: Not on file    Highest education level: Not on file   Occupational History    Not on file   Tobacco Use    Smoking status: Never Smoker    Smokeless tobacco: Never Used   Vaping Use    Vaping Use: Never used   Substance and Sexual Activity    Alcohol use: Not Currently    Drug use: Never    Sexual activity: Not Currently   Other Topics Concern    Not on file   Social History Narrative    Not on file     Social Determinants of Health     Financial Resource Strain:     Difficulty of Paying Living Expenses: Not on file   Food Insecurity:     Worried About [address] in the Last Year: Not on file    Bj of Food in the Last Year: Not on file   Transportation Needs:     Lack of Transportation (Medical): Not on file    Lack of Transportation (Non-Medical):  Not on file   Physical Activity:     Days of Exercise per Week: Not on file    Minutes of Exercise per Session: Not on file   Stress:     Feeling of Stress : Not on file   Social Connections:     Frequency of Communication with Friends and Family: Not on file    Frequency of Social Gatherings with Friends and Family: Not on file    Attends [employer] Services: Not on file    Active Member of [address] [place] [address] or Organizations: Not on file    Attends Club or Organization Meetings: Not on file    Marital Status: Not on file   Intimate Partner Violence:     Fear of Current or Ex-Partner: Not on file    Emotionally Abused: Not on file    Physically Abused: Not on file    Sexually Abused: Not on file   Housing Stability:     Unable to Pay for Housing in the Last Year: Not on file    Number of [place] in the Last Year: Not on file    Unstable Housing in the Last Year: Not on file     Past Surgical History:   Procedure Laterality Date    APPENDECTOMY      KNEE SURGERY      PACEMAKER INSERTION      AV sequential pacer    PACEMAKER INSERTION N/A 12/27/2020    PACEMAKER INSERTION PERMANENT performed by Otis Lopez [facility] at [facility]  12/27/2020    AV sequential     Past Medical History:   Diagnosis Date    [facility]throfibrosis of total knee arthroplasty ([facility]     bilateral    Asthma     CAD (coronary artery disease)     [facility] (congestive heart failure) ([facility]     Constipation     DDD (degenerative disc disease), cervical     Diabetes mellitus ([facility]     Femur fracture, right ([facility]     distal femur    GERD (gastroesophageal reflux disease)     Hypertension     Venous stasis      Allergies   Allergen Reactions    Lisinopril     Pcn [Penicillins]     Sulfa Antibiotics      Prior to Admission medications    Medication Sig Start Date End Date Taking? Authorizing Provider   diphenhydrAMINE (BENADRYL) 25 MG tablet Take 25 mg by mouth every 6 hours as needed for Itching    Historical Provider, MD   vitamin E 180 MG (400 UNIT) CAPS capsule Take 180 mg by mouth daily    Historical Provider, MD   vitamin B-12 (CYANOCOBALAMIN) 1000 MCG tablet Take 1,000 mcg by mouth daily  Patient not taking: Reported on 3/11/2022    Historical Provider, MD   donepezil (ARICEPT) 5 MG tablet Take 5 mg by mouth nightly    Historical Provider, MD   furosemide (LASIX) 20 MG tablet Take 1 tablet by mouth daily 3/19/21   Moses Torrez, APRN - FRANCIS   hydroCHLOROthiazide (MICROZIDE) 12.5 MG capsule Take 1 capsule by mouth daily 3/17/21   Nel Salazar MD   ibuprofen (ADVIL;MOTRIN) 600 MG tablet Take 600 mg by mouth every 8 hours as needed for Pain    Historical Provider, MD   ammonium lactate (AMLACTIN) 12 % cream Apply topically as needed for Dry Skin Apply topically as needed.   Patient not taking: Reported on 3/11/2022    Historical Provider, MD   amLODIPine (NORVASC) 5 MG tablet Take 5 mg by mouth daily    Historical Provider, MD   sertraline (ZOLOFT) 25 MG tablet Take 1 tablet by mouth nightly  2/6/21   Historical Provider, MD   ferrous sulfate (IRON 325) 325 (65 Fe) MG tablet Take 1 tablet by mouth every other day 12/29/20   Tara Sandifer, MD   sennosides-docusate sodium (SENOKOT-S) 8.6-50 MG tablet Take 2 tablets by mouth daily as needed for Constipation  Patient taking differently: Take 2 tablets by mouth every evening  12/29/20   Tara Sandifer, MD   acetaminophen (TYLENOL) 500 MG tablet Take 2 tablets by mouth every 8 hours for 7 days After January 5, 2021, change this medication to prn 12/29/20 9/17/21  Tara Sandifer, MD   aspirin 81 MG chewable tablet Take 81 mg by mouth daily    Historical Provider, MD atorvastatin (LIPITOR) 10 MG tablet Take 10 mg by mouth daily    Historical Provider, MD   budesonide-formoterol (SYMBICORT) 160-4.5 MCG/ACT AERO Inhale 2 puffs into the lungs 2 times daily    Historical Provider, MD   losartan (COZAAR) 100 MG tablet Take 100 mg by mouth daily    Historical Provider, MD   pantoprazole (PROTONIX) 20 MG tablet Take 20 mg by mouth daily    Historical Provider, MD   albuterol sulfate HFA (VENTOLIN HFA) 108 (90 Base) MCG/ACT inhaler Inhale 2 puffs into the lungs every 6 hours as needed for Wheezing    Historical Provider, MD   vitamin D (CHOLECALCIFEROL) 25 MCG (1000 UT) TABS tablet Take 1,000 Units by mouth daily    Historical Provider, MD       Pulse 62   Temp 98 °F (36.7 °C) (Oral)   Resp 15   Ht 5' 9.5\" (1.765 m)   Wt 225 lb (102.1 kg)   SpO2 90%   BMI 32.75 kg/m²     Physical Exam  Vitals and nursing note reviewed. Constitutional:       Appearance: She is well-developed. HENT:      Head: Normocephalic and atraumatic. Right Ear: External ear normal.      Left Ear: External ear normal.      Nose: Nose normal.   Eyes:      Conjunctiva/sclera: Conjunctivae normal.      Pupils: Pupils are equal, round, and reactive to light. Cardiovascular:      Rate and Rhythm: Normal rate and regular rhythm. Heart sounds: Murmur heard. Pulmonary:      Effort: Pulmonary effort is normal. No respiratory distress. Breath sounds: Rales present. Abdominal:      General: Bowel sounds are normal.      Palpations: Abdomen is soft. Musculoskeletal:         General: No tenderness. Cervical back: Normal range of motion and neck supple. Skin:     General: Skin is warm and dry. Neurological:      Mental Status: She is alert and oriented to person, place, and time. Mental status is at baseline. GCS: GCS eye subscore is 4. GCS verbal subscore is 5. GCS motor subscore is 6. Psychiatric:         Behavior: Behavior normal.         Thought Content:  Thought content normal.         Judgment: Judgment normal.         MDM:    Labs Reviewed   CBC WITH AUTO DIFFERENTIAL - Abnormal; Notable for the following components:       Result Value    RBC 3.54 (*)     Hemoglobin 10.8 (*)     Hematocrit 34.4 (*)     MCHC 31.4 (*)     Monocytes % 10.9 (*)     Eosinophils % 4.5 (*)     Immature Neutrophil % 1.0 (*)     All other components within normal limits   BASIC METABOLIC PANEL - Abnormal; Notable for the following components:    GFR Non- 60 (*)     All other components within normal limits   TROPONIN   BRAIN NATRIURETIC PEPTIDE       XR CHEST PORTABLE   Final Result   Cardiomegaly with pulmonary vascular congestion. Ventricular-paced rhythm   Abnormal ECG   When compared with ECG of 16-MAR-2021 06:04,   No significant change was found    Patient chest pain resolved but she is still short of breath when she lies flat. Chest xray shows CHF. She still has oxygen requirement of 2 liters. She will require admission. Lasix given  My typical dicussion, presentation, and considerations for this patients' chief complaint, diagnosis, differential diagnosis, medications, medication use,  medication safety and medication interactions have been explained and outlined to this patient for this patient encounter. I have stressed need for follow up and reexamination for this encounter and I have contacted hospitalist for admission         Final Impression    1. Chest pain, unspecified type    2.  Other congestive heart failure ([facility]              Ana Silver, DO  04/22/22 2050 [facility],   04/22/22 2116

## 2022-04-22 NOTE — PROGRESS NOTES
Pt's admission skin assessment done by this Rn and Dat Gonzalez Rn. Pt has a large bruise to her coccyx and bilateral buttocks. It is purple and red in color. Pt states she obtained this a week ago from a fall. No open skin areas noted.

## 2022-04-23 VITALS
HEART RATE: 63 BPM | RESPIRATION RATE: 16 BRPM | WEIGHT: 184.3 LBS | OXYGEN SATURATION: 95 % | TEMPERATURE: 98.3 F | SYSTOLIC BLOOD PRESSURE: 163 MMHG | BODY MASS INDEX: 26.39 KG/M2 | HEIGHT: 70 IN | DIASTOLIC BLOOD PRESSURE: 83 MMHG

## 2022-04-23 LAB
ALBUMIN SERPL-MCNC: 3.6 GM/DL (ref 3.4–5)
ALP BLD-CCNC: 95 IU/L (ref 40–129)
ALT SERPL-CCNC: 10 U/L (ref 10–40)
ANION GAP SERPL CALCULATED.3IONS-SCNC: 8 MMOL/L (ref 4–16)
AST SERPL-CCNC: 12 IU/L (ref 15–37)
BASOPHILS ABSOLUTE: 0.1 K/CU MM
BASOPHILS RELATIVE PERCENT: 1 % (ref 0–1)
BILIRUB SERPL-MCNC: 0.4 MG/DL (ref 0–1)
BUN BLDV-MCNC: 19 MG/DL (ref 6–23)
CALCIUM SERPL-MCNC: 9.8 MG/DL (ref 8.3–10.6)
CHLORIDE BLD-SCNC: 102 MMOL/L (ref 99–110)
CHOLESTEROL: 100 MG/DL
CO2: 29 MMOL/L (ref 21–32)
CREAT SERPL-MCNC: 0.9 MG/DL (ref 0.6–1.1)
DIFFERENTIAL TYPE: ABNORMAL
EOSINOPHILS ABSOLUTE: 0.4 K/CU MM
EOSINOPHILS RELATIVE PERCENT: 4.9 % (ref 0–3)
GFR AFRICAN AMERICAN: >60 ML/MIN/1.73M2
GFR NON-AFRICAN AMERICAN: 60 ML/MIN/1.73M2
GLUCOSE BLD-MCNC: 92 MG/DL (ref 70–99)
HCT VFR BLD CALC: 32.8 % (ref 37–47)
HDLC SERPL-MCNC: 63 MG/DL
HEMOGLOBIN: 10.3 GM/DL (ref 12.5–16)
IMMATURE NEUTROPHIL %: 0.3 % (ref 0–0.43)
LDL CHOLESTEROL CALCULATED: 31 MG/DL
LYMPHOCYTES ABSOLUTE: 2.5 K/CU MM
LYMPHOCYTES RELATIVE PERCENT: 27.8 % (ref 24–44)
MAGNESIUM: 1.8 MG/DL (ref 1.8–2.4)
MCH RBC QN AUTO: 30.4 PG (ref 27–31)
MCHC RBC AUTO-ENTMCNC: 31.4 % (ref 32–36)
MCV RBC AUTO: 96.8 FL (ref 78–100)
MONOCYTES ABSOLUTE: 1.1 K/CU MM
MONOCYTES RELATIVE PERCENT: 12 % (ref 0–4)
PDW BLD-RTO: 14.1 % (ref 11.7–14.9)
PLATELET # BLD: 237 K/CU MM (ref 140–440)
PMV BLD AUTO: 9.3 FL (ref 7.5–11.1)
POTASSIUM SERPL-SCNC: 3.3 MMOL/L (ref 3.5–5.1)
RBC # BLD: 3.39 M/CU MM (ref 4.2–5.4)
SEGMENTED NEUTROPHILS ABSOLUTE COUNT: 4.8 K/CU MM
SEGMENTED NEUTROPHILS RELATIVE PERCENT: 54 % (ref 36–66)
SODIUM BLD-SCNC: 139 MMOL/L (ref 135–145)
TOTAL IMMATURE NEUTOROPHIL: 0.03 K/CU MM
TOTAL PROTEIN: 5.8 GM/DL (ref 6.4–8.2)
TRIGL SERPL-MCNC: 32 MG/DL
WBC # BLD: 8.9 K/CU MM (ref 4–10.5)

## 2022-04-23 PROCEDURE — 6360000002 HC RX W HCPCS: Performed by: FAMILY MEDICINE

## 2022-04-23 PROCEDURE — 96372 THER/PROPH/DIAG INJ SC/IM: CPT

## 2022-04-23 PROCEDURE — 6370000000 HC RX 637 (ALT 250 FOR IP): Performed by: NURSE PRACTITIONER

## 2022-04-23 PROCEDURE — 83735 ASSAY OF MAGNESIUM: CPT

## 2022-04-23 PROCEDURE — 80053 COMPREHEN METABOLIC PANEL: CPT

## 2022-04-23 PROCEDURE — 96376 TX/PRO/DX INJ SAME DRUG ADON: CPT

## 2022-04-23 PROCEDURE — 80061 LIPID PANEL: CPT

## 2022-04-23 PROCEDURE — 2580000003 HC RX 258: Performed by: NURSE PRACTITIONER

## 2022-04-23 PROCEDURE — 36415 COLL VENOUS BLD VENIPUNCTURE: CPT

## 2022-04-23 PROCEDURE — 85025 COMPLETE CBC W/AUTO DIFF WBC: CPT

## 2022-04-23 PROCEDURE — 6360000002 HC RX W HCPCS: Performed by: NURSE PRACTITIONER

## 2022-04-23 RX ORDER — FUROSEMIDE 20 MG/1
20 TABLET ORAL 2 TIMES DAILY
Qty: 60 TABLET | Refills: 0 | Status: ON HOLD | OUTPATIENT
Start: 2022-04-23 | End: 2022-05-12 | Stop reason: HOSPADM

## 2022-04-23 RX ORDER — POTASSIUM CHLORIDE 20 MEQ/1
20 TABLET, EXTENDED RELEASE ORAL 2 TIMES DAILY
Qty: 60 TABLET | Refills: 0 | Status: ON HOLD | OUTPATIENT
Start: 2022-04-23 | End: 2022-05-12 | Stop reason: HOSPADM

## 2022-04-23 RX ORDER — POTASSIUM CHLORIDE 20 MEQ/1
20 TABLET, EXTENDED RELEASE ORAL 2 TIMES DAILY WITH MEALS
Status: DISCONTINUED | OUTPATIENT
Start: 2022-04-23 | End: 2022-04-23 | Stop reason: HOSPADM

## 2022-04-23 RX ORDER — FUROSEMIDE 20 MG/1
20 TABLET ORAL 2 TIMES DAILY
Status: DISCONTINUED | OUTPATIENT
Start: 2022-04-23 | End: 2022-04-23 | Stop reason: HOSPADM

## 2022-04-23 RX ADMIN — POTASSIUM CHLORIDE 40 MEQ: 20 TABLET, EXTENDED RELEASE ORAL at 09:27

## 2022-04-23 RX ADMIN — AMLODIPINE BESYLATE 5 MG: 5 TABLET ORAL at 09:22

## 2022-04-23 RX ADMIN — Medication 400 UNITS: at 09:21

## 2022-04-23 RX ADMIN — ENOXAPARIN SODIUM 40 MG: 100 INJECTION SUBCUTANEOUS at 09:22

## 2022-04-23 RX ADMIN — FUROSEMIDE 20 MG: 10 INJECTION, SOLUTION INTRAMUSCULAR; INTRAVENOUS at 09:22

## 2022-04-23 RX ADMIN — PANTOPRAZOLE SODIUM 20 MG: 20 TABLET, DELAYED RELEASE ORAL at 06:06

## 2022-04-23 RX ADMIN — Medication 1000 UNITS: at 09:21

## 2022-04-23 RX ADMIN — POTASSIUM CHLORIDE 20 MEQ: 20 TABLET, EXTENDED RELEASE ORAL at 11:17

## 2022-04-23 RX ADMIN — ASPIRIN 81 MG: 81 TABLET, CHEWABLE ORAL at 09:23

## 2022-04-23 RX ADMIN — LOSARTAN POTASSIUM 100 MG: 100 TABLET, FILM COATED ORAL at 09:21

## 2022-04-23 RX ADMIN — SODIUM CHLORIDE, PRESERVATIVE FREE 10 ML: 5 INJECTION INTRAVENOUS at 09:22

## 2022-04-23 RX ADMIN — ATORVASTATIN CALCIUM 10 MG: 10 TABLET, FILM COATED ORAL at 09:21

## 2022-04-23 ASSESSMENT — PAIN SCALES - GENERAL
PAINLEVEL_OUTOF10: 0
PAINLEVEL_OUTOF10: 0

## 2022-04-23 NOTE — DISCHARGE SUMMARY
Discharge Summary    Name:  Bell Patrick /Age/Sex: 1935  (81 y.o. female)   MRN & CSN:  [id number] & [id number] Admission Date/Time: 2022 12:06 AM   Attending:  Carmen Stein MD Discharging Provider CESAR [facility] Course:   Bell Patrick is a 81 y.o.  female  who presents with Other heart failure [facility]    Hospital Course: The patient initially presented to this facility 2022 for concern of substernal chest pain. Pain resolved before arrival but during work-up was noted to have congestive heart failure. She is given IV diuretics. Troponin trend and EKG remain unchanged. Echocardiogram performed showing preserved ejection fraction but grade 3 diastolic dysfunction and valvular heart disease as noted below. She significant improved over the course of her hospital stay. She was transitioned to oral diuretics on the day of discharge. Shared decision making with patient and family family elected to discharge back to assisted living facility in the lieu of one more night of observation. Acute HFpEF. Resolving BNP 2.555. CXR: () cardiomegaly with pulmonary vascular congestion. TTE () EF 50%, moderate LVH, septal bounce, G3 DD, moderately dilated atrium, moderate TR, mild MS               Lasix p.o. 20 mg twice daily-increased. monitor daily weights   Will need close follow-up with cardiology to continue increased Lasix.                 COPD-appears stable              Continue bronchodilators/pulmonary hygiene     HTN-continue Cozaar/Norvasc              Somewhat uncontrolled trends during hospital stay   Monitor blood pressure daily      Depression-continue Zoloft     Cognitive dysfunction-continue Aricept     Anemia, chronic normocytic. H/H 10.3/32.8-on the day of discharge              Appears at baseline              likely KATHERINE.   Continue iron supplementation      The patient expressed appropriate understanding of and agreement with the discharge recommendations, medications, and plan.      Consults this admission:  None    Discharge Instruction:   Follow up appointments: Cardiology schedule appointment ASAP   Primary care physician:  within 2 weeks    Diet:  cardiac diet   Activity: activity as tolerated  Disposition: Discharged to:   [x]Home, []HHC, []SNF, []Acute Rehab, []Hospice   Condition on discharge: Stable    Discharge Medications:        Medication List      START taking these medications    potassium chloride 20 MEQ extended release tablet  Commonly known as: KLOR-CON M  Take 1 tablet by mouth 2 times daily        CHANGE how you take these medications    furosemide 20 MG tablet  Commonly known as: LASIX  Take 1 tablet by mouth 2 times daily  What changed: when to take this     sennosides-docusate sodium 8.6-50 MG tablet  Commonly known as: SENOKOT-S  Take 2 tablets by mouth daily as needed for Constipation  What changed:   · when to take this  · reasons to take this        CONTINUE taking these medications    amLODIPine 5 MG tablet  Commonly known as: NORVASC     aspirin 81 MG chewable tablet     atorvastatin 10 MG tablet  Commonly known as: LIPITOR     diphenhydrAMINE 25 MG tablet  Commonly known as: BENADRYL     donepezil 5 MG tablet  Commonly known as: ARICEPT     ferrous sulfate 325 (65 Fe) MG tablet  Commonly known as: IRON 325  Take 1 tablet by mouth every other day     ibuprofen 600 MG tablet  Commonly known as: ADVIL;MOTRIN     losartan 100 MG tablet  Commonly known as: COZAAR     pantoprazole 20 MG tablet  Commonly known as: PROTONIX     sertraline 25 MG tablet  Commonly known as: ZOLOFT     Symbicort 160-4.5 MCG/ACT Aero  Generic drug: budesonide-formoterol     Ventolin  (90 Base) MCG/ACT inhaler  Generic drug: albuterol sulfate HFA     vitamin D 25 MCG (1000 UT) Tabs tablet  Commonly known as: CHOLECALCIFEROL     vitamin E 180 MG (400 UNIT) Caps capsule           Where to Get Your Medications These medications were sent to [facility], [address] [facility]  [address] [facility] [address], [address] [place] [address]    Phone: [phone]   · furosemide 20 MG tablet  · potassium chloride 20 MEQ extended release tablet         Objective Findings at Discharge:     Vitals:    04/22/22 2230 04/23/22 0200 04/23/22 0553 04/23/22 0741   BP:  131/64  (!) 163/83   Pulse:  65  63   Resp:  16  16   Temp:  97.6 °F (36.4 °C)  98.3 °F (36.8 °C)   TempSrc:  Infrared  Oral   SpO2: 92% 93%  95%   Weight:   184 lb 4.8 oz (83.6 kg)    Height:                  Physical Exam: 04/23/22     GEN -Awake nontoxic appearing female, sitting upright in bed , NAD. Normal body habitus. Appears given age. EYES -Pupils are equally round. No scleral erythema, discharge, or conjunctivitis. HENT -MM are moist. Oral pharynx without exudates, no evidence of thrush. NECK -Supple, no apparent thyromegaly or masses. RESP -CTA, no wheezes, rales or rhonchi. Symmetric chest movement while on RA.  C/V -S1/S2 auscultated. RRR without appreciable M/R/G. No JVD or carotid bruits. Peripheral pulses equal bilaterally and palpable. No peripheral edema. GI -Abdomen is soft non distended and without significant tenderness. No masses or guarding. + BS Rectal exam deferred.  -No CVA tenderness. Tello catheter is not present. LYMPH-No palpable cervical lymphadenopathy and no hepatosplenomegaly. No petechiae or ecchymoses. MS -No gross joint deformities. SKIN -Normal coloration, warm, dry. NEURO-Cranial nerves appear grossly intact, normal speech, no lateralizing weakness. PSYC-Awake, alert, oriented x 4. Poor short-term recall. Appropriate affect.       Data:     Laboratory this visit:  Reviewed  Recent Labs     04/22/22  0030 04/23/22  0635   WBC 8.8 8.9   HGB 10.8* 10.3*   HCT 34.4* 32.8*    237      Recent Labs     04/22/22  0030 04/23/22  0635    139   K 3.9 3.3*    102   CO2 27 29   BUN 17 19 CREATININE 0.9 0.9     Recent Labs     22  0635   AST 12*   ALT 10   BILITOT 0.4   ALKPHOS 95     Radiology this visit:  Reviewed. Echocardiogram complete 2D with doppler with color    Result Date: 2022  Transthoracic Echocardiography Report (TTE)  Demographics   Patient Name       Debbie Dominguez Date of Study       2022   Date of Birth      1935        Gender              Female   Age                81 year(s)        Race                   Patient Number     [id number]        Room Number         009   Visit Number       [id number]   Corporate ID       [id number]   Accession Number   [id number]        Roxanna Fried RVT   Ordering Physician                   Interpreting        Jm Gee MD                                       Physician  Procedure Type of Study   TTE procedure:ECHOCARDIOGRAM COMPLETE 2D W DOPPLER W COLOR. Procedure Date Date: 2022 Start: 09:53 AM Study Location: Portable Technical Quality: Limited visualization due to body habitus. Indications:Chest pain. Patient Status: Routine Height: 69 inches Weight: 225 pounds BSA: 2.17 m2 BMI: 33.23 kg/m2 HR: 66 bpm BP: 175/93 mmHg  Conclusions   Summary  Left ventricular systolic function is normal.  Ejection fraction is visually estimated at 50%. Moderate left ventricular hypertrophy. Septal bounce noted. ( paradoxical motion due to bundle branch block)  Grade III diastolic dysfunction. Moderately dilated left atrium. PPM wiring visualized in right atrium. Large mitral annular calcification; mild mitral stenosis. Mean P mmHg. Mild to moderate tricuspid regurgitation; RVSP: 45 mmHg. No evidence of any pericardial effusion.    Signature   ------------------------------------------------------------------  Electronically signed by Jm Gee MD (Interpreting  physician) on 2022 at 01:12 PM ------------------------------------------------------------------   Findings   Left Ventricle  Left ventricular systolic function is normal.  Ejection fraction is visually estimated at 50%. Moderate left ventricular hypertrophy. Septal bounce noted. Grade III diastolic dysfunction. Left ventricle size is normal.   Left Atrium  Moderately dilated left atrium. Right Atrium  PPM wiring visualized in right atrium. Right Ventricle  PPM wiring visualized within the right ventricle. Aortic Valve  Lambl's excresence noted on the aortic valve leaflet tips. Trace aortic  regurgitation. Mitral Valve  Large mitral annular calcification; mild mitral stenosis. Mean P mmHg. Tricuspid Valve  Mild to moderate tricuspid regurgitation; RVSP: 45 mmHg. Pulmonic Valve  Trace pulmonic regurgitation present. Pericardial Effusion  No evidence of any pericardial effusion. Pleural Effusion  No evidence of pleural effusion. Miscellaneous  IVC and abdominal aorta are within normal limits.   M-Mode/2D Measurements & Calculations   LV Diastolic Dimension:   LV Systolic Dimension:   LA Dimension: 5.2 cmAO  3.4 cm                    2.5 cm                   Root Dimension: 3.6 cm  LV FS:26.5 %              LV Volume Diastolic:  LV PW Diastolic: 1.7 cm   00.1 ml  LV PW Systolic: 2 cm      LV Volume Systolic: 70.0  Septum Diastolic: 1.7 cm  ml                       RV Diastolic Dimension:  Septum Systolic: 1.8 cm   LV EDV/LV EDV Index:     3.8 cm  CO: 5.77 l/min            39.3 ml/18 m2LV ESV/LV  CI: 2.66 l/m*m2           ESV Index: 15.6 ml/7 m2  LA/Aorta: 1.44                            EF Calculated (A4C):                            60.3 %                            EF Calculated (2D): 53 %                             LVOT: 2.2 cm  Doppler Measurements & Calculations   MV Peak E-Wave: 169     AV Peak Velocity: 173 cm/s  LVOT Peak Velocity: 127  cm/s                    AV Peak Gradient: 11.97     cm/s  MV Peak A-Wave: 84.6

## 2022-04-23 NOTE — PROGRESS NOTES
Discharge instructions with lab orders given to patient and son, all questions answered, verbalized understanding, patient taken to car with all belongings to be driven home by son.

## 2022-04-23 NOTE — PLAN OF CARE
Problem: Discharge Planning  Goal: Discharge to home or other facility with appropriate resources  Outcome: Completed     Problem: Pain  Goal: Verbalizes/displays adequate comfort level or baseline comfort level  Outcome: Completed     Problem: Safety - Adult  Goal: Free from fall injury  Outcome: Completed     Problem: ABCDS Injury Assessment  Goal: Absence of physical injury  Outcome: Completed

## 2022-04-28 ENCOUNTER — OFFICE VISIT (OUTPATIENT)
Dept: CARDIOLOGY CLINIC | Age: 87
End: 2022-04-28
Payer: MEDICARE

## 2022-04-28 VITALS
SYSTOLIC BLOOD PRESSURE: 128 MMHG | WEIGHT: 195 LBS | DIASTOLIC BLOOD PRESSURE: 68 MMHG | HEART RATE: 66 BPM | BODY MASS INDEX: 31.34 KG/M2 | HEIGHT: 66 IN

## 2022-04-28 DIAGNOSIS — I10 ESSENTIAL HYPERTENSION: ICD-10-CM

## 2022-04-28 DIAGNOSIS — I44.2 COMPLETE HEART BLOCK (HCC): ICD-10-CM

## 2022-04-28 DIAGNOSIS — E78.5 DYSLIPIDEMIA: ICD-10-CM

## 2022-04-28 DIAGNOSIS — R60.0 EDEMA OF LOWER EXTREMITY: ICD-10-CM

## 2022-04-28 DIAGNOSIS — I50.32 CHRONIC DIASTOLIC HEART FAILURE (HCC): Primary | ICD-10-CM

## 2022-04-28 DIAGNOSIS — I27.20 PULMONARY HTN (HCC): ICD-10-CM

## 2022-04-28 PROCEDURE — 99214 OFFICE O/P EST MOD 30 MIN: CPT | Performed by: INTERNAL MEDICINE

## 2022-04-28 NOTE — PROGRESS NOTES
Fiona Wasserman MD                                  CARDIOLOGY  NOTE            Chief Complaint:    Chief Complaint   Patient presents with    Hypertension     Denies any new onset cardaic complaints. [place] F/U     Coronary Artery Disease        Recent hospitalization for congestive heart failure  Doing fairly well now      Echocardiogram 2022      Left ventricular systolic function is normal.   Ejection fraction is visually estimated at 50%. Moderate left ventricular hypertrophy. Septal bounce noted. ( paradoxical motion due to bundle branch block)  Grade III diastolic dysfunction. Moderately dilated left atrium. PPM wiring visualized in right atrium. Large mitral annular calcification; mild mitral stenosis. Mean P mmHg. Mild to moderate tricuspid regurgitation; RVSP: 45 mmHg. No evidence of any pericardial effusion. HPI:     Gonzalo Crowley is a 81y.o. year old female who presents as a follow-up after complete heart block status post permanent pacemaker. Patient was seen in the hospital in 2020 for dizziness lethargy and fatigue, when she was noted to have complete heart block. Patient denies any further syncope dizziness chest pain shortness of breath.  + LE edema    Echocardiogram 2020     Left ventricular systolic function is normal.   Ejection fraction is visually estimated at 50-55%. Mild left ventricular hypertrophy. Grade I diastolic dysfunction. PPM wiring visualized in right heart. Large mitral annular calcification present with mild mitral stenosis; mean   PmmHg. Mild mitral regurgitation. Moderate tricuspid regurgitation; RVSP is 50mmHg. Moderate PHTN present. No evidence of any pericardial effusion.       Current Outpatient Medications   Medication Sig Dispense Refill    potassium chloride (KLOR-CON M) 20 MEQ extended release tablet Take 1 tablet by mouth 2 times daily 60 tablet 0    furosemide (LASIX) 20 MG tablet Take 1 tablet by mouth 2 times daily 60 tablet 0    vitamin E 180 MG (400 UNIT) CAPS capsule Take 180 mg by mouth daily      donepezil (ARICEPT) 5 MG tablet Take 5 mg by mouth nightly      ibuprofen (ADVIL;MOTRIN) 600 MG tablet Take 600 mg by mouth every 8 hours as needed for Pain      amLODIPine (NORVASC) 5 MG tablet Take 5 mg by mouth daily      sertraline (ZOLOFT) 25 MG tablet Take 1 tablet by mouth nightly       ferrous sulfate (IRON 325) 325 (65 Fe) MG tablet Take 1 tablet by mouth every other day 30 tablet 0    aspirin 81 MG chewable tablet Take 81 mg by mouth daily      atorvastatin (LIPITOR) 10 MG tablet Take 10 mg by mouth daily      budesonide-formoterol (SYMBICORT) 160-4.5 MCG/ACT AERO Inhale 2 puffs into the lungs 2 times daily      losartan (COZAAR) 100 MG tablet Take 100 mg by mouth daily      pantoprazole (PROTONIX) 20 MG tablet Take 20 mg by mouth daily      albuterol sulfate HFA (VENTOLIN HFA) 108 (90 Base) MCG/ACT inhaler Inhale 2 puffs into the lungs every 6 hours as needed for Wheezing      vitamin D (CHOLECALCIFEROL) 25 MCG (1000 UT) TABS tablet Take 1,000 Units by mouth daily      diphenhydrAMINE (BENADRYL) 25 MG tablet Take 25 mg by mouth every 6 hours as needed for Itching (Patient not taking: Reported on 4/28/2022)      sennosides-docusate sodium (SENOKOT-S) 8.6-50 MG tablet Take 2 tablets by mouth daily as needed for Constipation (Patient taking differently: Take 2 tablets by mouth as needed ) 1 tablet 0     No current facility-administered medications for this visit.        Allergies:     Lisinopril, Pcn [penicillins], and Sulfa antibiotics    Patient History:    Past Medical History:   Diagnosis Date    [facility]throfibrosis [facility] total [facility] arthroplasty ([facility]     bilateral    Asthma     CAD (coronary artery disease)     CHF (congestive heart failure) (HCC)     Constipation     DDD (degenerative disc disease), cervical     Degenerative disc disease, cervical     Diabetes mellitus ([facility]     Femur fracture, right ([address]     distal femur    GERD (gastroesophageal reflux disease)     Hypertension     Venous stasis      Past Surgical History:   Procedure Laterality Date    APPENDECTOMY      KNEE SURGERY      PACEMAKER INSERTION      AV sequential pacer    PACEMAKER INSERTION N/A 12/27/2020    PACEMAKER INSERTION PERMANENT performed by Jesse Suarez MD at [address] [place] [address]  12/27/2020    AV sequential     No family history on file. Social History     Tobacco Use    Smoking status: Never Smoker    Smokeless tobacco: Never Used   Substance Use Topics    Alcohol use: Not Currently        Review of Systems:     · Constitutional:  No Fever or Weight Loss   · Eyes: No Decreased Vision  · ENT: No Headaches, Hearing Loss or Vertigo  · Cardiovascular: No Chest Pain,  No Shortness of breath, No Palpitations. + Edema   · Respiratory: No cough or wheezing . No Respiratory distress   · Gastrointestinal: No abdominal pain, appetite loss, blood in stools, constipation, diarrhea or heartburn  · Genitourinary: No dysuria, trouble voiding, or hematuria  · Musculoskeletal:  denies any new  joint aches , or pain   · Integumentary: No rash or pruritis  · Neurological: No TIA or stroke symptoms  · Psychiatric: No anxiety or depression  · Endocrine: No malaise, fatigue or temperature intolerance  · Hematologic/Lymphatic: No bleeding problems, blood clots or swollen lymph nodes  · Allergic/Immunologic: No nasal congestion or hives        Objective:      Physical Exam:    /68   Pulse 66   Ht 5' 6\" (1.676 m)   Wt 195 lb (88.5 kg)   BMI 31.47 kg/m²   Wt Readings from Last 3 Encounters:   04/28/22 195 lb (88.5 kg)   04/23/22 184 lb 4.8 oz (83.6 kg)   03/11/22 190 lb (86.2 kg)     Body mass index is 31.47 kg/m². Vitals:    04/28/22 1501   BP: 128/68   Pulse: 66        General Appearance and Constitutional: Conversant, Well developed, Well nourished, No acute distress, Non-toxic appearance.    HEENT: Normocephalic, Atraumatic, Bilateral external ears normal, Oropharynx moist, No oral exudates,   Nose normal.   Neck- Normal range of motion, No tenderness, Supple  Eyes:  EOMI, Conjunctiva normal, No discharge. Respiratory:  Normal breath sounds, No respiratory distress, No wheezing, No Rales, No Ronchi. No chest tenderness. Cardiovascular: S1-S2, no added heart sounds, No Mumurs appreciated. No gallops, rubs. + 2 Pedal Edema   GI:  Bowel sounds normal, Soft, No tenderness,  :  No costovertebral angle tenderness   Musculoskeletal:  No gross deformities. Back- No tenderness  Integument:  Well hydrated, no rash   Lymphatic:  No lymphadenopathy noted   Neurologic:  Alert & oriented x 3, Normal motor function, normal sensory function, no focal deficits noted   Psychiatric:  Speech and behavior appropriate       Medical decision making and Data review:    DATA:    Lab Results   Component Value Date    TROPONINT <0.010 04/22/2022     BNP:    Lab Results   Component Value Date    PROBNP 2,555 (H) 04/22/2022     PT/INR:  No results found for: [facility]  Lab Results   Component Value Date    LABA1C 6.0 04/22/2022     Lab Results   Component Value Date    CHOL 100 04/23/2022    TRIG 32 04/23/2022    HDL 63 04/23/2022    LDLCALC 31 04/23/2022     Lab Results   Component Value Date    WBC 8.9 04/23/2022    HGB 10.3 (L) 04/23/2022    HCT 32.8 (L) 04/23/2022    MCV 96.8 04/23/2022     04/23/2022     TSH: No results found for: TSH  Lab Results   Component Value Date    AST 12 (L) 04/23/2022    ALT 10 04/23/2022    BILITOT 0.4 04/23/2022    ALKPHOS 95 04/23/2022         All labs, medications and tests reviewed by myself including data and history from outside source , patient and available family . 1. Chronic diastolic heart failure (HCC)    2. Pulmonary HTN (HCC)    3. Edema of lower extremity    4. Complete heart block ([address]    5. Essential hypertension    6.  Dyslipidemia         Impression and Plan:      1. Complete heart block status post permanent pacemaker. Pacemaker interrogation 1/3/22 shows 99% V paced. DDD mode. EKG shows sinus rhythm V paced. Patient denies any further symptoms of dizziness lethargy or presyncope. 2. Diastolic heart failure, fairly compensated currently. 3. Moderate pulmonary hypertension    Recent hospitalization for congestive heart failure: Patient received IV diuretics. Echocardiogram showed preserved LVEF with grade 3 diastolic dysfunction. She was transitioned over to oral diuretics and was discharged home. Continue with Lasix 20 mg p.o. twice daily with potassium supplements. If patient continues to have swelling or dyspnea, will increase dose or add Bumex. 4. Essential hypertension: Blood pressure stable. Continue with Losartan / amlodipine. Lasix. 5. Hyperlipidemia: Continue with low-dose statin therapy, Lipitor 10 mg daily    6. Health maintenance:  diet and Exercise as tolerated        Return in about 3 months (around 7/28/2022). Counseled extensively and medication compliance urged. We discussed that for the  prevention of ASCVD our  goal is aggressive risk modification. Patient is encouraged to exercise even a brisk walk for 30 minutes  at least 3 to 4 times a week   Various goals were discussed and questions answered. Continue current medications. Appropriate prescriptions are addressed and refills ordered. Questions answered and patient verbalizes understanding. Call for any problems, questions, or concerns.

## 2022-04-30 ENCOUNTER — APPOINTMENT (OUTPATIENT)
Dept: CT IMAGING | Age: 87
End: 2022-04-30
Payer: MEDICARE

## 2022-04-30 ENCOUNTER — HOSPITAL ENCOUNTER (INPATIENT)
Age: 87
LOS: 14 days | Discharge: ANOTHER HOSPITAL | DRG: 480 | End: 2022-05-14
Attending: INTERNAL MEDICINE | Admitting: INTERNAL MEDICINE
Payer: MEDICARE

## 2022-04-30 ENCOUNTER — HOSPITAL ENCOUNTER (EMERGENCY)
Age: 87
Discharge: ANOTHER HOSPITAL | End: 2022-04-30
Attending: EMERGENCY MEDICINE
Payer: MEDICARE

## 2022-04-30 ENCOUNTER — APPOINTMENT (OUTPATIENT)
Dept: GENERAL RADIOLOGY | Age: 87
End: 2022-04-30
Payer: MEDICARE

## 2022-04-30 VITALS
HEART RATE: 70 BPM | SYSTOLIC BLOOD PRESSURE: 156 MMHG | OXYGEN SATURATION: 94 % | DIASTOLIC BLOOD PRESSURE: 77 MMHG | TEMPERATURE: 98.2 F | RESPIRATION RATE: 18 BRPM
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
## 2022-04-30 DIAGNOSIS — S72.002A CLOSED LEFT HIP FRACTURE, INITIAL ENCOUNTER (HCC): Primary | ICD-10-CM

## 2022-04-30 DIAGNOSIS — S72.142A CLOSED COMMINUTED INTERTROCHANTERIC FRACTURE OF LEFT FEMUR, INITIAL ENCOUNTER (HCC): ICD-10-CM

## 2022-04-30 LAB
ALBUMIN SERPL-MCNC: 3.8 GM/DL (ref 3.4–5)
ALP BLD-CCNC: 102 IU/L (ref 40–129)
ALT SERPL-CCNC: 12 U/L (ref 10–40)
ANION GAP SERPL CALCULATED.3IONS-SCNC: 8 MMOL/L (ref 4–16)
AST SERPL-CCNC: 18 IU/L (ref 15–37)
BASOPHILS ABSOLUTE: 0.1 K/CU MM
BASOPHILS RELATIVE PERCENT: 0.8 % (ref 0–1)
BILIRUB SERPL-MCNC: 0.5 MG/DL (ref 0–1)
BUN BLDV-MCNC: 18 MG/DL (ref 6–23)
CALCIUM SERPL-MCNC: 10.5 MG/DL (ref 8.3–10.6)
CHLORIDE BLD-SCNC: 105 MMOL/L (ref 99–110)
CO2: 27 MMOL/L (ref 21–32)
CREAT SERPL-MCNC: 0.8 MG/DL (ref 0.6–1.1)
DIFFERENTIAL TYPE: ABNORMAL
EOSINOPHILS ABSOLUTE: 0.5 K/CU MM
EOSINOPHILS RELATIVE PERCENT: 5.3 % (ref 0–3)
GFR AFRICAN AMERICAN: >60 ML/MIN/1.73M2
GFR NON-AFRICAN AMERICAN: >60 ML/MIN/1.73M2
GLUCOSE BLD-MCNC: 111 MG/DL (ref 70–99)
GLUCOSE BLD-MCNC: 309 MG/DL (ref 70–99)
HCT VFR BLD CALC: 33.5 % (ref 37–47)
HEMOGLOBIN: 10.4 GM/DL (ref 12.5–16)
IMMATURE NEUTROPHIL %: 0.5 % (ref 0–0.43)
LYMPHOCYTES ABSOLUTE: 3.4 K/CU MM
LYMPHOCYTES RELATIVE PERCENT: 35.6 % (ref 24–44)
MCH RBC QN AUTO: 30.1 PG (ref 27–31)
MCHC RBC AUTO-ENTMCNC: 31 % (ref 32–36)
MCV RBC AUTO: 96.8 FL (ref 78–100)
MONOCYTES ABSOLUTE: 0.9 K/CU MM
MONOCYTES RELATIVE PERCENT: 9.2 % (ref 0–4)
PDW BLD-RTO: 14 % (ref 11.7–14.9)
PLATELET # BLD: 249 K/CU MM (ref 140–440)
PMV BLD AUTO: 8.9 FL (ref 7.5–11.1)
POTASSIUM SERPL-SCNC: 4.1 MMOL/L (ref 3.5–5.1)
RBC # BLD: 3.46 M/CU MM (ref 4.2–5.4)
SEGMENTED NEUTROPHILS ABSOLUTE COUNT: 4.6 K/CU MM
SEGMENTED NEUTROPHILS RELATIVE PERCENT: 48.6 % (ref 36–66)
SODIUM BLD-SCNC: 140 MMOL/L (ref 135–145)
TOTAL IMMATURE NEUTOROPHIL: 0.05 K/CU MM
TOTAL PROTEIN: 6 GM/DL (ref 6.4–8.2)
WBC # BLD: 9.4 K/CU MM (ref 4–10.5)

## 2022-04-30 PROCEDURE — 70450 CT HEAD/BRAIN W/O DYE: CPT

## 2022-04-30 PROCEDURE — 6370000000 HC RX 637 (ALT 250 FOR IP): Performed by: INTERNAL MEDICINE

## 2022-04-30 PROCEDURE — 6360000002 HC RX W HCPCS: Performed by: EMERGENCY MEDICINE

## 2022-04-30 PROCEDURE — 96374 THER/PROPH/DIAG INJ IV PUSH: CPT

## 2022-04-30 PROCEDURE — 1200000000 HC SEMI PRIVATE

## 2022-04-30 PROCEDURE — 6370000000 HC RX 637 (ALT 250 FOR IP): Performed by: EMERGENCY MEDICINE

## 2022-04-30 PROCEDURE — 94761 N-INVAS EAR/PLS OXIMETRY MLT: CPT

## 2022-04-30 PROCEDURE — 71045 X-RAY EXAM CHEST 1 VIEW: CPT

## 2022-04-30 PROCEDURE — 96375 TX/PRO/DX INJ NEW DRUG ADDON: CPT

## 2022-04-30 PROCEDURE — 80053 COMPREHEN METABOLIC PANEL: CPT

## 2022-04-30 PROCEDURE — 6360000002 HC RX W HCPCS: Performed by: INTERNAL MEDICINE

## 2022-04-30 PROCEDURE — 73502 X-RAY EXAM HIP UNI 2-3 VIEWS: CPT

## 2022-04-30 PROCEDURE — 6370000000 HC RX 637 (ALT 250 FOR IP): Performed by: NURSE PRACTITIONER

## 2022-04-30 PROCEDURE — 85025 COMPLETE CBC W/AUTO DIFF WBC: CPT

## 2022-04-30 PROCEDURE — 96376 TX/PRO/DX INJ SAME DRUG ADON: CPT

## 2022-04-30 PROCEDURE — 72125 CT NECK SPINE W/O DYE: CPT

## 2022-04-30 PROCEDURE — 2700000000 HC OXYGEN THERAPY PER DAY

## 2022-04-30 PROCEDURE — 94640 AIRWAY INHALATION TREATMENT: CPT

## 2022-04-30 PROCEDURE — 99285 EMERGENCY DEPT VISIT HI MDM: CPT

## 2022-04-30 PROCEDURE — 2580000003 HC RX 258: Performed by: INTERNAL MEDICINE

## 2022-04-30 PROCEDURE — 82962 GLUCOSE BLOOD TEST: CPT

## 2022-04-30 RX ORDER — ALBUTEROL SULFATE 90 UG/1
2 AEROSOL, METERED RESPIRATORY (INHALATION) EVERY 6 HOURS PRN
Status: DISCONTINUED | OUTPATIENT
Start: 2022-04-30 | End: 2022-05-14 | Stop reason: HOSPADM

## 2022-04-30 RX ORDER — MORPHINE SULFATE 2 MG/ML
2 INJECTION, SOLUTION INTRAMUSCULAR; INTRAVENOUS ONCE
Status: COMPLETED | OUTPATIENT
Start: 2022-04-30 | End: 2022-04-30

## 2022-04-30 RX ORDER — PANTOPRAZOLE SODIUM 20 MG/1
20 TABLET, DELAYED RELEASE ORAL DAILY
Status: DISCONTINUED | OUTPATIENT
Start: 2022-04-30 | End: 2022-05-14 | Stop reason: HOSPADM

## 2022-04-30 RX ORDER — NICOTINE POLACRILEX 4 MG
15 LOZENGE BUCCAL PRN
Status: DISCONTINUED | OUTPATIENT
Start: 2022-04-30 | End: 2022-04-30 | Stop reason: ALTCHOICE

## 2022-04-30 RX ORDER — SODIUM CHLORIDE 0.9 % (FLUSH) 0.9 %
5-40 SYRINGE (ML) INJECTION EVERY 12 HOURS SCHEDULED
Status: DISCONTINUED | OUTPATIENT
Start: 2022-04-30 | End: 2022-05-14 | Stop reason: HOSPADM

## 2022-04-30 RX ORDER — DEXTROSE MONOHYDRATE 25 G/50ML
12.5 INJECTION, SOLUTION INTRAVENOUS PRN
Status: DISCONTINUED | OUTPATIENT
Start: 2022-04-30 | End: 2022-05-14 | Stop reason: HOSPADM

## 2022-04-30 RX ORDER — SODIUM CHLORIDE 9 MG/ML
INJECTION, SOLUTION INTRAVENOUS PRN
Status: DISCONTINUED | OUTPATIENT
Start: 2022-04-30 | End: 2022-05-14 | Stop reason: HOSPADM

## 2022-04-30 RX ORDER — SODIUM CHLORIDE 0.9 % (FLUSH) 0.9 %
10 SYRINGE (ML) INJECTION PRN
Status: DISCONTINUED | OUTPATIENT
Start: 2022-04-30 | End: 2022-05-14 | Stop reason: HOSPADM

## 2022-04-30 RX ORDER — AMLODIPINE BESYLATE 10 MG/1
10 TABLET ORAL DAILY
Status: DISCONTINUED | OUTPATIENT
Start: 2022-05-01 | End: 2022-05-14 | Stop reason: HOSPADM

## 2022-04-30 RX ORDER — ASPIRIN 81 MG/1
81 TABLET, CHEWABLE ORAL DAILY
Status: DISCONTINUED | OUTPATIENT
Start: 2022-04-30 | End: 2022-05-01

## 2022-04-30 RX ORDER — ATORVASTATIN CALCIUM 10 MG/1
10 TABLET, FILM COATED ORAL DAILY
Status: DISCONTINUED | OUTPATIENT
Start: 2022-04-30 | End: 2022-05-14 | Stop reason: HOSPADM

## 2022-04-30 RX ORDER — AMLODIPINE BESYLATE 5 MG/1
5 TABLET ORAL DAILY
Status: DISCONTINUED | OUTPATIENT
Start: 2022-04-30 | End: 2022-04-30

## 2022-04-30 RX ORDER — ACETAMINOPHEN 650 MG/1
650 SUPPOSITORY RECTAL EVERY 6 HOURS PRN
Status: DISCONTINUED | OUTPATIENT
Start: 2022-04-30 | End: 2022-05-01

## 2022-04-30 RX ORDER — HYDROCODONE BITARTRATE AND ACETAMINOPHEN 5; 325 MG/1; MG/1
1 TABLET ORAL ONCE
Status: COMPLETED | OUTPATIENT
Start: 2022-04-30 | End: 2022-04-30

## 2022-04-30 RX ORDER — VITAMIN E 268 MG
400 CAPSULE ORAL DAILY
Status: DISCONTINUED | OUTPATIENT
Start: 2022-04-30 | End: 2022-05-14 | Stop reason: HOSPADM

## 2022-04-30 RX ORDER — AMLODIPINE BESYLATE 5 MG/1
5 TABLET ORAL ONCE
Status: COMPLETED | OUTPATIENT
Start: 2022-04-30 | End: 2022-04-30

## 2022-04-30 RX ORDER — VITAMIN B COMPLEX
1000 TABLET ORAL DAILY
Status: DISCONTINUED | OUTPATIENT
Start: 2022-04-30 | End: 2022-05-14 | Stop reason: HOSPADM

## 2022-04-30 RX ORDER — FENTANYL CITRATE 50 UG/ML
25 INJECTION, SOLUTION INTRAMUSCULAR; INTRAVENOUS ONCE
Status: COMPLETED | OUTPATIENT
Start: 2022-04-30 | End: 2022-04-30

## 2022-04-30 RX ORDER — DEXTROSE MONOHYDRATE 50 MG/ML
100 INJECTION, SOLUTION INTRAVENOUS PRN
Status: DISCONTINUED | OUTPATIENT
Start: 2022-04-30 | End: 2022-05-14 | Stop reason: HOSPADM

## 2022-04-30 RX ORDER — ACETAMINOPHEN 325 MG/1
650 TABLET ORAL EVERY 6 HOURS PRN
Status: DISCONTINUED | OUTPATIENT
Start: 2022-04-30 | End: 2022-05-01

## 2022-04-30 RX ORDER — INSULIN LISPRO 100 [IU]/ML
0-12 INJECTION, SOLUTION INTRAVENOUS; SUBCUTANEOUS
Status: DISCONTINUED | OUTPATIENT
Start: 2022-05-01 | End: 2022-05-14 | Stop reason: HOSPADM

## 2022-04-30 RX ORDER — LOSARTAN POTASSIUM 100 MG/1
100 TABLET ORAL DAILY
Status: DISCONTINUED | OUTPATIENT
Start: 2022-04-30 | End: 2022-05-04

## 2022-04-30 RX ORDER — ENOXAPARIN SODIUM 100 MG/ML
40 INJECTION SUBCUTANEOUS DAILY
Status: DISCONTINUED | OUTPATIENT
Start: 2022-04-30 | End: 2022-05-01

## 2022-04-30 RX ORDER — CLINDAMYCIN PHOSPHATE 900 MG/50ML
900 INJECTION INTRAVENOUS
Status: DISCONTINUED | OUTPATIENT
Start: 2022-05-01 | End: 2022-05-01 | Stop reason: ALTCHOICE

## 2022-04-30 RX ORDER — ONDANSETRON 2 MG/ML
4 INJECTION INTRAMUSCULAR; INTRAVENOUS EVERY 6 HOURS PRN
Status: DISCONTINUED | OUTPATIENT
Start: 2022-04-30 | End: 2022-05-14 | Stop reason: HOSPADM

## 2022-04-30 RX ORDER — BUDESONIDE AND FORMOTEROL FUMARATE DIHYDRATE 160; 4.5 UG/1; UG/1
2 AEROSOL RESPIRATORY (INHALATION) 2 TIMES DAILY
Status: DISCONTINUED | OUTPATIENT
Start: 2022-04-30 | End: 2022-05-07

## 2022-04-30 RX ORDER — FERROUS SULFATE 325(65) MG
325 TABLET ORAL EVERY OTHER DAY
Status: DISCONTINUED | OUTPATIENT
Start: 2022-05-01 | End: 2022-05-14 | Stop reason: HOSPADM

## 2022-04-30 RX ORDER — POLYETHYLENE GLYCOL 3350 17 G/17G
17 POWDER, FOR SOLUTION ORAL DAILY PRN
Status: DISCONTINUED | OUTPATIENT
Start: 2022-04-30 | End: 2022-05-14 | Stop reason: HOSPADM

## 2022-04-30 RX ORDER — MORPHINE SULFATE 2 MG/ML
2 INJECTION, SOLUTION INTRAMUSCULAR; INTRAVENOUS EVERY 4 HOURS PRN
Status: DISCONTINUED | OUTPATIENT
Start: 2022-04-30 | End: 2022-05-01

## 2022-04-30 RX ORDER — OXYCODONE HYDROCHLORIDE 5 MG/1
2.5 TABLET ORAL EVERY 6 HOURS PRN
Status: DISCONTINUED | OUTPATIENT
Start: 2022-04-30 | End: 2022-05-01

## 2022-04-30 RX ORDER — INSULIN LISPRO 100 [IU]/ML
0-6 INJECTION, SOLUTION INTRAVENOUS; SUBCUTANEOUS NIGHTLY
Status: DISCONTINUED | OUTPATIENT
Start: 2022-04-30 | End: 2022-05-14 | Stop reason: HOSPADM

## 2022-04-30 RX ORDER — ONDANSETRON 4 MG/1
4 TABLET, ORALLY DISINTEGRATING ORAL EVERY 8 HOURS PRN
Status: DISCONTINUED | OUTPATIENT
Start: 2022-04-30 | End: 2022-05-14 | Stop reason: HOSPADM

## 2022-04-30 RX ORDER — DONEPEZIL HYDROCHLORIDE 5 MG/1
5 TABLET, FILM COATED ORAL NIGHTLY
Status: DISCONTINUED | OUTPATIENT
Start: 2022-04-30 | End: 2022-05-14 | Stop reason: HOSPADM

## 2022-04-30 RX ADMIN — SODIUM CHLORIDE, PRESERVATIVE FREE 10 ML: 5 INJECTION INTRAVENOUS at 21:42

## 2022-04-30 RX ADMIN — FENTANYL CITRATE 25 MCG: 0.05 INJECTION, SOLUTION INTRAMUSCULAR; INTRAVENOUS at 05:18

## 2022-04-30 RX ADMIN — ACETAMINOPHEN 650 MG: 325 TABLET ORAL at 21:42

## 2022-04-30 RX ADMIN — MORPHINE SULFATE 2 MG: 2 INJECTION, SOLUTION INTRAMUSCULAR; INTRAVENOUS at 06:38

## 2022-04-30 RX ADMIN — HYDROCODONE BITARTRATE AND ACETAMINOPHEN 1 TABLET: 5; 325 TABLET ORAL at 04:13

## 2022-04-30 RX ADMIN — BUDESONIDE AND FORMOTEROL FUMARATE DIHYDRATE 2 PUFF: 160; 4.5 AEROSOL RESPIRATORY (INHALATION) at 13:06

## 2022-04-30 RX ADMIN — BUDESONIDE AND FORMOTEROL FUMARATE DIHYDRATE 2 PUFF: 160; 4.5 AEROSOL RESPIRATORY (INHALATION) at 22:09

## 2022-04-30 RX ADMIN — DONEPEZIL HYDROCHLORIDE 5 MG: 5 TABLET, FILM COATED ORAL at 21:42

## 2022-04-30 RX ADMIN — FENTANYL CITRATE 25 MCG: 0.05 INJECTION, SOLUTION INTRAMUSCULAR; INTRAVENOUS at 04:38

## 2022-04-30 RX ADMIN — MORPHINE SULFATE 2 MG: 2 INJECTION, SOLUTION INTRAMUSCULAR; INTRAVENOUS at 07:54

## 2022-04-30 RX ADMIN — MORPHINE SULFATE 2 MG: 2 INJECTION, SOLUTION INTRAMUSCULAR; INTRAVENOUS at 13:46

## 2022-04-30 RX ADMIN — MORPHINE SULFATE 2 MG: 2 INJECTION, SOLUTION INTRAMUSCULAR; INTRAVENOUS at 10:28

## 2022-04-30 RX ADMIN — AMLODIPINE BESYLATE 5 MG: 5 TABLET ORAL at 21:59

## 2022-04-30 RX ADMIN — MORPHINE SULFATE 2 MG: 2 INJECTION, SOLUTION INTRAMUSCULAR; INTRAVENOUS at 21:57

## 2022-04-30 RX ADMIN — SERTRALINE HYDROCHLORIDE 25 MG: 50 TABLET ORAL at 21:42

## 2022-04-30 RX ADMIN — INSULIN LISPRO 4 UNITS: 100 INJECTION, SOLUTION INTRAVENOUS; SUBCUTANEOUS at 21:58

## 2022-04-30 ASSESSMENT — PAIN DESCRIPTION - PAIN TYPE: TYPE: ACUTE PAIN

## 2022-04-30 ASSESSMENT — PAIN SCALES - GENERAL
PAINLEVEL_OUTOF10: 5
PAINLEVEL_OUTOF10: 5
PAINLEVEL_OUTOF10: 0
PAINLEVEL_OUTOF10: 7
PAINLEVEL_OUTOF10: 4
PAINLEVEL_OUTOF10: 9

## 2022-04-30 ASSESSMENT — PAIN DESCRIPTION - ONSET: ONSET: ON-GOING

## 2022-04-30 ASSESSMENT — ENCOUNTER SYMPTOMS
COUGH: 0
SORE THROAT: 0
SHORTNESS OF BREATH: 0
DIARRHEA: 0
WHEEZING: 0
RHINORRHEA: 0
SINUS PRESSURE: 0
CONSTIPATION: 0
VOMITING: 0
NAUSEA: 0
ABDOMINAL PAIN: 0

## 2022-04-30 ASSESSMENT — PAIN - FUNCTIONAL ASSESSMENT
PAIN_FUNCTIONAL_ASSESSMENT: PREVENTS OR INTERFERES SOME ACTIVE ACTIVITIES AND ADLS
PAIN_FUNCTIONAL_ASSESSMENT: 0-10
PAIN_FUNCTIONAL_ASSESSMENT: INTOLERABLE, UNABLE TO DO ANY ACTIVE OR PASSIVE ACTIVITIES

## 2022-04-30 ASSESSMENT — PAIN DESCRIPTION - LOCATION
LOCATION: HIP;LEG
LOCATION: LEG
LOCATION: LEG

## 2022-04-30 ASSESSMENT — PAIN DESCRIPTION - ORIENTATION
ORIENTATION: LEFT

## 2022-04-30 ASSESSMENT — PAIN DESCRIPTION - FREQUENCY: FREQUENCY: CONTINUOUS

## 2022-04-30 ASSESSMENT — PAIN DESCRIPTION - DESCRIPTORS
DESCRIPTORS: SHOOTING;SHARP
DESCRIPTORS: SHARP

## 2022-04-30 NOTE — ED NOTES
Bedside report given to [facility] crew, room assignment and nurse station phone number provided to patients sons at bedside. Attempted to call report to [phone], no answer.      Vanessa Jain RN  04/30/22 4795

## 2022-04-30 NOTE — PROGRESS NOTES
I spoke with the patient's son Keysha Capellan who is the power of . We discussed the fracture and planned treatment. I have recommended open reduction and internal fixation of the left hip with an intramedullary nail. We discussed risks and benefits in detail. He understands and would like to proceed with surgery. Patient will be n.p.o. after midnight tonight.   Plan will be to proceed with surgery tomorrow morning at 7:30 AM.

## 2022-04-30 NOTE — PLAN OF CARE
Transfer note (patient coming from [facility] in [place]): If patient arrives to the unit after 7 AM, please page on-call physician for orders and for patient to be seen. Do not page Dr. Andrea Mobley after 7 AM. Thank you. 67-year-old female with history of CAD, chronic diastolic heart failure (most recent echocardiogram with grade 3 diastolic dysfunction), dementia, well-controlled diabetes type 2 (most recent hemoglobin A1c of 6.0), GERD, essential hypertension, normocytic anemia, who presents to the emergency room (at [facility] in [place]) with complaints of fall, left hip pain. Patient ambulated to the bathroom without her walker. While she was being walked (with the assistance of a staff at the facility) back into her room, she tripped and sustained a fall. She was referred to the emergency room at [facility] where she was found to have intertrochanteric left femur fracture on x-ray. CThead is unremarkable for intracranial pathology. Orthopedic surgeon, Dr. Evone Sacks, has been consulted from the emergency room, planning evaluation for possible surgery today. Plan:  1. Left intertrochanteric femur fracture, initial encounter. Secondary to mechanical fall (ambulated without her walker). Will place on as needed pain medicines. Maintain on fall precautions. Orthopedic surgery consult this morning. 2. Fall at nursing home, initial encounter. This appears to be mechanical in nature. Will place on fall precautions. 3. Well-controlled diabetes type 2 with most recent hemoglobin A1c of 6.0. Will place on sliding scale insulin. Monitor Accu-Chek closely.   4. Other comorbidities:  history of CAD, chronic diastolic heart failure (most recent echocardiogram with grade 3 diastolic dysfunction), GERD, dementia, essential hypertension, normocytic anemia.       SIGNED: Albaro Diamond MD, MPH . 4/30/2022

## 2022-04-30 NOTE — ED NOTES
Patient yelling, \"ouchy ouchy. \" This nurse entered room, patient crying stating she is in pain. Dr. Matteo Malhotra notified, last pain medication administered 1776.      Nallely Rivera RN  04/30/22 3428

## 2022-04-30 NOTE — ED NOTES
This nurse entered patients room to administer pain medication, patient semi fowlers in bed with eyes closed, appears to be asleep. Respirations even regular and unlabored. Pain medication held at this time.      Selvin Johnson RN  04/30/22 [id number]

## 2022-04-30 NOTE — ED NOTES
Patient semi fowlers in bed with eyes closed, appears to be asleep. Respirations even regular and unlabored. Room lights remain dimmed.      Geni Mcgregor RN  04/30/22 4783

## 2022-04-30 NOTE — ED NOTES
Telephone report given to Cristel Argueta RN at [facility], [address] [place] [address]  04/30/22 [id number]

## 2022-04-30 NOTE — ED NOTES
[facility]. [address] scheduled [place] eta 1030.        Angel Luis Valdes RN  04/30/22 [phone]

## 2022-04-30 NOTE — ED NOTES
Report received from [facility]. Patient semi fowlers in bed with eyes closed, appears to be asleep. Respirations even regular and unlabored, remains on 2L NC. Room lights remain dimmed.       Vanessa Jain RN  04/30/22 1063

## 2022-04-30 NOTE — ED PROVIDER NOTES
Emergency Department Encounter    Patient: Flaca Jackson  MRN: [id number]  : 1935  Date of Evaluation: 2022  ED Provider:  [id number] [facility],     Triage Chief Complaint:   Fall    HPI:  Flaca Jackson is a 81 y.o. female with past medical history as listed below, on 81 mg of aspirin who presents with a mechanical fall. Patient is supposed to ambulate with a walker. She was at her skilled [facility], walking back from the bathroom with a nurse assist, when she tripped and fell on her left hip on the hard floor. She admits to left hip pain with movement. Pain is a 2 out of 10 while at rest.  She denies head trauma or LOC. She was unable to get up on her own. Denies any neck or back pain. Denies F/C, CP, SOB, palpitations, N/V, abdominal pain, dysuria, diarrhea and constipatin. ROS:  Review of Systems   Constitutional: Negative for chills and fever. HENT: Negative for congestion, rhinorrhea, sinus pressure and sore throat. Eyes: Negative for visual disturbance. Respiratory: Negative for cough, shortness of breath and wheezing. Cardiovascular: Negative for chest pain and palpitations. Gastrointestinal: Negative for abdominal pain, constipation, diarrhea, nausea and vomiting. Genitourinary: Negative for dysuria, frequency and urgency. Musculoskeletal: Positive for arthralgias and myalgias. Skin: Negative for rash. Neurological: Negative for dizziness and syncope. Psychiatric/Behavioral: Negative for agitation, behavioral problems and confusion.          Past Medical History:   Diagnosis Date    Arthrofibrosis of total knee arthroplasty ([facility] [address]     bilateral    Asthma     CAD (coronary artery disease)     CHF (congestive heart failure) ([facility])     Constipation     DDD (degenerative disc disease), cervical     Degenerative disc disease, cervical     Diabetes mellitus ([address]     Femur fracture, right ([facility])     distal femur    GERD (gastroesophageal reflux disease)     Hypertension     Venous stasis      Past Surgical History:   Procedure Laterality Date    APPENDECTOMY      KNEE SURGERY      PACEMAKER INSERTION      AV sequential pacer    PACEMAKER INSERTION N/A 12/27/2020    PACEMAKER INSERTION PERMANENT performed by Yuniel Aguero MD at [facility]  12/27/2020    AV sequential     History reviewed. No pertinent family history. Social History     Socioeconomic History    Marital status:      Spouse name: Not on file    Number of children: 3    Years of education: Not on file    Highest education level: Not on file   Occupational History    Not on file   Tobacco Use    Smoking status: Never Smoker    Smokeless tobacco: Never Used   Vaping Use    Vaping Use: Never used   Substance and Sexual Activity    Alcohol use: Not Currently    Drug use: Never    Sexual activity: Not Currently   Other Topics Concern    Not on file   Social History Narrative    Not on file     Social Determinants of Health     Financial Resource Strain:     Difficulty of Paying Living Expenses: Not on file   Food Insecurity:     Worried About Running Out of Food in the Last Year: Not on file    Bj of Food in the Last Year: Not on file   Transportation Needs:     Lack of Transportation (Medical): Not on file    Lack of Transportation (Non-Medical):  Not on file   Physical Activity:     Days of Exercise per Week: Not on file    Minutes of Exercise per Session: Not on file   Stress:     Feeling of Stress : Not on file   Social Connections:     Frequency of Communication with Friends and Family: Not on file    Frequency of Social Gatherings with Friends and Family: Not on file    Attends Alevism Services: Not on file    Active Member of Clubs or Organizations: Not on file    Attends Club or Organization Meetings: Not on file    Marital Status: Not on file   Intimate Partner Violence:     Fear of Current or Ex-Partner: Not on file    Emotionally Abused: Not on file    Physically Abused: Not on file    Sexually Abused: Not on file   Housing Stability:     Unable to Pay for Housing in the Last Year: Not on file    Number of Places Lived in the Last Year: Not on file    Unstable Housing in the Last Year: Not on file     No current facility-administered medications for this encounter.      Current Outpatient Medications   Medication Sig Dispense Refill    potassium chloride (KLOR-CON M) 20 MEQ extended release tablet Take 1 tablet by mouth 2 times daily 60 tablet 0    furosemide (LASIX) 20 MG tablet Take 1 tablet by mouth 2 times daily 60 tablet 0    diphenhydrAMINE (BENADRYL) 25 MG tablet Take 25 mg by mouth every 6 hours as needed for Itching (Patient not taking: Reported on 4/28/2022)      vitamin E 180 MG (400 UNIT) CAPS capsule Take 180 mg by mouth daily      donepezil (ARICEPT) 5 MG tablet Take 5 mg by mouth nightly      ibuprofen (ADVIL;MOTRIN) 600 MG tablet Take 600 mg by mouth every 8 hours as needed for Pain      amLODIPine (NORVASC) 5 MG tablet Take 5 mg by mouth daily      sertraline (ZOLOFT) 25 MG tablet Take 1 tablet by mouth nightly       ferrous sulfate (IRON 325) 325 (65 Fe) MG tablet Take 1 tablet by mouth every other day 30 tablet 0    sennosides-docusate sodium (SENOKOT-S) 8.6-50 MG tablet Take 2 tablets by mouth daily as needed for Constipation (Patient taking differently: Take 2 tablets by mouth as needed ) 1 tablet 0    aspirin 81 MG chewable tablet Take 81 mg by mouth daily      atorvastatin (LIPITOR) 10 MG tablet Take 10 mg by mouth daily      budesonide-formoterol (SYMBICORT) 160-4.5 MCG/ACT AERO Inhale 2 puffs into the lungs 2 times daily      losartan (COZAAR) 100 MG tablet Take 100 mg by mouth daily      pantoprazole (PROTONIX) 20 MG tablet Take 20 mg by mouth daily      albuterol sulfate HFA (VENTOLIN HFA) 108 (90 Base) MCG/ACT inhaler Inhale 2 puffs into the lungs every 6 hours as needed for Wheezing      vitamin D (CHOLECALCIFEROL) 25 MCG (1000 UT) TABS tablet Take 1,000 Units by mouth daily       Allergies   Allergen Reactions    Lisinopril     Pcn [Penicillins]     Sulfa Antibiotics        Nursing Notes Reviewed    Physical Exam:  Triage VS:    ED Triage Vitals [04/30/22 0253]   Enc Vitals Group      BP (!) 163/83      Pulse 70      Resp 18      Temp 98.2 °F (36.8 °C)      Temp Source Oral      SpO2 90 %      Weight       Height       Head Circumference       Peak Flow       Pain Score       Pain Loc       Pain Edu? Excl. in [address]? Physical Exam  Vitals and nursing note reviewed. Constitutional:       Appearance: Normal appearance. HENT:      Head: Normocephalic and atraumatic. Nose: Nose normal.      Mouth/Throat:      Mouth: Mucous membranes are moist.   Eyes:      Conjunctiva/sclera: Conjunctivae normal.   Cardiovascular:      Rate and Rhythm: Normal rate and regular rhythm. Pulses: Normal pulses. Pulmonary:      Effort: Pulmonary effort is normal. No respiratory distress. Breath sounds: Normal breath sounds. No wheezing or rhonchi. Abdominal:      General: Abdomen is flat. Bowel sounds are normal. There is no distension. Palpations: Abdomen is soft. Tenderness: There is no abdominal tenderness. There is no guarding or rebound. Musculoskeletal:      Comments: Left hip:  DP, PT pulses and distal capillary refill intact. Tender to palpation over greater trochanter and with internal and external rotation. Sensation intact. Compartments soft. No midline tenderness in the C, T, L-spine. Pelvis is stable. Skin:     General: Skin is warm. Capillary Refill: Capillary refill takes less than 2 seconds. Neurological:      Mental Status: She is alert and oriented to person, place, and time. Mental status is at baseline.    Psychiatric:         Mood and Affect: Mood normal.              I have reviewed and interpreted all of the currently available lab results from this visit (if applicable):  Results for orders placed or performed during the hospital encounter of 04/30/22   CBC with Auto Differential   Result Value Ref Range    WBC 9.4 4.0 - 10.5 K/CU MM    RBC 3.46 (L) 4.2 - 5.4 M/CU MM    Hemoglobin 10.4 (L) 12.5 - 16.0 GM/DL    Hematocrit 33.5 (L) 37 - 47 %    MCV 96.8 78 - 100 FL    MCH 30.1 27 - 31 PG    MCHC 31.0 (L) 32.0 - 36.0 %    RDW 14.0 11.7 - 14.9 %    Platelets 536 625 - 075 K/CU MM    MPV 8.9 7.5 - 11.1 FL    Differential Type AUTOMATED DIFFERENTIAL     Segs Relative 48.6 36 - 66 %    Lymphocytes % 35.6 24 - 44 %    Monocytes % 9.2 (H) 0 - 4 %    Eosinophils % 5.3 (H) 0 - 3 %    Basophils % 0.8 0 - 1 %    Segs Absolute 4.6 K/CU MM    Lymphocytes Absolute 3.4 K/CU MM    Monocytes Absolute 0.9 K/CU MM    Eosinophils Absolute 0.5 K/CU MM    Basophils Absolute 0.1 K/CU MM    Immature Neutrophil % 0.5 (H) 0 - 0.43 %    Total Immature Neutrophil 0.05 K/CU MM   Comprehensive Metabolic Panel w/ Reflex to MG   Result Value Ref Range    Sodium 140 135 - 145 MMOL/L    Potassium 4.1 3.5 - 5.1 MMOL/L    Chloride 105 99 - 110 mMol/L    CO2 27 21 - 32 MMOL/L    BUN 18 6 - 23 MG/DL    CREATININE 0.8 0.6 - 1.1 MG/DL    Glucose 111 (H) 70 - 99 MG/DL    Calcium 10.5 8.3 - 10.6 MG/DL    Albumin 3.8 3.4 - 5.0 GM/DL    Total Protein 6.0 (L) 6.4 - 8.2 GM/DL    Total Bilirubin 0.5 0.0 - 1.0 MG/DL    ALT 12 10 - 40 U/L    AST 18 15 - 37 IU/L    Alkaline Phosphatase 102 40 - 129 IU/L    GFR Non-African American >60 >60 mL/min/1.73m2    GFR African American >60 >60 mL/min/1.73m2    Anion Gap 8 4 - 16      Radiographs (if obtained):    [] Radiologist's Report Reviewed:  XR HIP 2-3 VW W PELVIS LEFT    (Results Pending)   CT HEAD WO CONTRAST    (Results Pending)   CT CERVICAL SPINE WO CONTRAST    (Results Pending)   XR CHEST PORTABLE    (Results Pending)           Chart review shows recent radiographs:  Echocardiogram complete 2D with doppler with color    Result Date: 2022  Transthoracic Echocardiography Report (TTE)  Demographics   Patient Name       Raoul Pryor Date of Study       2022   Date of Birth      1935        Gender              Female   Age                81 year(s)        Race                   Patient Number     [id number]        Room Number         009   Visit Number       [id number]   Corporate ID       [id number]   Accession Number   [id number]        Leora Cooper ARGENIS   Ordering Physician                   Interpreting        Сергей Crenshaw MD                                       Physician  Procedure Type of Study   TTE procedure:ECHOCARDIOGRAM COMPLETE 2D W DOPPLER W COLOR. Procedure Date Date: 2022 Start: 09:53 AM Study Location: Portable Technical Quality: Limited visualization due to body habitus. Indications:Chest pain. Patient Status: Routine Height: 69 inches Weight: 225 pounds BSA: 2.17 m2 BMI: 33.23 kg/m2 HR: 66 bpm BP: 175/93 mmHg  Conclusions   Summary  Left ventricular systolic function is normal.  Ejection fraction is visually estimated at 50%. Moderate left ventricular hypertrophy. Septal bounce noted. ( paradoxical motion due to bundle branch block)  Grade III diastolic dysfunction. Moderately dilated left atrium. PPM wiring visualized in right atrium. Large mitral annular calcification; mild mitral stenosis. Mean P mmHg. Mild to moderate tricuspid regurgitation; RVSP: 45 mmHg. No evidence of any pericardial effusion.    Signature   ------------------------------------------------------------------  Electronically signed by Сергей Crenshaw MD (Interpreting  physician) on 2022 at 01:12 PM  ------------------------------------------------------------------   Findings   Left Ventricle  Left ventricular systolic function is normal.  Ejection fraction is visually estimated at 50%. Moderate left ventricular hypertrophy. Septal bounce noted. Grade III diastolic dysfunction. Left ventricle size is normal.   Left Atrium  Moderately dilated left atrium. Right Atrium  PPM wiring visualized in right atrium. Right Ventricle  PPM wiring visualized within the right ventricle. Aortic Valve  Lambl's excresence noted on the aortic valve leaflet tips. Trace aortic  regurgitation. Mitral Valve  Large mitral annular calcification; mild mitral stenosis. Mean P mmHg. Tricuspid Valve  Mild to moderate tricuspid regurgitation; RVSP: 45 mmHg. Pulmonic Valve  Trace pulmonic regurgitation present. Pericardial Effusion  No evidence of any pericardial effusion. Pleural Effusion  No evidence of pleural effusion. Miscellaneous  IVC and abdominal aorta are within normal limits.   M-Mode/2D Measurements & Calculations   LV Diastolic Dimension:   LV Systolic Dimension:   LA Dimension: 5.2 cmAO  3.4 cm                    2.5 cm                   Root Dimension: 3.6 cm  LV FS:26.5 %              LV Volume Diastolic:  LV PW Diastolic: 1.7 cm   29.9 ml  LV PW Systolic: 2 cm      LV Volume Systolic: 91.7  Septum Diastolic: 1.7 cm  ml                       RV Diastolic Dimension:  Septum Systolic: 1.8 cm   LV EDV/LV EDV Index:     3.8 cm  CO: 5.77 l/min            39.3 ml/18 m2LV ESV/LV  CI: 2.66 l/m*m2           ESV Index: 15.6 ml/7 m2  LA/Aorta: 1.44                            EF Calculated (A4C):                            60.3 %                            EF Calculated (2D): 53 %                             LVOT: 2.2 cm  Doppler Measurements & Calculations   MV Peak E-Wave: 169     AV Peak Velocity: 173 cm/s  LVOT Peak Velocity: 127  cm/s                    AV Peak Gradient: 11.97     cm/s  MV Peak A-Wave: 84.6    mmHg                        LVOT Mean Velocity: 83.2  cm/s                    AV Mean Velocity: 127 cm/s  cm/s  MV E/A Ratio: 2         AV Mean Gradient: 7 mmHg    LVOT Peak Gradient: 6  MV Peak Gradient: 11.42 AV VTI: 36.2 cm             mmHgLVOT Mean Gradient:  mmHg                    AV Area (Continuity):2.41   3 mmHg  MV Mean Gradient: 5     cm2                         Estimated RVSP: 45 mmHg  mmHg                                                Estimated RAP:3 mmHg  MV Mean Velocity: 98.5  LVOT VTI: 23 cm  cm/s  MV P1/2t: 97 msec       Estimated PASP: 37.81 mmHg  TR Velocity:295 cm/s  MVA by PHT:2.27 cm2                                 TR Gradient:34.81 mmHg  MV Area (continuity):  1.8 cm2  MV E' Septal Velocity:  4.45 cm/s   MV E/E' septal: 37.98      XR CHEST PORTABLE    Result Date: 4/22/2022  EXAMINATION: ONE XRAY VIEW OF THE CHEST 4/22/2022 12:21 am COMPARISON: Chest radiograph dated March 15, 2021 HISTORY: ORDERING SYSTEM PROVIDED HISTORY: chest pain TECHNOLOGIST PROVIDED HISTORY: Reason for exam:->chest pain Reason for Exam: sob FINDINGS: Cardiomegaly is noted. Pulmonary vascular congestion is seen. A left-sided intracardiac device is noted. There is no definite focal consolidation or large pleural effusion. Cardiomegaly with pulmonary vascular congestion. MDM:  Patient seen and examined. Labs and imaging obtained. Patient without leukocytosis, hemoglobin stable, platelets stable. Electrolytes within acceptable limits. CT head, no acute intracranial hemorrhage, mass effect, midline shift, or acute hydrocephalus. CT cervical spine with no new acute fractures or dislocation. CXR with mild pulmonary vascular congestion, similar to recent study. No new consolidation, pleural effusions, or pneumothorax. Plain films of the left hip shows acute intertrochanteric fracture of the left femur with comminuted fragments of the greater and lesser trochanter. There is impaction and overlap at the site with proximal migration of the shaft. Patient is neurovascularly intact on the left side, with DP, PT pulses and capillary refill intact. Sensation is intact. Compartments are soft. During patient's ED course, she did have mild hypoxia, with SPO2 of 89 to 90%. She does not wear home O2. She denies any difficulty breathing or respiratory distress at this time. Lungs are clear. She is placed on 2 L nasal cannula for comfort. All labs and imaging discussed with patient. At this time patient to be transferred to [facility] for further evaluation and management. She is agreeable to this. Case discussed with Dr. Tyler Delacruz, orthopedic surgery, who agrees with consult. Case discussed with hospitalist, Dr. Jim Williamson, who agrees with admission. Clinical Impression:  1. Fall, initial encounter    2. Closed comminuted intertrochanteric fracture of left femur, initial encounter [facility])      Disposition referral (if applicable):  No follow-up provider specified. Disposition medications (if applicable):  New Prescriptions    No medications on file       Comment: Please note this report has been produced using speech recognition software and may contain errors related to that system including errors in grammar, punctuation, and spelling, as well as words and phrases that may be inappropriate. Efforts were made to edit the dictations.        Ray Holt DO  04/30/22 1370

## 2022-04-30 NOTE — ED NOTES
Patient semi fowlers in bed talking to sons at bedside, patient laughing and having a good time.       Alise Coombs RN  04/30/22 [id number]

## 2022-05-01 ENCOUNTER — ANESTHESIA (OUTPATIENT)
Dept: OPERATING ROOM | Age: 87
DRG: 480 | End: 2022-05-01
Payer: MEDICARE

## 2022-05-01 ENCOUNTER — ANESTHESIA EVENT (OUTPATIENT)
Dept: OPERATING ROOM | Age: 87
DRG: 480 | End: 2022-05-01
Payer: MEDICARE

## 2022-05-01 ENCOUNTER — APPOINTMENT (OUTPATIENT)
Dept: GENERAL RADIOLOGY | Age: 87
DRG: 480 | End: 2022-05-01
Attending: INTERNAL MEDICINE
Payer: MEDICARE

## 2022-05-01 VITALS — DIASTOLIC BLOOD PRESSURE: 55 MMHG | SYSTOLIC BLOOD PRESSURE: 98 MMHG | OXYGEN SATURATION: 85 %

## 2022-05-01 PROBLEM — T14.8XXA FRACTURE: Status: ACTIVE | Noted: 2022-05-01

## 2022-05-01 LAB
ALBUMIN SERPL-MCNC: 3.9 GM/DL (ref 3.4–5)
ALP BLD-CCNC: 100 IU/L (ref 40–129)
ALT SERPL-CCNC: 10 U/L (ref 10–40)
ANION GAP SERPL CALCULATED.3IONS-SCNC: 15 MMOL/L (ref 4–16)
AST SERPL-CCNC: 18 IU/L (ref 15–37)
BASOPHILS ABSOLUTE: 0.1 K/CU MM
BASOPHILS RELATIVE PERCENT: 0.5 % (ref 0–1)
BILIRUB SERPL-MCNC: 0.9 MG/DL (ref 0–1)
BUN BLDV-MCNC: 21 MG/DL (ref 6–23)
CALCIUM SERPL-MCNC: 10.1 MG/DL (ref 8.3–10.6)
CHLORIDE BLD-SCNC: 99 MMOL/L (ref 99–110)
CO2: 21 MMOL/L (ref 21–32)
CREAT SERPL-MCNC: 0.8 MG/DL (ref 0.6–1.1)
DIFFERENTIAL TYPE: ABNORMAL
EOSINOPHILS ABSOLUTE: 0 K/CU MM
EOSINOPHILS RELATIVE PERCENT: 0.2 % (ref 0–3)
GFR AFRICAN AMERICAN: >60 ML/MIN/1.73M2
GFR NON-AFRICAN AMERICAN: >60 ML/MIN/1.73M2
GLUCOSE BLD-MCNC: 102 MG/DL (ref 70–99)
GLUCOSE BLD-MCNC: 119 MG/DL (ref 70–99)
GLUCOSE BLD-MCNC: 130 MG/DL (ref 70–99)
GLUCOSE BLD-MCNC: 137 MG/DL (ref 70–99)
GLUCOSE BLD-MCNC: 143 MG/DL (ref 70–99)
GLUCOSE BLD-MCNC: 143 MG/DL (ref 70–99)
GLUCOSE BLD-MCNC: 149 MG/DL (ref 70–99)
GLUCOSE BLD-MCNC: 91 MG/DL (ref 70–99)
HCT VFR BLD CALC: 33.5 % (ref 37–47)
HEMOGLOBIN: 10.5 GM/DL (ref 12.5–16)
IMMATURE NEUTROPHIL %: 0.8 % (ref 0–0.43)
LYMPHOCYTES ABSOLUTE: 1.3 K/CU MM
LYMPHOCYTES RELATIVE PERCENT: 8.4 % (ref 24–44)
MCH RBC QN AUTO: 31 PG (ref 27–31)
MCHC RBC AUTO-ENTMCNC: 31.3 % (ref 32–36)
MCV RBC AUTO: 98.8 FL (ref 78–100)
MONOCYTES ABSOLUTE: 1.7 K/CU MM
MONOCYTES RELATIVE PERCENT: 11.1 % (ref 0–4)
NUCLEATED RBC %: 0 %
PDW BLD-RTO: 13.8 % (ref 11.7–14.9)
PLATELET # BLD: 230 K/CU MM (ref 140–440)
PMV BLD AUTO: 9.5 FL (ref 7.5–11.1)
POTASSIUM SERPL-SCNC: 4.5 MMOL/L (ref 3.5–5.1)
RBC # BLD: 3.39 M/CU MM (ref 4.2–5.4)
SEGMENTED NEUTROPHILS ABSOLUTE COUNT: 12.1 K/CU MM
SEGMENTED NEUTROPHILS RELATIVE PERCENT: 79 % (ref 36–66)
SODIUM BLD-SCNC: 135 MMOL/L (ref 135–145)
TOTAL IMMATURE NEUTOROPHIL: 0.13 K/CU MM
TOTAL NUCLEATED RBC: 0 K/CU MM
TOTAL PROTEIN: 5.8 GM/DL (ref 6.4–8.2)
WBC # BLD: 15.3 K/CU MM (ref 4–10.5)

## 2022-05-01 PROCEDURE — 97530 THERAPEUTIC ACTIVITIES: CPT

## 2022-05-01 PROCEDURE — 2580000003 HC RX 258: Performed by: ORTHOPAEDIC SURGERY

## 2022-05-01 PROCEDURE — P9045 ALBUMIN (HUMAN), 5%, 250 ML: HCPCS

## 2022-05-01 PROCEDURE — 2500000003 HC RX 250 WO HCPCS: Performed by: ORTHOPAEDIC SURGERY

## 2022-05-01 PROCEDURE — 27245 TREAT THIGH FRACTURE: CPT | Performed by: ORTHOPAEDIC SURGERY

## 2022-05-01 PROCEDURE — P9045 ALBUMIN (HUMAN), 5%, 250 ML: HCPCS | Performed by: NURSE ANESTHETIST, CERTIFIED REGISTERED

## 2022-05-01 PROCEDURE — 6360000002 HC RX W HCPCS: Performed by: NURSE ANESTHETIST, CERTIFIED REGISTERED

## 2022-05-01 PROCEDURE — 97166 OT EVAL MOD COMPLEX 45 MIN: CPT

## 2022-05-01 PROCEDURE — 6370000000 HC RX 637 (ALT 250 FOR IP): Performed by: ORTHOPAEDIC SURGERY

## 2022-05-01 PROCEDURE — 1200000000 HC SEMI PRIVATE

## 2022-05-01 PROCEDURE — 85025 COMPLETE CBC W/AUTO DIFF WBC: CPT

## 2022-05-01 PROCEDURE — 36415 COLL VENOUS BLD VENIPUNCTURE: CPT

## 2022-05-01 PROCEDURE — 94761 N-INVAS EAR/PLS OXIMETRY MLT: CPT

## 2022-05-01 PROCEDURE — 2500000003 HC RX 250 WO HCPCS: Performed by: ANESTHESIOLOGY

## 2022-05-01 PROCEDURE — 6360000002 HC RX W HCPCS: Performed by: INTERNAL MEDICINE

## 2022-05-01 PROCEDURE — 2580000003 HC RX 258: Performed by: NURSE ANESTHETIST, CERTIFIED REGISTERED

## 2022-05-01 PROCEDURE — 2500000003 HC RX 250 WO HCPCS: Performed by: NURSE ANESTHETIST, CERTIFIED REGISTERED

## 2022-05-01 PROCEDURE — 6370000000 HC RX 637 (ALT 250 FOR IP): Performed by: INTERNAL MEDICINE

## 2022-05-01 PROCEDURE — 99222 1ST HOSP IP/OBS MODERATE 55: CPT | Performed by: ORTHOPAEDIC SURGERY

## 2022-05-01 PROCEDURE — 2780000010 HC IMPLANT OTHER: Performed by: ORTHOPAEDIC SURGERY

## 2022-05-01 PROCEDURE — 3600000004 HC SURGERY LEVEL 4 BASE: Performed by: ORTHOPAEDIC SURGERY

## 2022-05-01 PROCEDURE — C1769 GUIDE WIRE: HCPCS | Performed by: ORTHOPAEDIC SURGERY

## 2022-05-01 PROCEDURE — 7100000001 HC PACU RECOVERY - ADDTL 15 MIN: Performed by: ORTHOPAEDIC SURGERY

## 2022-05-01 PROCEDURE — 2709999900 HC NON-CHARGEABLE SUPPLY: Performed by: ORTHOPAEDIC SURGERY

## 2022-05-01 PROCEDURE — 0QH706Z INSERTION OF INTRAMEDULLARY INTERNAL FIXATION DEVICE INTO LEFT UPPER FEMUR, OPEN APPROACH: ICD-10-PCS | Performed by: ORTHOPAEDIC SURGERY

## 2022-05-01 PROCEDURE — 3600000014 HC SURGERY LEVEL 4 ADDTL 15MIN: Performed by: ORTHOPAEDIC SURGERY

## 2022-05-01 PROCEDURE — 2720000010 HC SURG SUPPLY STERILE: Performed by: ORTHOPAEDIC SURGERY

## 2022-05-01 PROCEDURE — 94664 DEMO&/EVAL PT USE INHALER: CPT

## 2022-05-01 PROCEDURE — 2700000000 HC OXYGEN THERAPY PER DAY

## 2022-05-01 PROCEDURE — 76000 FLUOROSCOPY <1 HR PHYS/QHP: CPT

## 2022-05-01 PROCEDURE — 3700000001 HC ADD 15 MINUTES (ANESTHESIA): Performed by: ORTHOPAEDIC SURGERY

## 2022-05-01 PROCEDURE — 6360000002 HC RX W HCPCS: Performed by: ORTHOPAEDIC SURGERY

## 2022-05-01 PROCEDURE — 6360000002 HC RX W HCPCS

## 2022-05-01 PROCEDURE — 80053 COMPREHEN METABOLIC PANEL: CPT

## 2022-05-01 PROCEDURE — 7100000000 HC PACU RECOVERY - FIRST 15 MIN: Performed by: ORTHOPAEDIC SURGERY

## 2022-05-01 PROCEDURE — C1713 ANCHOR/SCREW BN/BN,TIS/BN: HCPCS | Performed by: ORTHOPAEDIC SURGERY

## 2022-05-01 PROCEDURE — 3700000000 HC ANESTHESIA ATTENDED CARE: Performed by: ORTHOPAEDIC SURGERY

## 2022-05-01 PROCEDURE — 82962 GLUCOSE BLOOD TEST: CPT

## 2022-05-01 PROCEDURE — 97162 PT EVAL MOD COMPLEX 30 MIN: CPT

## 2022-05-01 PROCEDURE — 94150 VITAL CAPACITY TEST: CPT

## 2022-05-01 PROCEDURE — 27245 TREAT THIGH FRACTURE: CPT

## 2022-05-01 DEVICE — IMPLANTABLE DEVICE: Type: IMPLANTABLE DEVICE | Site: HIP | Status: FUNCTIONAL

## 2022-05-01 DEVICE — NAIL IM L400MM DIA12MM 125DEG LNG L PROX FEM GRN TI CANN: Type: IMPLANTABLE DEVICE | Site: HIP | Status: FUNCTIONAL

## 2022-05-01 DEVICE — SCREW BNE L42MM DIA5MM TIB LT GRN TI ST CANN LOK FULL THRD: Type: IMPLANTABLE DEVICE | Site: HIP | Status: FUNCTIONAL

## 2022-05-01 RX ORDER — BUPIVACAINE HYDROCHLORIDE 7.5 MG/ML
INJECTION, SOLUTION INTRASPINAL
Status: COMPLETED | OUTPATIENT
Start: 2022-05-01 | End: 2022-05-01

## 2022-05-01 RX ORDER — PROPOFOL 10 MG/ML
INJECTION, EMULSION INTRAVENOUS PRN
Status: DISCONTINUED | OUTPATIENT
Start: 2022-05-01 | End: 2022-05-01 | Stop reason: SDUPTHER

## 2022-05-01 RX ORDER — SODIUM CHLORIDE, SODIUM LACTATE, POTASSIUM CHLORIDE, CALCIUM CHLORIDE 600; 310; 30; 20 MG/100ML; MG/100ML; MG/100ML; MG/100ML
INJECTION, SOLUTION INTRAVENOUS CONTINUOUS
Status: DISCONTINUED | OUTPATIENT
Start: 2022-05-01 | End: 2022-05-04

## 2022-05-01 RX ORDER — ACETAMINOPHEN 650 MG/1
650 SUPPOSITORY RECTAL EVERY 6 HOURS PRN
Status: DISCONTINUED | OUTPATIENT
Start: 2022-05-01 | End: 2022-05-14 | Stop reason: HOSPADM

## 2022-05-01 RX ORDER — CLINDAMYCIN PHOSPHATE 600 MG/50ML
600 INJECTION INTRAVENOUS EVERY 8 HOURS
Status: COMPLETED | OUTPATIENT
Start: 2022-05-01 | End: 2022-05-01

## 2022-05-01 RX ORDER — ALBUMIN, HUMAN INJ 5% 5 %
25 SOLUTION INTRAVENOUS ONCE
Status: COMPLETED | OUTPATIENT
Start: 2022-05-01 | End: 2022-05-01

## 2022-05-01 RX ORDER — SODIUM CHLORIDE, SODIUM LACTATE, POTASSIUM CHLORIDE, CALCIUM CHLORIDE 600; 310; 30; 20 MG/100ML; MG/100ML; MG/100ML; MG/100ML
INJECTION, SOLUTION INTRAVENOUS CONTINUOUS PRN
Status: DISCONTINUED | OUTPATIENT
Start: 2022-05-01 | End: 2022-05-01 | Stop reason: SDUPTHER

## 2022-05-01 RX ORDER — ENOXAPARIN SODIUM 100 MG/ML
40 INJECTION SUBCUTANEOUS DAILY
Status: DISCONTINUED | OUTPATIENT
Start: 2022-05-02 | End: 2022-05-03

## 2022-05-01 RX ORDER — LIDOCAINE HYDROCHLORIDE 20 MG/ML
INJECTION, SOLUTION EPIDURAL; INFILTRATION; INTRACAUDAL; PERINEURAL PRN
Status: DISCONTINUED | OUTPATIENT
Start: 2022-05-01 | End: 2022-05-01 | Stop reason: SDUPTHER

## 2022-05-01 RX ORDER — MORPHINE SULFATE 2 MG/ML
2 INJECTION, SOLUTION INTRAMUSCULAR; INTRAVENOUS
Status: DISCONTINUED | OUTPATIENT
Start: 2022-05-01 | End: 2022-05-01 | Stop reason: ALTCHOICE

## 2022-05-01 RX ORDER — ACETAMINOPHEN 325 MG/1
650 TABLET ORAL EVERY 4 HOURS PRN
Status: DISCONTINUED | OUTPATIENT
Start: 2022-05-01 | End: 2022-05-14 | Stop reason: HOSPADM

## 2022-05-01 RX ORDER — ACETAMINOPHEN 325 MG/1
650 TABLET ORAL EVERY 6 HOURS PRN
Status: DISCONTINUED | OUTPATIENT
Start: 2022-05-01 | End: 2022-05-14 | Stop reason: HOSPADM

## 2022-05-01 RX ORDER — HYDROCODONE BITARTRATE AND ACETAMINOPHEN 5; 325 MG/1; MG/1
1 TABLET ORAL EVERY 4 HOURS PRN
Status: DISCONTINUED | OUTPATIENT
Start: 2022-05-01 | End: 2022-05-06

## 2022-05-01 RX ORDER — CLINDAMYCIN PHOSPHATE 150 MG/ML
INJECTION, SOLUTION INTRAVENOUS PRN
Status: DISCONTINUED | OUTPATIENT
Start: 2022-05-01 | End: 2022-05-01 | Stop reason: SDUPTHER

## 2022-05-01 RX ORDER — HYDRALAZINE HYDROCHLORIDE 20 MG/ML
10 INJECTION INTRAMUSCULAR; INTRAVENOUS EVERY 6 HOURS PRN
Status: DISCONTINUED | OUTPATIENT
Start: 2022-05-01 | End: 2022-05-01 | Stop reason: ALTCHOICE

## 2022-05-01 RX ORDER — LIDOCAINE HYDROCHLORIDE 20 MG/ML
INJECTION, SOLUTION INTRAVENOUS PRN
Status: DISCONTINUED | OUTPATIENT
Start: 2022-05-01 | End: 2022-05-01 | Stop reason: SDUPTHER

## 2022-05-01 RX ORDER — ALBUMIN, HUMAN INJ 5% 5 %
SOLUTION INTRAVENOUS
Status: COMPLETED
Start: 2022-05-01 | End: 2022-05-01

## 2022-05-01 RX ORDER — ALBUMIN, HUMAN INJ 5% 5 %
SOLUTION INTRAVENOUS PRN
Status: DISCONTINUED | OUTPATIENT
Start: 2022-05-01 | End: 2022-05-01 | Stop reason: SDUPTHER

## 2022-05-01 RX ORDER — MORPHINE SULFATE 4 MG/ML
4 INJECTION, SOLUTION INTRAMUSCULAR; INTRAVENOUS ONCE
Status: COMPLETED | OUTPATIENT
Start: 2022-05-01 | End: 2022-05-01

## 2022-05-01 RX ORDER — OXYCODONE HYDROCHLORIDE 5 MG/1
5 TABLET ORAL EVERY 4 HOURS PRN
Status: DISCONTINUED | OUTPATIENT
Start: 2022-05-01 | End: 2022-05-06

## 2022-05-01 RX ADMIN — ALBUMIN (HUMAN) 250 ML: 12.5 INJECTION, SOLUTION INTRAVENOUS at 08:44

## 2022-05-01 RX ADMIN — LOSARTAN POTASSIUM 100 MG: 100 TABLET, FILM COATED ORAL at 11:30

## 2022-05-01 RX ADMIN — PANTOPRAZOLE 20 MG: 20 TABLET, DELAYED RELEASE ORAL at 11:29

## 2022-05-01 RX ADMIN — LIDOCAINE HYDROCHLORIDE 60 MG: 20 INJECTION, SOLUTION INTRAVENOUS at 07:51

## 2022-05-01 RX ADMIN — HYDROMORPHONE HYDROCHLORIDE 0.5 MG: 1 INJECTION, SOLUTION INTRAMUSCULAR; INTRAVENOUS; SUBCUTANEOUS at 13:11

## 2022-05-01 RX ADMIN — PHENYLEPHRINE HYDROCHLORIDE 100 MCG: 10 INJECTION INTRAVENOUS at 08:40

## 2022-05-01 RX ADMIN — AMLODIPINE BESYLATE 10 MG: 10 TABLET ORAL at 11:31

## 2022-05-01 RX ADMIN — FERROUS SULFATE TAB 325 MG (65 MG ELEMENTAL FE) 325 MG: 325 (65 FE) TAB at 11:30

## 2022-05-01 RX ADMIN — Medication 1000 UNITS: at 11:30

## 2022-05-01 RX ADMIN — PHENYLEPHRINE HYDROCHLORIDE 100 MCG: 10 INJECTION INTRAVENOUS at 08:46

## 2022-05-01 RX ADMIN — SODIUM CHLORIDE, POTASSIUM CHLORIDE, SODIUM LACTATE AND CALCIUM CHLORIDE: 600; 310; 30; 20 INJECTION, SOLUTION INTRAVENOUS at 20:42

## 2022-05-01 RX ADMIN — MORPHINE SULFATE 4 MG: 4 INJECTION INTRAVENOUS at 01:08

## 2022-05-01 RX ADMIN — OXYCODONE HYDROCHLORIDE 2.5 MG: 5 TABLET ORAL at 00:54

## 2022-05-01 RX ADMIN — LIDOCAINE HYDROCHLORIDE 3 ML: 20 INJECTION, SOLUTION EPIDURAL; INFILTRATION; INTRACAUDAL at 07:51

## 2022-05-01 RX ADMIN — PROPOFOL 30 MG: 10 INJECTION, EMULSION INTRAVENOUS at 07:50

## 2022-05-01 RX ADMIN — SERTRALINE HYDROCHLORIDE 25 MG: 50 TABLET ORAL at 21:09

## 2022-05-01 RX ADMIN — ATORVASTATIN CALCIUM 10 MG: 10 TABLET, FILM COATED ORAL at 11:30

## 2022-05-01 RX ADMIN — CLINDAMYCIN PHOSPHATE 900 MG: 150 INJECTION, SOLUTION INTRAMUSCULAR; INTRAVENOUS at 08:04

## 2022-05-01 RX ADMIN — INSULIN LISPRO 1 UNITS: 100 INJECTION, SOLUTION INTRAVENOUS; SUBCUTANEOUS at 21:13

## 2022-05-01 RX ADMIN — HYDROMORPHONE HYDROCHLORIDE 0.5 MG: 1 INJECTION, SOLUTION INTRAMUSCULAR; INTRAVENOUS; SUBCUTANEOUS at 21:08

## 2022-05-01 RX ADMIN — SODIUM CHLORIDE, POTASSIUM CHLORIDE, SODIUM LACTATE AND CALCIUM CHLORIDE: 600; 310; 30; 20 INJECTION, SOLUTION INTRAVENOUS at 07:43

## 2022-05-01 RX ADMIN — BUPIVACAINE HYDROCHLORIDE IN DEXTROSE 1.6 ML: 7.5 INJECTION, SOLUTION SUBARACHNOID at 08:13

## 2022-05-01 RX ADMIN — SODIUM CHLORIDE, POTASSIUM CHLORIDE, SODIUM LACTATE AND CALCIUM CHLORIDE: 600; 310; 30; 20 INJECTION, SOLUTION INTRAVENOUS at 10:06

## 2022-05-01 RX ADMIN — Medication 400 UNITS: at 11:33

## 2022-05-01 RX ADMIN — HYDROCODONE BITARTRATE AND ACETAMINOPHEN 1 TABLET: 5; 325 TABLET ORAL at 11:29

## 2022-05-01 RX ADMIN — ALBUMIN, HUMAN INJ 5% 25 G: 5 SOLUTION at 09:37

## 2022-05-01 RX ADMIN — PHENYLEPHRINE HYDROCHLORIDE 100 MCG: 10 INJECTION INTRAVENOUS at 08:06

## 2022-05-01 RX ADMIN — CLINDAMYCIN PHOSPHATE 600 MG: 600 INJECTION, SOLUTION INTRAVENOUS at 14:06

## 2022-05-01 RX ADMIN — ALBUMIN (HUMAN) 25 G: 12.5 INJECTION, SOLUTION INTRAVENOUS at 09:37

## 2022-05-01 RX ADMIN — DONEPEZIL HYDROCHLORIDE 5 MG: 5 TABLET, FILM COATED ORAL at 21:09

## 2022-05-01 RX ADMIN — CLINDAMYCIN PHOSPHATE 600 MG: 600 INJECTION, SOLUTION INTRAVENOUS at 21:07

## 2022-05-01 RX ADMIN — PROPOFOL 20 MG: 10 INJECTION, EMULSION INTRAVENOUS at 07:52

## 2022-05-01 RX ADMIN — PHENYLEPHRINE HYDROCHLORIDE 100 MCG: 10 INJECTION INTRAVENOUS at 08:18

## 2022-05-01 ASSESSMENT — PULMONARY FUNCTION TESTS
PIF_VALUE: 1
PIF_VALUE: 1
PIF_VALUE: 2
PIF_VALUE: 1
PIF_VALUE: 2
PIF_VALUE: 1
PIF_VALUE: 0
PIF_VALUE: 1
PIF_VALUE: 2
PIF_VALUE: 6
PIF_VALUE: 1
PIF_VALUE: 0
PIF_VALUE: 1
PIF_VALUE: 2
PIF_VALUE: 1
PIF_VALUE: 0
PIF_VALUE: 1
PIF_VALUE: 0
PIF_VALUE: 1

## 2022-05-01 ASSESSMENT — PAIN DESCRIPTION - FREQUENCY
FREQUENCY: CONTINUOUS

## 2022-05-01 ASSESSMENT — PAIN SCALES - GENERAL
PAINLEVEL_OUTOF10: 5
PAINLEVEL_OUTOF10: 0
PAINLEVEL_OUTOF10: 0
PAINLEVEL_OUTOF10: 10
PAINLEVEL_OUTOF10: 10

## 2022-05-01 ASSESSMENT — PAIN DESCRIPTION - LOCATION
LOCATION: HIP

## 2022-05-01 ASSESSMENT — PAIN DESCRIPTION - PAIN TYPE
TYPE: SURGICAL PAIN

## 2022-05-01 ASSESSMENT — PAIN - FUNCTIONAL ASSESSMENT
PAIN_FUNCTIONAL_ASSESSMENT: PREVENTS OR INTERFERES SOME ACTIVE ACTIVITIES AND ADLS
PAIN_FUNCTIONAL_ASSESSMENT: PREVENTS OR INTERFERES SOME ACTIVE ACTIVITIES AND ADLS

## 2022-05-01 ASSESSMENT — PAIN DESCRIPTION - DESCRIPTORS
DESCRIPTORS: ACHING
DESCRIPTORS: SHARP
DESCRIPTORS: ACHING

## 2022-05-01 ASSESSMENT — PAIN DESCRIPTION - ORIENTATION
ORIENTATION: LEFT

## 2022-05-01 ASSESSMENT — PAIN DESCRIPTION - ONSET
ONSET: ON-GOING

## 2022-05-01 ASSESSMENT — PAIN SCALES - WONG BAKER: WONGBAKER_NUMERICALRESPONSE: 8

## 2022-05-01 NOTE — PLAN OF CARE
Problem: Discharge Planning  Goal: Discharge to home or other facility with appropriate resources  Outcome: Progressing     Problem: Pain  Goal: Verbalizes/displays adequate comfort level or baseline comfort level  Outcome: Progressing     Problem: Chronic Conditions and Co-morbidities  Goal: Patient's chronic conditions and co-morbidity symptoms are monitored and maintained or improved  Outcome: Progressing

## 2022-05-01 NOTE — PROGRESS NOTES
Low urine output noted from griffin catheter. Total of 230mL emptied in OR including urine emptied from 2315 last night and on. Only about 75mL out since 10am. Griffin was irrigated and is patent without kinks. Dr Eulalio Curling notified, ordered to monitor output closely. Patient is receiving IV fluids.

## 2022-05-01 NOTE — PROGRESS NOTES
Physical Therapy  Facility/Department: [facility]  Physical Therapy Initial Assessment    Name: Duane Del Angel  : 1935  MRN: [id number]  Date of Service: 2022    Discharge Recommendations:  Subacute/Skilled Nursing Facility   PT Equipment Recommendations  Equipment Needed:  (pend to next LOC)      Patient Diagnosis(es): There were no encounter diagnoses. Past Medical History:  has a past medical history of Arthrofibrosis of total knee arthroplasty ([address] Asthma, CAD (coronary artery disease), CHF (congestive heart failure) ([address] Constipation, DDD (degenerative disc disease), cervical, Degenerative disc disease, cervical, Diabetes mellitus ([facility] Femur fracture, right ([facility] GERD (gastroesophageal reflux disease), Hypertension, and Venous stasis. Past Surgical History:  has a past surgical history that includes knee surgery; Appendectomy; Pacemaker insertion; pacemaker placement (2020); and Pacemaker insertion (N/A, 2020). Assessment   Body Structures, Functions, Activity Limitations Requiring Skilled Therapeutic Intervention: Decreased functional mobility ; Decreased ADL status; Decreased ROM; Decreased balance;Decreased endurance;Decreased cognition;Decreased safe awareness;Decreased strength; Increased pain;Decreased posture  Assessment: Pt presents 1 day s/p admission for mechanical fall on L side at [facility] while ambulating back from ; suffered L femoral intertrochanteric fx, POD 1 IM nailing, WBAT on LLE. Pt is a questionable hx and will need further clarification regarding PLOF and use of AD. Pt presents w/ the above listed deficits, however, at this time she is primarily limited by pain. Pt requires consistently heavy assist, demonstrates intermittently unsafe behaviors, impaired cognition, inc pain affecting command follow, disoriented to place/year, ROM and functional mobility limited by swelling over surgical leg. Recommending SNF.   Therapy Prognosis: Good  Decision Making: Medium Complexity  Requires PT Follow-Up: Yes  Activity Tolerance  Activity Tolerance: Treatment limited secondary to decreased cognition;Patient limited by pain     Treatment:  Therapeutic Activity Training:   Therapeutic activity training was instructed today. Cues were given for safety, sequence, UE/LE placement, awareness, and balance. Activities performed today included bed mobility training, sup-sit, sit-stand, SPT. Initiated functional mobility, balance, OOB activities this session      Plan   Plan  Plan:  (5+)  Plan weeks: 1  Current Treatment Recommendations: Strengthening,ROM,Balance training,Functional mobility training,Transfer training,Gait training,Stair training,ADL/Self-care training,Neuromuscular re-education,Endurance training,Cognitive/Perceptual training,Cognitive reorientation,Patient/Caregiver education & training,Safety education & training,Home exercise program,Pain management,Equipment evaluation, education, & procurement,Modalities,Positioning,Therapeutic activities  Safety Devices  Type of Devices: Bed alarm in place,Call light within reach,Gait belt,Patient at risk for falls,Left in bed  Restraints  Restraints Initially in Place: No     Restrictions  Restrictions/Precautions  Restrictions/Precautions: Fall Risk,General Precautions,Weight Bearing  Lower Extremity Weight Bearing Restrictions  Left Lower Extremity Weight Bearing: Weight Bearing As Tolerated  Upper Extremity Weight Bearing Restrictions  Other: 4 L of nc O2 in place on eval     Subjective   General  Chart Reviewed: Yes  Patient assessed for rehabilitation services?: Yes  Family / Caregiver Present: No  Follows Commands: Within Functional Limits (w/ mod cues for redirection)  Subjective  Subjective:  Oh I'm so sorry (repeated t/o)         Social/Functional History  Social/Functional History  Additional Comments: disoriented, confused, answers changing during assessment ; states was ind w/ mobility w/o device prev, assist w/ ADL's home mgmt; from [facility], will need further info clarified 2/2 impaired cog    Cognition   Orientation  Overall Orientation Status: Impaired  Orientation Level: Oriented to person;Oriented to situation;Disoriented to time;Disoriented to place  Cognition  Overall Cognitive Status: Exceptions  Arousal/Alertness: Delayed responses to stimuli (lethargic s/p pain medication administration)  Following Commands: Follows one step commands with increased time; Follows multistep commands with repitition  Attention Span: Difficulty attending to directions  Memory: Decreased recall of recent events;Decreased short term memory;Decreased recall of precautions  Safety Judgement: Good awareness of safety precautions  Problem Solving: Assistance required to identify errors made;Assistance required to implement solutions  Insights: Decreased awareness of deficits  Initiation: Requires cues for some  Sequencing: Requires cues for some  Cognition Comment: confused, disoriented, delayed cognition, intermittently difficult to redirect     Objective   Pulse: 60  Heart Rate Source: Telemetry  BP: (!) 107/54  BP Location: Right upper arm  BP Method: Automatic  Patient Position: Semi fowlers  MAP (Calculated): 71.67  Resp: 12  SpO2: 93 %  O2 Device: Nasal cannula     Observation/Palpation  Posture: Fair (kyphotic, fwd head and shoulders)  Observation: Supine, lethargic, agreeable. She is confused and requires inc time/cuing to improve command follow. Tele, BP cuff, 4 L of nc O2, bed alarm, griffin in place.         AROM RLE (degrees)  RLE AROM: WFL  PROM LLE (degrees)  LLE General PROM: ~80* knee flexion, ~5* knee extension ; hip ankle WFL  AROM LLE (degrees)  LLE General AROM: knee same as listed for PROM; ankle hip WFL  Strength RLE  Comment: grossly >3/5 as observed by functional mobility ; cog limiting some assessment  Strength LLE  Comment: grossly 3-/5           Bed mobility  Supine to Sit: Maximum assistance;2 Person assistance  Sit to Supine: Maximum assistance;2 Person assistance  Scooting: Maximal assistance;2 Person assistance  Transfers  Sit to Stand:  Moderate Assistance;2 Person Assistance  Stand to sit: 2 Person Assistance;Minimal Assistance  Ambulation  Comments: NT 2/2 safety concerns and inc difficulty and time to command follow w/ inc pain ; guards and becomes rigid requiring max cues to redirect away from pain     Balance  Sitting - Static: Fair;- (intermittent min A to attend to retropulsion)  Sitting - Dynamic: Poor;+  Standing - Static: Poor;+    AM-PAC Score  AM-PAC Inpatient Mobility Raw Score : 10 (05/01/22 1519)  AM-PAC Inpatient T-Scale Score : 32.29 (05/01/22 1519)  Mobility Inpatient CMS 0-100% Score: 76.75 (05/01/22 1519)  Mobility Inpatient CMS G-Code Modifier : CL (05/01/22 1519)          Goals  Long Term Goals  Time Frame for Long term goals : 1 week  Long term goal 1: pt will complete bed mobility at mod A  Long term goal 2: pt will complete transfers at min A  Long term goal 3: pt will ambulate 25 ft using FWW at min A  Patient Goals   Patient goals : return home       Therapy Time   Individual Concurrent Group Co-treatment   Time In 1332         Time Out 1358         Minutes 26         Timed Code Treatment Minutes: 16 Minutes (TA)       Immanuel Loya, PT, DPT

## 2022-05-01 NOTE — CARE COORDINATION
Chart reviewed. Pt from [facility]. Pt has PCP and insurance. Pt had femur nail taniya insertion today. Pt will need PT / OT evaluations and recommendations for discharge plan. Pt / OT have been ordered. WB posted for PT / OT to see pt when appropriate.

## 2022-05-01 NOTE — PROGRESS NOTES
It just came to my attention that patient was not seen by any admitting physician yesterday (signed and held orders were released but no physician was notified to see patient; there was nursing communication placed to notify physician when patient arrives to the unit). It appears that there was no page sent to the hospitalist group when patient arrived to [address] [place] [address] around 11:37 AM on 4/30/2022. Orthopedic surgery group has seen patient on 4/30/2022. I was able to see this only because I received the request for admission on 4/29/2022 and Epic is notifying me to dictate H&P. Will see patient now so H&P can be completed.       SIGNED: Nitesh Hi MD, MPH . 5/1/2022

## 2022-05-01 NOTE — PROGRESS NOTES
Patient returned from surgery at this time. Vital signs stable. 3 incision sites to left leg intact and covered with aquacel dressings. Patient is alert and oriented to place, person, and year. Disoriented to month and situation at times. Intermittently forgets that she had surgery today and wonders why her hip hurts. Patient is able to be reoriented for a short period of time. Son is at bedside. Patient is confused at baseline.

## 2022-05-01 NOTE — H&P
Hospital Medicine History and Physical      Date of Admission: 4/30/2022    Date of Service: Pt seen/examined on 5/1/2022 and admitted to inpatient. Assessment/plan:  1. Left intertrochanteric femur fracture, initial encounter.  Secondary to mechanical fall (ambulated without her walker).  Will place on as needed pain medicines.  Maintain on fall precautions.  Orthopedic surgery consult this morning. 2. Fall at nursing home, initial encounter.  This appears to be mechanical in nature.  Will place on fall precautions. 3. Well-controlled diabetes type 2 with most recent hemoglobin A1c of 6.0.  Will place on sliding scale insulin.  Monitor Accu-Chek closely. 4. Essential hypertension. Blood pressure is currently uncontrolled, likely due to pain. Adjusted pain meds for better pain control. Continue home antihypertensive regimen. Add PRN IV hydralazine. 5. Other comorbidities:  history of CAD, chronic diastolic heart failure (most recent echocardiogram with grade 3 diastolic dysfunction), GERD, dementia, essential hypertension, normocytic anemia. Activities: Up with assist  Prophylaxis: SC lovenox  Code status: Full code    ==========================================================  Chief complaint:  Fall, left hip pain    History of Presenting Illness:   This is a pleasant 26-year-old female with history of CAD, chronic diastolic heart failure (most recent echocardiogram with grade 3 diastolic dysfunction), dementia, well-controlled diabetes type 2 (most recent hemoglobin A1c of 6.0), GERD, essential hypertension, normocytic anemia, who presents to the emergency room (at [facility]) with complaints of fall, left hip pain.  Patient ambulated to the bathroom without her walker; states she forgets that she is not supposed to ambulate without assistance.  While she was being walked (with the assistance of a staff at the facility) back into her room, she tripped and sustained a fall.  She was referred to the emergency room at [facility] where she was found to have intertrochanteric left femur fracture on x-ray.  CThead is unremarkable for intracranial pathology.  Orthopedic surgeon, Dr. Smita West consulted from the emergency room, planning evaluation for possible surgery today. Past Medical History:      Diagnosis Date    Arthrofibrosis of total knee arthroplasty ([facility]     bilateral    Asthma     CAD (coronary artery disease)     [facility]     Constipation     DDD (degenerative disc disease), cervical     Degenerative disc disease, cervical     Diabetes mellitus ([facility]     Femur fracture, right (H[facility])     distal femur    GERD (gastroesophageal reflux disease)     Hypertension     Venous stasis        Past Surgical History:      Procedure Laterality Date    APPENDECTOMY      KNEE SURGERY      PACEMAKER INSERTION      AV sequential pacer    PACEMAKER INSERTION N/A 12/27/2020    PACEMAKER INSERTION PERMANENT performed by Gavin Raphael MD at [place] [address]  12/27/2020    AV sequential       Medications (prior to admission):  Prior to Admission medications    Medication Sig Start Date End Date Taking?  Authorizing Provider   potassium chloride (KLOR-CON M) 20 MEQ extended release tablet Take 1 tablet by mouth 2 times daily 4/23/22   Tamra Montana, APRN - CNP   furosemide (LASIX) 20 MG tablet Take 1 tablet by mouth 2 times daily 4/23/22   Tamra Montana, APRN - CNP   diphenhydrAMINE (BENADRYL) 25 MG tablet Take 25 mg by mouth every 6 hours as needed for Itching  Patient not taking: Reported on 4/28/2022    Historical Provider, MD   vitamin E 180 MG (400 UNIT) CAPS capsule Take 180 mg by mouth daily    Historical Provider, MD   donepezil (ARICEPT) 5 MG tablet Take 5 mg by mouth nightly    Historical Provider, MD   ibuprofen (ADVIL;MOTRIN) 600 MG tablet Take 600 mg by mouth every 8 hours as needed for Pain    Historical Provider, MD   amLODIPine (NORVASC) 5 MG tablet Take 5 mg by mouth daily    Historical Provider, MD   sertraline (ZOLOFT) 25 MG tablet Take 1 tablet by mouth nightly  2/6/21   Historical Provider, MD   ferrous sulfate (IRON 325) 325 (65 Fe) MG tablet Take 1 tablet by mouth every other day 12/29/20   Noemi Sauer MD   sennosides-docusate sodium (SENOKOT-S) 8.6-50 MG tablet Take 2 tablets by mouth daily as needed for Constipation  Patient taking differently: Take 2 tablets by mouth as needed  12/29/20   Noemi Sauer MD   aspirin 81 MG chewable tablet Take 81 mg by mouth daily    Historical Provider, MD   atorvastatin (LIPITOR) 10 MG tablet Take 10 mg by mouth daily    Historical Provider, MD   budesonide-formoterol (SYMBICORT) 160-4.5 MCG/ACT AERO Inhale 2 puffs into the lungs 2 times daily    Historical Provider, MD   losartan (COZAAR) 100 MG tablet Take 100 mg by mouth daily    Historical Provider, MD   pantoprazole (PROTONIX) 20 MG tablet Take 20 mg by mouth daily    Historical Provider, MD   albuterol sulfate HFA (VENTOLIN HFA) 108 (90 Base) MCG/ACT inhaler Inhale 2 puffs into the lungs every 6 hours as needed for Wheezing    Historical Provider, MD   vitamin D (CHOLECALCIFEROL) 25 MCG (1000 UT) TABS tablet Take 1,000 Units by mouth daily    Historical Provider, MD       Allergy(ies):  Lisinopril, Pcn [penicillins], and Sulfa antibiotics    Social History:  TOBACCO:  reports that she has never smoked. She has never used smokeless tobacco.  ETOH:  reports previous alcohol use. Family History:      Family history unknown: Yes       Review of Systems:  Pertinent positives are listed in HPI. At least 10-point ROS reviewed and were negative. Vitals and physical examination:  BP (!) 154/78   Pulse 61   Temp 97.9 °F (36.6 °C) (Oral)   Resp 18   SpO2 97%   Gen/overall appearance: Not in acute distress. Alert.  Oriented X2  Head: Normocephalic, atraumatic  Eyes: EOMI, good acuity  ENT: Oral mucosa moist  Neck: No JVD, demonstrate no acute abnormality. Previous cataract surgery. SINUSES: Partial opacification in the left frontal sinus and frontal ethmoidal junction. The remaining paranasal sinuses and mastoid air cells are clear. SOFT TISSUES/SKULL:  No acute abnormality of the visualized skull or soft tissues. No acute intracranial hemorrhage, mass effect, midline shift, or acute hydrocephalus. Does have chronic ischemic changes in the white matter and generalized volume loss. Similar features are noted on the old exam.     CT CERVICAL SPINE WO CONTRAST    Result Date: 4/30/2022  EXAMINATION: CT OF THE CERVICAL SPINE WITHOUT CONTRAST 4/30/2022 3:03 am TECHNIQUE: CT of the cervical spine was performed without the administration of intravenous contrast. Multiplanar reformatted images are provided for review. Dose modulation, iterative reconstruction, and/or weight based adjustment of the mA/kV was utilized to reduce the radiation dose to as low as reasonably achievable. COMPARISON: 03/30/2021 HISTORY: ORDERING SYSTEM PROVIDED HISTORY: fall TECHNOLOGIST PROVIDED HISTORY: Reason for exam:->fall Decision Support Exception - unselect if not a suspected or confirmed emergency medical condition->Emergency Medical Condition (MA) Reason for Exam: fall FINDINGS: BONES/ALIGNMENT: There is reversal of the expected cervical lordosis similar to the previous exam.  The mild anterior translation of C3 on C4 and C4 and C5 are similar to the previous study. The bones are diffusely osteopenic and have diffuse degenerative changes. No definite acute fractures are identified. There is no collapse of the vertebral bodies or dislocation of the facet joints. The craniocervical junction has severe degenerative changes but is stable. DEGENERATIVE CHANGES: Severe diffuse degenerative changes throughout the cervical spine with disc space narrowing, chronic endplate changes, and facet arthropathy.   Severe degenerative changes at the atlantal dental joint, atlantal axial joints, and left atlantal occipital joint. Does cause variable degrees of spinal canal stenosis which is most severe at the C5-C6 level. Multilevel neural foraminal stenosis as well. SOFT TISSUES: Tortuosity of the carotid arteries approaching the midline exaggerate the appearance of the prevertebral soft tissues on the sagittal construction. There is no prevertebral hematoma demonstrated. Severe diffuse degenerative changes of the spine are redemonstrated. The overall configuration appears stable. No new acute fractures or dislocation. XR CHEST PORTABLE    Result Date: 4/30/2022  EXAMINATION: ONE XRAY VIEW OF THE CHEST 4/30/2022 3:03 am COMPARISON: 04/22/2022 HISTORY: ORDERING SYSTEM PROVIDED HISTORY: fall TECHNOLOGIST PROVIDED HISTORY: Reason for exam:->fall Reason for Exam: fall FINDINGS: Left-sided pacemaker with 2 leads to the heart. No endotracheal tube or central venous lines. A surgical anchor is present at the right humerus. The bones appear osteopenic with underpenetration of the spine. No definite fractures are seen at the upper ribs. There is no soft tissue emphysema. Cardiac silhouette remains enlarged with mild pulmonary vascular congestion. No new consolidation, pleural effusion, or pneumothorax. Cardiomegaly with mild pulmonary vascular congestion is similar to the recent study. No new consolidation, pleural effusion, or pneumothorax. No obvious rib fractures. If point tenderness, then consider rib series. XR CHEST PORTABLE    Result Date: 4/22/2022  EXAMINATION: ONE XRAY VIEW OF THE CHEST 4/22/2022 12:21 am COMPARISON: Chest radiograph dated March 15, 2021 HISTORY: ORDERING SYSTEM PROVIDED HISTORY: chest pain TECHNOLOGIST PROVIDED HISTORY: Reason for exam:->chest pain Reason for Exam: sob FINDINGS: Cardiomegaly is noted. Pulmonary vascular congestion is seen. A left-sided intracardiac device is noted.   There is no definite focal consolidation or large pleural effusion. Cardiomegaly with pulmonary vascular congestion. XR HIP 2-3 VW W PELVIS LEFT    Result Date: 4/30/2022  EXAMINATION: ONE XRAY VIEW OF THE PELVIS AND TWO XRAY VIEWS LEFT HIP 4/30/2022 3:03 am COMPARISON: None. HISTORY: ORDERING SYSTEM PROVIDED HISTORY: fall TECHNOLOGIST PROVIDED HISTORY: Reason for exam:->fall Reason for Exam: fall FINDINGS: The bones are osteopenic. A slight asymmetry to the sacroiliac joints could be from patient positioning with angulation to the right. No acute fractures are seen of the pelvic ring. Degenerative changes are noted along the sacroiliac joints and pubic symphysis. Narrowing and sclerosis along the right hip joint. Acute intertrochanteric fracture at the left femur with comminuted fragments of the greater and lesser trochanter. There is impaction and overlap the site with proximal migration of the shaft. Additional osteoarthritic joint space narrowing and articular sclerosis along the acetabulum. Acute intertrochanteric fracture of the left femur with impaction and overlap. Mild asymmetry of the sacroiliac joints may be from patient positioning. No definite fractures are seen of the pelvic ring. Discussed with ER physician.       Thank you VINNY Ambrosio for the opportunity to be involved in this patient's care.    -----------------------------  Jennifer Davila MD  [facility]

## 2022-05-01 NOTE — PROGRESS NOTES
Update. Please see H&P from this am for further details. Patient seen postop, tolerating well other than some pain at surgical site.  dvt ppx ordered for tomorrow by surgery team    Inna Estrada MD 11:51 AM 05/01/22

## 2022-05-01 NOTE — ANESTHESIA PROCEDURE NOTES
Spinal Block    Patient location during procedure: OR  Start time: 5/1/2022 7:51 AM  End time: 5/1/2022 7:59 AM  Reason for block: primary anesthetic and at surgeon's request  Staffing  Performed: resident/CRNA   Resident/CRNA: CESAR Sellers CRNA  Preanesthetic Checklist  Completed: patient identified, IV checked, site marked, risks and benefits discussed, surgical consent, monitors and equipment checked, pre-op evaluation, timeout performed, anesthesia consent given, oxygen available and patient being monitored  Spinal Block  Patient position: left lateral decubitus  Prep: ChloraPrep  Patient monitoring: cardiac monitor, continuous pulse ox, continuous capnometry, frequent blood pressure checks and oxygen  Approach: left paramedian  Location: L3/L4  Provider prep: mask and sterile gloves  Local infiltration: lidocaine  Needle  Needle type: Quincke   Needle gauge: 22 G  Assessment  Sensory level: T4  Swirl obtained: Yes  CSF: clear  Attempts: 2  Hemodynamics: stable  Additional Notes  This spinal dose was given at 0759.  Not repetitive does

## 2022-05-01 NOTE — PROGRESS NOTES
Occupational Therapy    [facility] CARE OCCUPATIONAL THERAPY EVALUATION    [facility] , 1935, 1120/1120-A, 5/1/2022    Discharge Recommendation: Victor M Rosas      History:  Jamul:  There were no encounter diagnoses. Past Medical History:   Diagnosis Date    Arthrofibrosis of total knee arthroplasty ([address]     bilateral    Asthma     CAD (coronary artery disease)     CH[facility]congestive heart failure) ([facility])     Constipation     DDD (degenerative disc disease), cervical     Degenerative disc disease, cervical     Diabetes mellitus ([address]     Femur fracture, right ([facility])     distal femur    GERD (gastroesophageal reflux disease)     Hypertension     Venous stasis        Subjective:  Patient states: Repetitively \"I'm so sorry\"  Pain: Reported severe pain in L hip sx site. Cues and education throughout eval for pain mgmt  Communication with other providers: co-eval w/ PT, handoff to RN  Restrictions: General Precautions, Fall Risk, L LE WBAT    Home Setup/Prior level of function:  Social/Functional History  Additional Comments: disoriented, confused, answers changing during assessment ; states was ind w/ mobility w/o device prev, assist w/ ADL's home mgmt; from [address] [place] [address], will need further info clarified 2/2 impaired cog     Examination:  · Observation: Supine in bed upon arrival, agreeable to therapy eval. Lethargic, confused, difficulty following commands.  Reoriented pt to place and situation multiple times throughout eval.   · Vision: [facility]  · Hearing: [facility]  · Vitals: Stable vitals throughout session on 4L O2      Body Systems and functions:  · ROM: WFL   · Strength: B UE grossly 4-/5 across all major joints   · Sensation: WFL  · Tone: Normal  · Coordination: WFL  · Perception: WNL      Cognitive and Psychosocial Functioning:  · Overall cognitive status: alert, oriented to self only  · Affect: Normal   Overall Cognitive Status Exceptions   Arousal/Alertness Delayed responses to stimuliArousal/Alertness. Delayed responses to stimuli. The comment is lethargic s/p pain medication administration. Taken on 5/1/22 1512   Following Commands Follows one step commands with increased time; Follows multistep commands with repitition   Attention Span Difficulty attending to directions   Memory Decreased recall of recent events; Decreased short term memory; Decreased recall of precautions   Safety Judgement Good awareness of safety precautions   Problem Solving Assistance required to identify errors made; Assistance required to implement solutions   Insights Decreased awareness of deficits   Initiation Requires cues for some   Sequencing Requires cues for some   Cognition Comment confused, disoriented, delayed cognition, intermittently difficult to redirect         Functional Mobility:  · Bed mobility:  Max Ax2 supine to sitting EOB, max A to scoot hips forward. Max Ax2 sitting to supine and repositioning in bed. Cues and assist for pain mgmt throughout, pt becomes rigid and does not follow commands w/ inc pain levels requiring reorientation and redirection  · Sitting balance:  Min A - CGA to maintain static and dynamic sitting EOB     · Transfers: STS from EOB mod Ax2 w/ max VCs for sequencing/safety and hand placement, stand to sit mod Ax2 w/ max cues for sequencing  · Standing balance: Mod Ax2 to maintain static standing w/ [place] assist to maintain appropriate RW positioning  · Functional Mobility: NT d/t safety and pain  · Toilet/Shower Transfers: NT d/t safety        Activities of Daily Living (ADLs):  · Feeding: set up A while seated  · Grooming: mod A  · UB bathing: mod A  · LB bathing: max A  · UB dressing: mod A  · LB dressing: max A  · Toileting: max A hygiene and clothing mgmt      AM-PAC 6 click short form for inpatient daily activity:   How much help from another person does the patient currently need. ..  Unable  Dep A Lot  Max A A Lot   Mod A A Little  Min A A Little   CGA  SBA None   Mod I  Indep  Sup   1. Putting on and taking off regular lower body clothing? [] 1    [x] 2   [] 2   [] 3   [] 3   [] 4      2. Bathing (including washing, rinsing, drying)? [] 1   [x] 2   [] 2 [] 3 [] 3 [] 4   3. Toileting, which includes using toilet, bedpan, or urinal? [] 1    [x] 2   [] 2   [] 3   [] 3   [] 4     4. Putting on and taking off regular upper body clothing? [] 1   [] 2   [x] 2   [] 3   [] 3    [] 4      5. Taking care of personal grooming such as brushing teeth? [] 1   [] 2    [x] 2 [] 3    [] 3   [] 4      6. Eating meals? [] 1   [] 2   [] 2   [] 3   [] 3   [x] 4        Raw Score:  14     [24=0% impaired(CH), 23=1-19%(CI), 20-22=20-39%(CJ), 15-19=40-59%(CK), 10-14=60-79%(CL), 7-9=80-99%(CM), 6=100%(CN)]     Treatment:    Therapeutic Activity Training:   Therapeutic activity training was instructed today. Cues were given for safety, sequence, UE/LE placement, awareness, and balance. Activities performed today included bed mobility training, sup-sit, sit-stand. Educated pt on role of OT, therapy POC and functional goals, progression w/ ADLs and transfers, importance of movement for pressure relief, pain mgmt techniques, d/c recommendations     Safety Measures: Gait belt used, Left in bed, Alarm in place      Assessment:  Pt is an 81 y o F admitted d/t mechanical fall w/ L hip fx. ORIF femur nail insertion 5/1, POD 0. Pt baseline is unclear, reports mostly IND for ADLs, has assist for high level IADLs, and mod I for functional transfers/mobility w/ rollator. Pt currently presents w/ deficits in ADL and high level IADL independence, functional ADL transfers, strength, and functional activity tolerance. Continued OT services recommended to increase safety and independence with ADL routine and to address remaining functional deficits. Pt would benefit from continued acute care OT services w/ discharge to SNF. Complexity: Moderate  Prognosis: Good, limited this date d/t severe pain.

## 2022-05-01 NOTE — PROGRESS NOTES
1865: Arrived to PACU from OR. Monitors applied, alarms on. Report obtained from Shamika Green and Duc TRIANA.  2738: Turned and repositioned in bed, warm blankets on. Heels off bed, bruising noted to each buttock. No redness or swelling noted at spinal insertion site. Unable to determine sensory level from patient but patient is able to wiggle toes slightly on right foot. 1000: Dozes, but arouses easily, coughs and deep breathes with encouragement. Wiggles toes both feet and slightly able to bend right knee. 1028: Transported to room.

## 2022-05-01 NOTE — CONSULTS
Orthopaedic Consult    Patient Name: Levon Husbands   ([birth year])  MRN   [id number]   Today's date:  5/1/2022    CHIEF COMPLAINT:   Left hip pain    HISTORY OF PRESENT ILLNESS:      The patient is a 81 y.o. female  who presents to the ER after a fall. X-rays were done in the ER showing a Left intertrochanteric hip fracture. After the fall the patient was unable to ambulate and the pain was sudden, sharp and rated 10/10    The patient denied any chest pain, shortness of breath or syncopal episode prior to the fall. Prior to the fall the patient had no hip pain and ambulated well using walker occasionally.     81-year-old female with history of CAD, chronic diastolic heart failure (most recent echocardiogram with grade 3 diastolic dysfunction), dementia, well-controlled diabetes type 2 (most recent hemoglobin A1c of 6.0), GERD, essential hypertension, normocytic anemia, who presents to the emergency room (at [facility] in [place]) with complaints of fall, left hip pain.  Patient ambulated to the bathroom without her walker.  While she was being walked (with the assistance of a staff at the facility) back into her room, she tripped and sustained a fall.      Past Medical History         Diagnosis Date    Arthrofibrosis of total knee arthroplasty ([address]     bilateral    Asthma     CAD (coronary artery disease)     CHF (congestive heart failure) ([address]     Constipation     DDD (degenerative disc disease), cervical     Degenerative disc disease, cervical     Diabetes mellitus ([address]     Femur fracture, right ([address]     distal femur    GERD (gastroesophageal reflux disease)     Hypertension     Venous stasis        Past Surgical History         Procedure Laterality Date    APPENDECTOMY      KNEE SURGERY      PACEMAKER INSERTION      AV sequential pacer    PACEMAKER INSERTION N/A 12/27/2020    PACEMAKER INSERTION PERMANENT performed by Charity Long MD at [facility] 12/27/2020    AV sequential       Medications Prior to Admission:     Prior to Admission medications    Medication Sig Start Date End Date Taking?  Authorizing Provider   potassium chloride (KLOR-CON M) 20 MEQ extended release tablet Take 1 tablet by mouth 2 times daily  Patient not taking: Reported on 5/1/2022 4/23/22   CESAR Fagan CNP   furosemide (LASIX) 20 MG tablet Take 1 tablet by mouth 2 times daily 4/23/22   CESAR Fagan CNP   diphenhydrAMINE (BENADRYL) 25 MG tablet Take 25 mg by mouth every 6 hours as needed for Itching  Patient not taking: Reported on 4/28/2022    Historical Provider, MD   vitamin E 180 MG (400 UNIT) CAPS capsule Take 180 mg by mouth daily    Historical Provider, MD   donepezil (ARICEPT) 5 MG tablet Take 5 mg by mouth nightly    Historical Provider, MD   ibuprofen (ADVIL;MOTRIN) 600 MG tablet Take 600 mg by mouth every 8 hours as needed for Pain    Historical Provider, MD   amLODIPine (NORVASC) 5 MG tablet Take 5 mg by mouth daily    Historical Provider, MD   sertraline (ZOLOFT) 25 MG tablet Take 1 tablet by mouth nightly  2/6/21   Historical Provider, MD   ferrous sulfate (IRON 325) 325 (65 Fe) MG tablet Take 1 tablet by mouth every other day 12/29/20   Tad Denny MD   sennosides-docusate sodium (SENOKOT-S) 8.6-50 MG tablet Take 2 tablets by mouth daily as needed for Constipation  Patient taking differently: Take 2 tablets by mouth as needed  12/29/20   Tad Denny MD   aspirin 81 MG chewable tablet Take 81 mg by mouth daily    Historical Provider, MD   atorvastatin (LIPITOR) 10 MG tablet Take 10 mg by mouth daily    Historical Provider, MD   budesonide-formoterol (SYMBICORT) 160-4.5 MCG/ACT AERO Inhale 2 puffs into the lungs 2 times daily    Historical Provider, MD   losartan (COZAAR) 100 MG tablet Take 100 mg by mouth daily    Historical Provider, MD   pantoprazole (PROTONIX) 20 MG tablet Take 20 mg by mouth daily    Historical Provider, MD   albuterol sulfate HFA (VENTOLIN HFA) 108 (90 Base) MCG/ACT inhaler Inhale 2 puffs into the lungs every 6 hours as needed for Wheezing    Historical Provider, MD   vitamin D (CHOLECALCIFEROL) 25 MCG (1000 UT) TABS tablet Take 1,000 Units by mouth daily    Historical Provider, MD       Allergies     Lisinopril, Pcn [penicillins], and Sulfa antibiotics    Social History   TOBACCO:   reports that she has never smoked. She has never used smokeless tobacco.  ETOH:   reports previous alcohol use. Patient currently lives in a nursing home    Family History         Family history unknown: Yes       Review of Systems   As in the admission H&P, reviewed and unchanged  Additional systems as reviewed in HPI    Physical Exam:    Vitals: /85   Pulse 61   Temp 98.2 °F (36.8 °C) (Oral)   Resp 18   Wt 190 lb 7.6 oz (86.4 kg)   SpO2 95%   BMI 30.74 kg/m² ,  Body mass index is 30.74 kg/m². General appearance: alert, appears stated age and cooperative  Skin: Skin color, texture, turgor normal. No rashes or lesions  HEENT: Head: Normocephalic, no lesions, without obvious abnormality. Neck: supple, symmetrical, trachea midline    Extremities:   Left Lower    hip tender to palpation.   Leg shortened and externally rotated   -Pain with PROM to hip, full assessment not done due to known fracture   -No knee or ankle deformity or significant tenderness   -Good distal pulses with good capillary refill   -Able to dorsiflex and plantar flex ankle and toes   -sensation intact to light touch throughout    Contralateral LE:   -No tenderness over hip, knee, ankle   -No pain with gentle PROM hip/knee/ankle   -5/5 strength ankle DF/PF   -skin and sensation intact throughout      Bilateral UE's:   -No tenderness over bilateral wrist, elbow or shoulders.   -Full painless AROM and strength throughout   -skin and sensation intact throughout      Neurologic: Mental status: Alert, oriented, thought content appropriate    Labs     CBC:   Recent Labs 04/30/22  0315   WBC 9.4   HGB 10.4*        BMP:    Recent Labs     04/30/22  0315      K 4.1      CO2 27   BUN 18   CREATININE 0.8   GLUCOSE 111*     INR:    Lab Results   Component Value Date    INR 1.02 12/26/2020    INR 1.09 12/25/2020    PROTIME 12.3 12/26/2020    PROTIME 12.5 12/25/2020        X-ray view   Imaging Review    AP and lateral views of the hip show an unstable intertrochanteric hip fracture with displacement. Assessment and Plan     1. Left  Intertrochanteric Hip Fracture    The patient has been admitted to the hospitalist service for pre-operative and post-operative medical management. Based on the fracture pattern, I recommend operative fixation of the fracture with a trochanteric nail. The goal of surgical intervention is pain control and mobility. Postoperatively the patient will remain in the hospital for pain control, physical therapy, and medical optimization. Risks and benefits of surgery were discussed with the patient and family. Risks include infection, poor bone healing, DVT, medical complications, and hip pain and dysfunction. Postoperatively the patient will be weightbearing as tolerated and physical therapy will begin immediately. Post-operatively, we will assess the patients discharge needs with the help of case management. The patient will likely require a stay at a skilled nursing unit or ARU. The patient understands the risks and benefits of surgery and wishes to proceed with operative intervention.      Richard Smith MD

## 2022-05-01 NOTE — ANESTHESIA PRE PROCEDURE
Department of Anesthesiology  Preprocedure Note       Name:  Nyoka Bence   Age:  81 y.o.  :  1935                                          MRN:  [id number]         Date:  2022      Surgeon: Araceli Rankin):  Reyes Torres MD    Procedure: Procedure(s): FEMUR IM NAIL SAWYER INSERTION    Medications prior to admission:   Prior to Admission medications    Medication Sig Start Date End Date Taking?  Authorizing Provider   potassium chloride (KLOR-CON M) 20 MEQ extended release tablet Take 1 tablet by mouth 2 times daily  Patient not taking: Reported on 2022   CESAR Jackson CNP   furosemide (LASIX) 20 MG tablet Take 1 tablet by mouth 2 times daily 22   CESAR Jackson CNP   diphenhydrAMINE (BENADRYL) 25 MG tablet Take 25 mg by mouth every 6 hours as needed for Itching  Patient not taking: Reported on 2022    Historical Provider, MD   vitamin E 180 MG (400 UNIT) CAPS capsule Take 180 mg by mouth daily    Historical Provider, MD   donepezil (ARICEPT) 5 MG tablet Take 5 mg by mouth nightly    Historical Provider, MD   ibuprofen (ADVIL;MOTRIN) 600 MG tablet Take 600 mg by mouth every 8 hours as needed for Pain    Historical Provider, MD   amLODIPine (NORVASC) 5 MG tablet Take 5 mg by mouth daily    Historical Provider, MD   sertraline (ZOLOFT) 25 MG tablet Take 1 tablet by mouth nightly  21   Historical Provider, MD   ferrous sulfate (IRON 325) 325 (65 Fe) MG tablet Take 1 tablet by mouth every other day 20   Steffany Turcios MD   sennosides-docusate sodium (SENOKOT-S) 8.6-50 MG tablet Take 2 tablets by mouth daily as needed for Constipation  Patient taking differently: Take 2 tablets by mouth as needed  20   Steffany Turcios MD   aspirin 81 MG chewable tablet Take 81 mg by mouth daily    Historical Provider, MD   atorvastatin (LIPITOR) 10 MG tablet Take 10 mg by mouth daily    Historical Provider, MD   budesonide-formoterol (SYMBICORT) 160-4.5 MCG/ACT AERO Inhale 2 puffs into the lungs 2 times daily    Historical Provider, MD   losartan (COZAAR) 100 MG tablet Take 100 mg by mouth daily    Historical Provider, MD   pantoprazole (PROTONIX) 20 MG tablet Take 20 mg by mouth daily    Historical Provider, MD   albuterol sulfate HFA (VENTOLIN HFA) 108 (90 Base) MCG/ACT inhaler Inhale 2 puffs into the lungs every 6 hours as needed for Wheezing    Historical Provider, MD   vitamin D (CHOLECALCIFEROL) 25 MCG (1000 UT) TABS tablet Take 1,000 Units by mouth daily    Historical Provider, MD       Current medications:    Current Facility-Administered Medications   Medication Dose Route Frequency Provider Last Rate Last Admin    morphine (PF) injection 2 mg  2 mg IntraVENous Q3H PRN Jordon Ardon MD        oxyCODONE (ROXICODONE) immediate release tablet 5 mg  5 mg Oral Q4H PRN Jordon Ardon MD        hydrALAZINE (APRESOLINE) 10 mg in sodium chloride 0.9 % 50 mL ivpb  10 mg IntraVENous Q6H PRN Jordon Ardon MD        albuterol sulfate  (90 Base) MCG/ACT inhaler 2 puff  2 puff Inhalation Q6H PRN Jordon Ardon MD        [Held by provider] aspirin chewable tablet 81 mg  81 mg Oral Daily Jordon Ardon MD        atorvastatin (LIPITOR) tablet 10 mg  10 mg Oral Daily Jordon Ardon MD        budesonide-formoterol (SYMBICORT) 160-4.5 MCG/ACT inhaler 2 puff  2 puff Inhalation BID Jordon Ardon MD   2 puff at 04/30/22 2209    donepezil (ARICEPT) tablet 5 mg  5 mg Oral Nightly Jordon Ardon MD   5 mg at 04/30/22 2142    ferrous sulfate (IRON 325) tablet 325 mg  325 mg Oral Every Other Day Jordon Ardon MD        losartan (COZAAR) tablet 100 mg  100 mg Oral Daily Jordon Ardon MD        pantoprazole (PROTONIX) tablet 20 mg  20 mg Oral Daily Jordon Ardon MD        sertraline (ZOLOFT) tablet 25 mg  25 mg Oral Nightly Jordon Ardon MD   25 mg at 04/30/22 2142    vitamin D (CHOLECALCIFEROL) tablet 1,000 Units  1,000 Units Oral Daily Jed Underwood MD        vitamin E capsule 400 Units  400 Units Oral Daily Jed Underwood MD        sodium chloride flush 0.9 % injection 5-40 mL  5-40 mL IntraVENous 2 times per day Jed Underwood MD   10 mL at 04/30/22 2142    sodium chloride flush 0.9 % injection 10 mL  10 mL IntraVENous PRN Jerome French MD        0.9 % sodium chloride infusion   IntraVENous PRN Jed Underwood MD        [Held by provider] enoxaparin (LOVENOX) injection 40 mg  40 mg SubCUTAneous Daily Jed Underwood MD        ondansetron (ZOFRAN-ODT) disintegrating tablet 4 mg  4 mg Oral Q8H PRN Jed Underwood MD        Or    ondansetron (ZOFRAN) injection 4 mg  4 mg IntraVENous Q6H PRN Jed Underwood MD        polyethylene glycol (GLYCOLAX) packet 17 g  17 g Oral Daily PRN Jed Underwood MD        acetaminophen (TYLENOL) tablet 650 mg  650 mg Oral Q6H PRN Jed Underwood MD   650 mg at 04/30/22 2142    Or    acetaminophen (TYLENOL) suppository 650 mg  650 mg Rectal Q6H PRN Jed Underwood MD        clindamycin (CLEOCIN) 900 mg in dextrose 5 % 50 mL IVPB  900 mg IntraVENous On Call to [address] [place] [address], MD        amLODIPine (NORVASC) tablet 10 mg  10 mg Oral Daily Eliseo Cotton APRN - CNP        insulin lispro (HUMALOG) injection vial 0-12 Units  0-12 Units SubCUTAneous TID WC Eliseo Cotton, APRN - CNP        insulin lispro (HUMALOG) injection vial 0-6 Units  0-6 Units SubCUTAneous Nightly Eliseo Cotton, APRN - CNP   4 Units at 04/30/22 2158    dextrose 50 % IV solution  12.5 g IntraVENous PRN Eliseo Cotton, APRN - CNP        glucagon (rDNA) injection 1 mg  1 mg IntraMUSCular PRN Eliseo Cotton, APRN - CNP        dextrose 5 % solution  100 mL/hr IntraVENous PRN Eliseo Cotton, APRN - CNP        glucose chewable tablet 16 g  16 g Oral PRN Jed Underwood MD           Allergies:     Allergies   Allergen Reactions    Lisinopril     Pcn [Penicillins]     Sulfa Antibiotics        Problem List:    Patient Active Problem List   Diagnosis Code    Complete heart block (HCC) I44.2    Cardiac pacemaker in situ Z95.0    Edema of lower extremity R60.0    Essential hypertension I10    Dyslipidemia E78.5    Other heart failure (HCC) I50.89    Pulmonary HTN (HCC) I27.20    Chronic diastolic heart failure (HCC) I50.32    Closed left hip fracture, initial encounter ([facility] S72.002A       Past Medical History:        Diagnosis Date    Arthrofibrosis of total knee arthroplasty (HCC)     bilateral    Asthma     CAD (coronary artery disease)     CHF (congestive heart failure) (HCC)     Constipation     DDD (degenerative disc disease), cervical     Degenerative disc disease, cervical     Diabetes mellitus (HCC)     Femur fracture, right (HCC)     distal femur    GERD (gastroesophageal reflux disease)     Hypertension     Venous stasis        Past Surgical History:        Procedure Laterality Date    APPENDECTOMY      KNEE SURGERY      PACEMAKER INSERTION      AV sequential pacer    PACEMAKER INSERTION N/A 12/27/2020    PACEMAKER INSERTION PERMANENT performed by Claudia Lan [facility] at [facility]  12/27/2020    AV sequential       Social History:    Social History     Tobacco Use    Smoking status: Never Smoker    Smokeless tobacco: Never Used   Substance Use Topics    Alcohol use: Not Currently                                Counseling given: Not Answered      Vital Signs (Current):   Vitals:    04/30/22 1345 04/30/22 2140 04/30/22 2354 05/01/22 0430   BP: (!) 174/82 (!) 172/98 (!) 154/78 128/85   Pulse: 66 61     Resp: 17 18     Temp:  97.9 °F (36.6 °C)  98.2 °F (36.8 °C)   TempSrc:  Oral  Oral   SpO2: 91% 97%  95%   Weight:    190 lb 7.6 oz (86.4 kg)                                              BP Readings from Last 3 Encounters:   05/01/22 128/85   04/30/22 (!) 156/77   04/28/22 128/68       NPO Status: Time of last liquid consumption: 2200                        Time of last solid consumption: 2000 Date of last liquid consumption: 04/30/22                        Date of last solid food consumption: 04/30/22    BMI:   Wt Readings from Last 3 Encounters:   05/01/22 190 lb 7.6 oz (86.4 kg)   04/28/22 195 lb (88.5 kg)   04/23/22 184 lb 4.8 oz (83.6 kg)     Body mass index is 30.74 kg/m².     CBC:   Lab Results   Component Value Date    WBC 9.4 04/30/2022    RBC 3.46 04/30/2022    HGB 10.4 04/30/2022    HCT 33.5 04/30/2022    MCV 96.8 04/30/2022    RDW 14.0 04/30/2022     04/30/2022       CMP:   Lab Results   Component Value Date     04/30/2022    K 4.1 04/30/2022     04/30/2022    CO2 27 04/30/2022    BUN 18 04/30/2022    CREATININE 0.8 04/30/2022    GFRAA >60 04/30/2022    LABGLOM >60 04/30/2022    GLUCOSE 111 04/30/2022    PROT 6.0 04/30/2022    CALCIUM 10.5 04/30/2022    BILITOT 0.5 04/30/2022    ALKPHOS 102 04/30/2022    AST 18 04/30/2022    ALT 12 04/30/2022       POC Tests:   Recent Labs     04/30/22 2041   POCGLU 309*       Coags:   Lab Results   Component Value Date    PROTIME 12.3 12/26/2020    INR 1.02 12/26/2020    APTT 40.2 12/26/2020       HCG (If Applicable): No results found for: PREGTESTUR, PREGSERUM, HCG, HCGQUANT     ABGs: No results found for: PHART, PO2ART, DVD7RRK, WCQ7VBY, BEART, U5NABEDT     Type & Screen (If Applicable):  No results found for: LABABO, LABRH    Drug/Infectious Status (If Applicable):  No results found for: HIV, HEPCAB    COVID-19 Screening (If Applicable):   Lab Results   Component Value Date    COVID19 NOT DETECTED 12/28/2020           Anesthesia Evaluation  Patient summary reviewed  Airway: Mallampati: Unable to assess / NA        Dental:          Pulmonary:   (+) asthma:                            Cardiovascular:    (+) hypertension:, pacemaker:, CAD:, dysrhythmias: paced rhythm, CHF:,         Rhythm: regular                      Neuro/Psych:               GI/Hepatic/Renal:   (+) GERD:,           Endo/Other:    (+) Diabetes, : arthritis:., .                 Abdominal:             Vascular: Other Findings:             Anesthesia Plan      general and spinal     ASA 4 - emergent       Induction: intravenous.                           Yuliya Santizo MD   5/1/2022

## 2022-05-01 NOTE — ANESTHESIA POSTPROCEDURE EVALUATION
Department of Anesthesiology  Postprocedure Note    Patient: True Carmen  MRN: [id number]  YOB: 1935  Date of evaluation: 5/1/2022  Time:  2:10 PM     Procedure Summary     Date: 05/01/22 Room / Location: [address]    Anesthesia Start: 2113 Anesthesia Stop: 3109    Procedure: FEMUR IM NAIL SAWYER INSERTION (Left Hip) Diagnosis: (hip fx)    Surgeons: Lisbeth Dubin, MD Responsible Provider: Tere Lomeli MD    Anesthesia Type: spinal ASA Status: 4 - Emergent          Anesthesia Type: spinal    Chely Phase I: Chely Score: 8    Chely Phase II:      Last vitals: Reviewed and per EMR flowsheets.        Anesthesia Post Evaluation    Patient location during evaluation: PACU  Patient participation: complete - patient participated  Level of consciousness: awake  Pain score: 0  Airway patency: patent  Nausea & Vomiting: no nausea and no vomiting  Complications: no  Cardiovascular status: hemodynamically stable  Respiratory status: acceptable  Hydration status: euvolemic

## 2022-05-01 NOTE — OP NOTE
Operative Note      Patient: Olga Arroyo  YOB: 1935  MRN: [id number]    Date of Procedure: 5/1/2022    Pre-Op Diagnosis: Left hip displaced intertrochanteric fracture    Post-Op Diagnosis: Same       Procedure(s): FEMUR IM NAIL SAWYER INSERTION    Surgeon(s):  Narciso Armenta MD    Assistant:   Physician Assistant: Alonzo Phipps PA-C    Anesthesia: Spinal    Estimated Blood Loss (mL): 487     Complications: None    Specimens:   * No specimens in log *    Implants:  Implant Name Type Inv. Item Serial No.  Lot No. LRB No. Used Action   NAIL IM L400MM [id number] 125DEG LNG L PROX FEM GRN TI [email or web address] - [id number]  NAIL IM L400MM [id number] 125DEG LNG L PROX FEM GRN TI [employer]  [employer] [place]-WD [id number] Left 1 Implanted   BLADE IM L95MM DIA10.35MM PROX FEM G TI [employer] TARUN FOR - [id number]  BLADE IM L95MM DIA10.35MM PROX FEM G TI [email or web address] FEN TARUN FOR  DEPUY SYN[employer]-WD [id number] Left 1 Implanted   SCREW BNE L42MM DIA5MM TIB LT GRN TI [employer] JULIET FULL THRD - [id number]  SCREW BNE L42MM DIA5MM TIB LT GRN TI [employer] JULIET FULL THRD  DEPU[employer] [place]- [id number] Left 1 Implanted         Drains:   Urinary Catheter Tello (Active)   Catheter Indications Urinary retention (acute or chronic), continuous bladder irrigation or bladder outlet obstruction 04/30/22 1951   Urine Color Tracy 04/30/22 2315   Urine Appearance Clear 04/30/22 2315   Urine Odor Other (Comment) 04/30/22 2315   Collection Container Standard 04/30/22 1951   Status Draining 04/30/22 1951   Output (mL) 950 mL 04/30/22 2315       Findings:     Detailed Description of Procedure: The patient was identified in the preoperative area and site was marked. She was taken back to the operating room and a spinal anesthetic placed. She was then placed on the traction table with her well leg padded and secured in a declined position. Left lower extremity was placed in the traction table and reduction was performed and confirmed with the C arm images. The left lower extremity was then prepped and draped in sterile fashion. Timeout was performed and preoperative antibiotics were given. Incision was made at the proximal aspect of the hip and dissection was carried down to the gluteal fascia and the abductor musculature was split gaining access to the greater trochanter. A guidepin was placed tip of the greater trochanter on the AP view and at the central aspect of the trochanter on the lateral view and advanced into position across the fracture site. An entry reamer was then advanced over the guidepin. A ball-tipped guidewire was advanced down the femoral canal and a size 400 mm length nail was selected. Sequential reamers were taken over the ball-tipped guidewire up to a size 13-1/2 mm reamer for a 12 mm diameter nail. The 400 mm length 125 degree neck angle 12 mm diameter [employer] nail was impacted in place and seated well into position. The targeting device was applied and a second incision was made at the proximal lateral femur for the targeting device and helical blade. A guidewire was advanced across the fracture site in the central aspect of the femoral head on AP and lateral views. A size 95 mm helical blade was selected and impacted into place and seated well. It was locked proximally with quarter turn backing off to allow for compression and a mild amount of compression was dialed in through the targeting device under fluoroscopy. The target device was removed and final x-rays were taken approximately which showed excellent alignment of the fracture site and hardware with appropriate tip apex distance of the helical blade. Next a distal interlocking screw was placed percutaneously using perfect [place] technique through the static hole and a 42 mm screw was advanced into position had good purchase and fixation. Final x-rays were taken distally. The wounds were thoroughly irrigated.   Fascia layers were closed with 0 Vicryl interrupted sutures. Deep subcutaneous layers were closed with buried 2-0 Vicryl and skin was closed with staples. Sterile dressings were applied with 4 x 8's and Xeroform ABD and Tegaderm. The patient was awakened and taken to PACU in stable condition. All sponge and needle counts were correct. Postoperative plan:  Patient will be weightbearing as tolerated and will be on DVT prophylaxis for 4 weeks.     Electronically signed by Venancio Morgan MD on 5/1/2022 at 9:11 AM

## 2022-05-02 LAB
ALBUMIN SERPL-MCNC: 3.1 GM/DL (ref 3.4–5)
ALP BLD-CCNC: 80 IU/L (ref 40–128)
ALT SERPL-CCNC: 8 U/L (ref 10–40)
ANION GAP SERPL CALCULATED.3IONS-SCNC: 9 MMOL/L (ref 4–16)
AST SERPL-CCNC: 11 IU/L (ref 15–37)
BASOPHILS ABSOLUTE: 0 K/CU MM
BASOPHILS RELATIVE PERCENT: 0.3 % (ref 0–1)
BILIRUB SERPL-MCNC: 0.6 MG/DL (ref 0–1)
BUN BLDV-MCNC: 28 MG/DL (ref 6–23)
CALCIUM SERPL-MCNC: 9.7 MG/DL (ref 8.3–10.6)
CHLORIDE BLD-SCNC: 100 MMOL/L (ref 99–110)
CO2: 25 MMOL/L (ref 21–32)
CREAT SERPL-MCNC: 1 MG/DL (ref 0.6–1.1)
DIFFERENTIAL TYPE: ABNORMAL
EOSINOPHILS ABSOLUTE: 0.1 K/CU MM
EOSINOPHILS RELATIVE PERCENT: 0.9 % (ref 0–3)
GFR AFRICAN AMERICAN: >60 ML/MIN/1.73M2
GFR NON-AFRICAN AMERICAN: 53 ML/MIN/1.73M2
GLUCOSE BLD-MCNC: 116 MG/DL (ref 70–99)
GLUCOSE BLD-MCNC: 128 MG/DL (ref 70–99)
GLUCOSE BLD-MCNC: 129 MG/DL (ref 70–99)
GLUCOSE BLD-MCNC: 144 MG/DL (ref 70–99)
GLUCOSE BLD-MCNC: 146 MG/DL (ref 70–99)
HCT VFR BLD CALC: 31 % (ref 37–47)
HEMOGLOBIN: 9.4 GM/DL (ref 12.5–16)
IMMATURE NEUTROPHIL %: 0.8 % (ref 0–0.43)
LYMPHOCYTES ABSOLUTE: 1.2 K/CU MM
LYMPHOCYTES RELATIVE PERCENT: 8.9 % (ref 24–44)
MCH RBC QN AUTO: 30.2 PG (ref 27–31)
MCHC RBC AUTO-ENTMCNC: 30.3 % (ref 32–36)
MCV RBC AUTO: 99.7 FL (ref 78–100)
MONOCYTES ABSOLUTE: 2 K/CU MM
MONOCYTES RELATIVE PERCENT: 14.2 % (ref 0–4)
NUCLEATED RBC %: 0 %
PDW BLD-RTO: 13.6 % (ref 11.7–14.9)
PLATELET # BLD: 196 K/CU MM (ref 140–440)
PMV BLD AUTO: 9.6 FL (ref 7.5–11.1)
POTASSIUM SERPL-SCNC: 4.3 MMOL/L (ref 3.5–5.1)
RBC # BLD: 3.11 M/CU MM (ref 4.2–5.4)
SEGMENTED NEUTROPHILS ABSOLUTE COUNT: 10.4 K/CU MM
SEGMENTED NEUTROPHILS RELATIVE PERCENT: 74.9 % (ref 36–66)
SODIUM BLD-SCNC: 134 MMOL/L (ref 135–145)
TOTAL IMMATURE NEUTOROPHIL: 0.11 K/CU MM
TOTAL NUCLEATED RBC: 0 K/CU MM
TOTAL PROTEIN: 5.2 GM/DL (ref 6.4–8.2)
WBC # BLD: 13.9 K/CU MM (ref 4–10.5)

## 2022-05-02 PROCEDURE — 80053 COMPREHEN METABOLIC PANEL: CPT

## 2022-05-02 PROCEDURE — 1200000000 HC SEMI PRIVATE

## 2022-05-02 PROCEDURE — 94640 AIRWAY INHALATION TREATMENT: CPT

## 2022-05-02 PROCEDURE — 82962 GLUCOSE BLOOD TEST: CPT

## 2022-05-02 PROCEDURE — 6370000000 HC RX 637 (ALT 250 FOR IP): Performed by: ORTHOPAEDIC SURGERY

## 2022-05-02 PROCEDURE — 2580000003 HC RX 258: Performed by: ORTHOPAEDIC SURGERY

## 2022-05-02 PROCEDURE — 94761 N-INVAS EAR/PLS OXIMETRY MLT: CPT

## 2022-05-02 PROCEDURE — 6360000002 HC RX W HCPCS: Performed by: ORTHOPAEDIC SURGERY

## 2022-05-02 PROCEDURE — 97535 SELF CARE MNGMENT TRAINING: CPT

## 2022-05-02 PROCEDURE — 97530 THERAPEUTIC ACTIVITIES: CPT

## 2022-05-02 PROCEDURE — 85025 COMPLETE CBC W/AUTO DIFF WBC: CPT

## 2022-05-02 PROCEDURE — 36415 COLL VENOUS BLD VENIPUNCTURE: CPT

## 2022-05-02 PROCEDURE — 51798 US URINE CAPACITY MEASURE: CPT

## 2022-05-02 PROCEDURE — 2700000000 HC OXYGEN THERAPY PER DAY

## 2022-05-02 RX ADMIN — HYDROMORPHONE HYDROCHLORIDE 0.5 MG: 1 INJECTION, SOLUTION INTRAMUSCULAR; INTRAVENOUS; SUBCUTANEOUS at 22:09

## 2022-05-02 RX ADMIN — HYDROMORPHONE HYDROCHLORIDE 0.5 MG: 1 INJECTION, SOLUTION INTRAMUSCULAR; INTRAVENOUS; SUBCUTANEOUS at 13:12

## 2022-05-02 RX ADMIN — OXYCODONE HYDROCHLORIDE 5 MG: 5 TABLET ORAL at 08:54

## 2022-05-02 RX ADMIN — ATORVASTATIN CALCIUM 10 MG: 10 TABLET, FILM COATED ORAL at 08:56

## 2022-05-02 RX ADMIN — INSULIN LISPRO 1 UNITS: 100 INJECTION, SOLUTION INTRAVENOUS; SUBCUTANEOUS at 22:14

## 2022-05-02 RX ADMIN — DONEPEZIL HYDROCHLORIDE 5 MG: 5 TABLET, FILM COATED ORAL at 22:16

## 2022-05-02 RX ADMIN — HYDROMORPHONE HYDROCHLORIDE 0.5 MG: 1 INJECTION, SOLUTION INTRAMUSCULAR; INTRAVENOUS; SUBCUTANEOUS at 06:09

## 2022-05-02 RX ADMIN — LOSARTAN POTASSIUM 100 MG: 100 TABLET, FILM COATED ORAL at 08:54

## 2022-05-02 RX ADMIN — SODIUM CHLORIDE, POTASSIUM CHLORIDE, SODIUM LACTATE AND CALCIUM CHLORIDE: 600; 310; 30; 20 INJECTION, SOLUTION INTRAVENOUS at 23:18

## 2022-05-02 RX ADMIN — SODIUM CHLORIDE, PRESERVATIVE FREE 10 ML: 5 INJECTION INTRAVENOUS at 22:09

## 2022-05-02 RX ADMIN — ENOXAPARIN SODIUM 40 MG: 100 INJECTION SUBCUTANEOUS at 08:53

## 2022-05-02 RX ADMIN — BUDESONIDE AND FORMOTEROL FUMARATE DIHYDRATE 2 PUFF: 160; 4.5 AEROSOL RESPIRATORY (INHALATION) at 21:18

## 2022-05-02 RX ADMIN — SODIUM CHLORIDE, POTASSIUM CHLORIDE, SODIUM LACTATE AND CALCIUM CHLORIDE: 600; 310; 30; 20 INJECTION, SOLUTION INTRAVENOUS at 20:47

## 2022-05-02 RX ADMIN — AMLODIPINE BESYLATE 10 MG: 10 TABLET ORAL at 08:54

## 2022-05-02 RX ADMIN — SERTRALINE HYDROCHLORIDE 25 MG: 50 TABLET ORAL at 22:16

## 2022-05-02 RX ADMIN — HYDROCODONE BITARTRATE AND ACETAMINOPHEN 1 TABLET: 5; 325 TABLET ORAL at 02:38

## 2022-05-02 RX ADMIN — PANTOPRAZOLE 20 MG: 20 TABLET, DELAYED RELEASE ORAL at 08:54

## 2022-05-02 RX ADMIN — Medication 400 UNITS: at 08:59

## 2022-05-02 ASSESSMENT — PAIN SCALES - WONG BAKER
WONGBAKER_NUMERICALRESPONSE: 2
WONGBAKER_NUMERICALRESPONSE: 8
WONGBAKER_NUMERICALRESPONSE: 2

## 2022-05-02 ASSESSMENT — PAIN - FUNCTIONAL ASSESSMENT: PAIN_FUNCTIONAL_ASSESSMENT: PREVENTS OR INTERFERES SOME ACTIVE ACTIVITIES AND ADLS

## 2022-05-02 ASSESSMENT — PAIN DESCRIPTION - ORIENTATION: ORIENTATION: LEFT

## 2022-05-02 ASSESSMENT — PAIN SCALES - GENERAL
PAINLEVEL_OUTOF10: 8

## 2022-05-02 ASSESSMENT — PAIN DESCRIPTION - ONSET: ONSET: ON-GOING

## 2022-05-02 ASSESSMENT — PAIN DESCRIPTION - PAIN TYPE: TYPE: ACUTE PAIN;SURGICAL PAIN

## 2022-05-02 ASSESSMENT — PAIN DESCRIPTION - FREQUENCY: FREQUENCY: CONTINUOUS

## 2022-05-02 ASSESSMENT — PAIN DESCRIPTION - LOCATION
LOCATION: HIP
LOCATION: HIP;ARM

## 2022-05-02 NOTE — PROGRESS NOTES
Meliza Crabtree (01/77/0319)    Daily Progress Note-  Everett Pugh MD,                 Today's Date:   5/2/2022          Subjective:     Doing well postoperatively. Pain controlled  Patient seems confused today and does not complain of any pain at rest    Objective:   Patient Vitals for the past 4 hrs:   BP Temp Temp src Pulse Resp SpO2   05/02/22 0915 (!) 114/55 98.5 °F (36.9 °C) Oral 62 20 97 %         Physical Exam:     The patient is awake but confused  Resting comfortably in bed    Operative extremity:    The dressing is clean dry and intact  Sensation and motor function intact distally      LABS   CBC:   Recent Labs     04/30/22  0315 05/01/22  1403 05/02/22  0507   WBC 9.4 15.3* 13.9*   HGB 10.4* 10.5* 9.4*    230 196           Assessment and Plan     1. POD # 1, Hip trochanteric nail    1:  Physical therapy consult for mobilization   -WBAT   -ROM as tolerated  2:  DVT prophylaxis   -Lovenox x4 weeks  3:  Continue Pain Control   -wean to PO meds  4.   Medical management per hospitalist service  5:  D/C Planning:     -Victor M Pugh MD,

## 2022-05-02 NOTE — PROGRESS NOTES
Physical Therapy    Physical Therapy Treatment Note  Name: Lotus Morales MRN: [id number] :   1935   Date:  2022   Admission Date: 2022 Room:  [address] [place] [address]   Restrictions/Precautions:  Restrictions/Precautions  Restrictions/Precautions: Fall Risk,General Precautions,Weight Bearing   Lower Extremity Weight Bearing Restrictions  Left Lower Extremity Weight Bearing: Weight Bearing As Tolerated Upper Extremity Weight Bearing Restrictions  Other: 4 L of nc O2 in place on eval   L femoral intertrochanteric fx, POD 1 IM nailing, WBAT on LLE. Communication with other providers:  Per nurse ok to tx and reports pt getting pain meds before tx and then gave oral pain meds during tx session  Subjective:  Patient states:  Pt lethargic and constantly stating \"it hurts, it hurts. \"  Pain:   Location, Type, Intensity (0/10 to 10/10): pt unable to give pain rating but did exhibit pain with mobility and sitting EOB. Objective:    Observation:  Pt very lethargic and unable to follow cues. Pt had legs floating on pillow at start but left leg was internally rotated. Pt's left leg was placed in better alinement with pillow between legs and pillow between foot board and pts foot at end of tx session, pillow also placed under left arm to help keep it in bed. Treatment, including education/measures:  Bed placed in chair position x 15-20 to allow pt to acclimate to sitting position. Pt working with MICHEL and also attempting to get pt to participate in tx session. Bed returned to sup with HOB up and pt was max/dependent of 2 using bed pad to transfers to sitting EOB. Pt needing mod assist of 2 for sitting. Pt sat EOB x 20 minutes and attempting to cue pt for wt shift forward but tends to have posterior lean.   Pt dependent of 2 for sit to sup  Bed placed in trendelenburg for scooting to [facility] dependent of 2 and nurse holding left leg for safety  Assessment / Impression:      Patient's tolerance of treatment:  poor  Adverse Reaction: pain  Significant change in status and impact:  na  Barriers to improvement:  pain  Plan for Next Session:    Cont.  POC  Time in:  0835  Time out:  0915  Timed treatment minutes:  40  Total treatment time:  40    Previously filed items:  Social/Functional History  Additional Comments: disoriented, confused, answers changing during assessment ; states was ind w/ mobility w/o device prev, assist w/ ADL's home mgmt; from [address] [place] [address], will need further info clarified 2/2 impaired cog  Patient Goals   Patient goals : return home  Long Term Goals  Time Frame for Long term goals : 1 week  Long term goal 1: pt will complete bed mobility at mod A  Long term goal 2: pt will complete transfers at min A  Long term goal 3: pt will ambulate 25 ft using FWW at min A       Electronically signed by:    Toby Marr PTA  5/2/2022, 8:22 AM

## 2022-05-02 NOTE — PROGRESS NOTES
Occupational Therapy    Occupational Therapy Treatment Note  Name: Duane Del Angel MRN: [id number] :   1935   Date:  2022   Admission Date: 2022 Room:  [address] [place] [address]-A   Primary Problem:  L femoral intertrochanteric fx  Restrictions/Precautions: General Precautions, Fall Risk, L LE WBAT   Communication with other providers:   Per RN Kristi pt clear to participate, Cotx with PTA [facility] Ashlie for increased safety  Subjective:  Patient states: \"It hurts. \"   Pain: pt groaning out in pain with movement, unable to rate. Nurse reports giving pt pain medication prior to session and during. Objective:    Observation: Pt presented supine in bed, pt appeared fatigued/confused secondary to pain. Objective Measures:  3L o2, Tele, Pulse Ox, IV  Treatment, including education:  Therapeutic Activity Training:   Therapeutic activity training was instructed today. Cues were given for safety, sequence, UE/LE placement, awareness, and balance. Activities performed today included bed mobility training, sup-sit. Pt positioned in chair position/high baltazar's for X15 min to arouse pt and acclimate B LE to seated position. Pt returned to supine in preparation for bed mobility. Supine <> EOB with Max-Dep X2 with max cues for technique d/t pt not releasing B UE's from handrails. Pt instructed through sitting EOB with Mod A X2 with max cues d/t posterior leaning for X20 min for increased sitting tolerance needed for self-care routine. EOB <> supine with Max A X2 and max cues for sequencing. Trendelenburg to reposition/scoot up in bed with Dep X2 and nurse managing L LE. Pt required Max A to position R UE d/t pt frequently dangling off side of bed. Self Care Training:   Cues were given for safety, sequence, UE/LE placement, visual cues, and balance. Activities performed today included  Hygiene and self-feeding.     While in high baltazar's pt instructed through hygiene task of washing face with SBA and max verbal/tactile cues for initiation of task. Once repositioned in bed at end of session pt set up with tray and required assistance holding cup and pt able to drink with straw, pt took X1 bite with max A and pt required extended time and max cues to chew/swallow, nursing notified. Tasks done for increased activity tolerance and strength needed for ADL routine. Patient educated on role of OT , benefits of OT and rationale for therapeutic intervention. Educated on need to be patient advocate, benefit of EOB activity. Patient left safely in bed at end of session, with call light in reach, alarm on and nursing aware. Assessment / Impression:      Patient's tolerance of treatment: Poor  Adverse Reaction: pain  Significant change in status and impact:  none  Barriers to improvement: Pain  Plan for Next Session:    Cont OT POC  Time in:  835  Time out:  915  Timed treatment minutes:  40  Total treatment time:  40            Electronically signed by:     KIRBY Rivas,   5/2/2022, 9:17 AM

## 2022-05-02 NOTE — CARE COORDINATION
LSW reviewed chart/Pt diagnosed with dementia. LSW call to Pt son/TYRONE Sweeney. LSW intorduced self and role of LSW. Pt is from [facility]. Pt son would like a referral to [facility].  Call to [phone] with the referral.

## 2022-05-02 NOTE — PLAN OF CARE

## 2022-05-02 NOTE — PROGRESS NOTES
Hospitalist Progress Note      Name:  Deepak Flores /Age/Sex: 1935  (81 y.o. female)   MRN & CSN:  [id number] & [id number] Admission Date/Time: 2022 11:37 AM   Location:  1120/1120-A PCP: Destini Castellanos [address] [place] [address] Day: 3    Assessment and Plan:   Deepak Flores is a 81 y.o.  female  who presents with Closed left hip fracture, initial encounter ([facility] [address]    1. Left intertrochanteric femur fracture. -  Secondary to mechanical fall (ambulated without her walker).     - Will place on as needed pain medicines. -  Maintain on fall precautions.     - Orthopedic surgery consult and input appreciated    2. Fall at nursing home, initial encounter.     - This appears to be mechanical in nature.     - Continue fall precautions. 3. Diabetes type 2 with most recent hemoglobin A1c of 6.0.    - Continue sliding scale insulin.    - Monitor Accu-Check closely. 4. Essential hypertension.  -  Blood pressure is currently uncontrolled, likely due to pain. - Adjust pain meds for better pain control. Continue home antihypertensive regimen. Add PRN IV hydralazine. 5. Other comorbidities:  history of CAD, chronic diastolic heart failure (most recent echocardiogram with grade 3 diastolic dysfunction), GERD, dementia, essential hypertension, normocytic anemia.       Prophylaxis: SC lovenox  Code status: Full code    Diet ADULT DIET; Regular   DVT Prophylaxis [] Lovenox, []  Heparin, [] SCDs, [] Ambulation   GI Prophylaxis [] PPI,  [] H2 Blocker,  [] Carafate,  [] Diet/Tube Feeds   Code Status Full Code   Disposition Patient requires continued admission due to Fracture   MDM [] Low, [] Moderate,[]  High     History of Present Illness:     Chief Complaint: Closed left hip fracture, initial encounter [facility])   Patient seen and evaluated in the room. She is observed laying in bed and offers no new medical complaint. Objective:        Intake/Output Summary (Last 24 hours) at 2022 1511  Last data filed at 5/2/2022 0930  Gross per 24 hour   Intake 300 ml   Output 300 ml   Net 0 ml      Vitals:   Vitals:    05/02/22 1459   BP: (!) 115/52   Pulse: 58   Resp: 14   Temp: 98.8 °F (37.1 °C)   SpO2: 95%     Physical Exam:   GEN Awake female, sitting upright in bed in no apparent distress. Appears given age. EYES Pupils are equally round. No scleral erythema, discharge, or conjunctivitis. HENT Mucous membranes are moist. Oral pharynx without exudates, no evidence of thrush. NECK Supple, no apparent thyromegaly or masses. RESP Clear to auscultation, no wheezes, rales or rhonchi. Symmetric chest movement while on room air. CARDIO/VASC S1/S2 auscultated. Regular rate without appreciable murmurs, rubs, or gallops. No JVD or carotid bruits. Peripheral pulses equal bilaterally and palpable. No peripheral edema. GI Abdomen is soft without significant tenderness, masses, or guarding. Bowel sounds are normoactive. Rectal exam deferred.  No costovertebral angle tenderness. Normal appearing external genitalia. Tello catheter is not present. HEME/LYMPH No palpable cervical lymphadenopathy and no hepatosplenomegaly. No petechiae or ecchymoses. MSK No gross joint deformities. SKIN Normal coloration, warm, dry. NEURO Cranial nerves appear grossly intact, normal speech, no lateralizing weakness. PSYCH Awake, alert, oriented x 4. Affect appropriate.     Medications:   Medications:    enoxaparin  40 mg SubCUTAneous Daily    atorvastatin  10 mg Oral Daily    budesonide-formoterol  2 puff Inhalation BID    donepezil  5 mg Oral Nightly    ferrous sulfate  325 mg Oral Every Other Day    losartan  100 mg Oral Daily    pantoprazole  20 mg Oral Daily    sertraline  25 mg Oral Nightly    Vitamin D  1,000 Units Oral Daily    vitamin E  400 Units Oral Daily    sodium chloride flush  5-40 mL IntraVENous 2 times per day    amLODIPine  10 mg Oral Daily    insulin lispro  0-12 Units SubCUTAneous TID     insulin lispro  0-6 Units SubCUTAneous Nightly      Infusions:    lactated ringers Stopped (05/02/22 1026)    sodium chloride      dextrose       PRN Meds: oxyCODONE, 5 mg, Q4H PRN  hydrALAZINE (APRESOLINE) ivpb, 10 mg, Q6H PRN  HYDROmorphone, 0.25 mg, Q3H PRN   Or  HYDROmorphone, 0.5 mg, Q3H PRN  acetaminophen, 650 mg, Q4H PRN  bisacodyl, 5 mg, Daily PRN  HYDROcodone-acetaminophen, 1 tablet, Q4H PRN  acetaminophen, 650 mg, Q6H PRN   Or  acetaminophen, 650 mg, Q6H PRN  albuterol sulfate HFA, 2 puff, Q6H PRN  sodium chloride flush, 10 mL, PRN  sodium chloride, , PRN  ondansetron, 4 mg, Q8H PRN   Or  ondansetron, 4 mg, Q6H PRN  polyethylene glycol, 17 g, Daily PRN  dextrose, 12.5 g, PRN  glucagon (rDNA), 1 mg, PRN  dextrose, 100 mL/hr, PRN  glucose, 16 g, PRN          Patient is still admitted because Fracture. The anticipated discharge is in greater than 24 hours.      Electronically signed by Azra Goodman DO on 5/2/2022 at 3:11 PM

## 2022-05-03 LAB
ALBUMIN SERPL-MCNC: 3 GM/DL (ref 3.4–5)
ALP BLD-CCNC: 78 IU/L (ref 40–129)
ALT SERPL-CCNC: 7 U/L (ref 10–40)
ANION GAP SERPL CALCULATED.3IONS-SCNC: 10 MMOL/L (ref 4–16)
AST SERPL-CCNC: 11 IU/L (ref 15–37)
BASOPHILS ABSOLUTE: 0.1 K/CU MM
BASOPHILS RELATIVE PERCENT: 0.3 % (ref 0–1)
BILIRUB SERPL-MCNC: 0.7 MG/DL (ref 0–1)
BUN BLDV-MCNC: 43 MG/DL (ref 6–23)
CALCIUM SERPL-MCNC: 9.6 MG/DL (ref 8.3–10.6)
CHLORIDE BLD-SCNC: 103 MMOL/L (ref 99–110)
CO2: 24 MMOL/L (ref 21–32)
CREAT SERPL-MCNC: 1.9 MG/DL (ref 0.6–1.1)
DIFFERENTIAL TYPE: ABNORMAL
EOSINOPHILS ABSOLUTE: 0.1 K/CU MM
EOSINOPHILS RELATIVE PERCENT: 0.4 % (ref 0–3)
GFR AFRICAN AMERICAN: 30 ML/MIN/1.73M2
GFR NON-AFRICAN AMERICAN: 25 ML/MIN/1.73M2
GLUCOSE BLD-MCNC: 111 MG/DL (ref 70–99)
GLUCOSE BLD-MCNC: 112 MG/DL (ref 70–99)
GLUCOSE BLD-MCNC: 121 MG/DL (ref 70–99)
GLUCOSE BLD-MCNC: 144 MG/DL (ref 70–99)
GLUCOSE BLD-MCNC: 191 MG/DL (ref 70–99)
HCT VFR BLD CALC: 27.8 % (ref 37–47)
HEMOGLOBIN: 8.5 GM/DL (ref 12.5–16)
IMMATURE NEUTROPHIL %: 0.7 % (ref 0–0.43)
LYMPHOCYTES ABSOLUTE: 1.5 K/CU MM
LYMPHOCYTES RELATIVE PERCENT: 8.7 % (ref 24–44)
MCH RBC QN AUTO: 30.6 PG (ref 27–31)
MCHC RBC AUTO-ENTMCNC: 30.6 % (ref 32–36)
MCV RBC AUTO: 100 FL (ref 78–100)
MONOCYTES ABSOLUTE: 2.2 K/CU MM
MONOCYTES RELATIVE PERCENT: 12.6 % (ref 0–4)
NUCLEATED RBC %: 0 %
PDW BLD-RTO: 13.8 % (ref 11.7–14.9)
PLATELET # BLD: 194 K/CU MM (ref 140–440)
PMV BLD AUTO: 9.6 FL (ref 7.5–11.1)
POTASSIUM SERPL-SCNC: 4.7 MMOL/L (ref 3.5–5.1)
RBC # BLD: 2.78 M/CU MM (ref 4.2–5.4)
SEGMENTED NEUTROPHILS ABSOLUTE COUNT: 13.4 K/CU MM
SEGMENTED NEUTROPHILS RELATIVE PERCENT: 77.3 % (ref 36–66)
SODIUM BLD-SCNC: 137 MMOL/L (ref 135–145)
TOTAL IMMATURE NEUTOROPHIL: 0.12 K/CU MM
TOTAL NUCLEATED RBC: 0 K/CU MM
TOTAL PROTEIN: 4.9 GM/DL (ref 6.4–8.2)
WBC # BLD: 17.4 K/CU MM (ref 4–10.5)

## 2022-05-03 PROCEDURE — 94150 VITAL CAPACITY TEST: CPT

## 2022-05-03 PROCEDURE — 6370000000 HC RX 637 (ALT 250 FOR IP): Performed by: ORTHOPAEDIC SURGERY

## 2022-05-03 PROCEDURE — 2700000000 HC OXYGEN THERAPY PER DAY

## 2022-05-03 PROCEDURE — 94640 AIRWAY INHALATION TREATMENT: CPT

## 2022-05-03 PROCEDURE — 6360000002 HC RX W HCPCS: Performed by: ORTHOPAEDIC SURGERY

## 2022-05-03 PROCEDURE — 97530 THERAPEUTIC ACTIVITIES: CPT

## 2022-05-03 PROCEDURE — 80053 COMPREHEN METABOLIC PANEL: CPT

## 2022-05-03 PROCEDURE — 36415 COLL VENOUS BLD VENIPUNCTURE: CPT

## 2022-05-03 PROCEDURE — 94761 N-INVAS EAR/PLS OXIMETRY MLT: CPT

## 2022-05-03 PROCEDURE — 1200000000 HC SEMI PRIVATE

## 2022-05-03 PROCEDURE — 82962 GLUCOSE BLOOD TEST: CPT

## 2022-05-03 PROCEDURE — 85025 COMPLETE CBC W/AUTO DIFF WBC: CPT

## 2022-05-03 PROCEDURE — 2580000003 HC RX 258: Performed by: ORTHOPAEDIC SURGERY

## 2022-05-03 PROCEDURE — 94664 DEMO&/EVAL PT USE INHALER: CPT

## 2022-05-03 RX ORDER — ENOXAPARIN SODIUM 100 MG/ML
30 INJECTION SUBCUTANEOUS DAILY
Status: DISCONTINUED | OUTPATIENT
Start: 2022-05-04 | End: 2022-05-14 | Stop reason: HOSPADM

## 2022-05-03 RX ADMIN — ENOXAPARIN SODIUM 40 MG: 100 INJECTION SUBCUTANEOUS at 09:00

## 2022-05-03 RX ADMIN — HYDROMORPHONE HYDROCHLORIDE 0.5 MG: 1 INJECTION, SOLUTION INTRAMUSCULAR; INTRAVENOUS; SUBCUTANEOUS at 04:27

## 2022-05-03 RX ADMIN — BUDESONIDE AND FORMOTEROL FUMARATE DIHYDRATE 2 PUFF: 160; 4.5 AEROSOL RESPIRATORY (INHALATION) at 08:16

## 2022-05-03 RX ADMIN — INSULIN LISPRO 1 UNITS: 100 INJECTION, SOLUTION INTRAVENOUS; SUBCUTANEOUS at 22:17

## 2022-05-03 RX ADMIN — SODIUM CHLORIDE, PRESERVATIVE FREE 10 ML: 5 INJECTION INTRAVENOUS at 08:43

## 2022-05-03 RX ADMIN — HYDROMORPHONE HYDROCHLORIDE 0.5 MG: 1 INJECTION, SOLUTION INTRAMUSCULAR; INTRAVENOUS; SUBCUTANEOUS at 08:42

## 2022-05-03 RX ADMIN — BUDESONIDE AND FORMOTEROL FUMARATE DIHYDRATE 2 PUFF: 160; 4.5 AEROSOL RESPIRATORY (INHALATION) at 19:38

## 2022-05-03 RX ADMIN — HYDROMORPHONE HYDROCHLORIDE 0.5 MG: 1 INJECTION, SOLUTION INTRAMUSCULAR; INTRAVENOUS; SUBCUTANEOUS at 18:52

## 2022-05-03 ASSESSMENT — PAIN SCALES - GENERAL
PAINLEVEL_OUTOF10: 8
PAINLEVEL_OUTOF10: 10
PAINLEVEL_OUTOF10: 6
PAINLEVEL_OUTOF10: 8
PAINLEVEL_OUTOF10: 10

## 2022-05-03 ASSESSMENT — PAIN DESCRIPTION - ONSET: ONSET: ON-GOING

## 2022-05-03 ASSESSMENT — PAIN DESCRIPTION - PAIN TYPE: TYPE: ACUTE PAIN

## 2022-05-03 ASSESSMENT — PAIN DESCRIPTION - ORIENTATION
ORIENTATION: LEFT
ORIENTATION: LEFT

## 2022-05-03 ASSESSMENT — PAIN DESCRIPTION - DESCRIPTORS: DESCRIPTORS: THROBBING

## 2022-05-03 ASSESSMENT — PAIN DESCRIPTION - LOCATION
LOCATION: HIP
LOCATION: HIP;ARM

## 2022-05-03 NOTE — PROGRESS NOTES
Speech Language Pathology      Swallowing assessment deferred at this time due to AMS. Pt is unarousable despite maximal verbal cues and repositioning. Nsg will contact speech if pt becomes adequate alert for participation.     Lyndsay Atkinson MA [facility] SLP  Speech Language Pathologist

## 2022-05-03 NOTE — PROGRESS NOTES
Hospitalist Progress Note      Name:  Violeta Aviles /Age/Sex: 1935  (81 y.o. female)   MRN & CSN:  [id number] & [id number] Admission Date/Time: 2022 11:37 AM   Location:  1120/1120-A PCP: Desiree Castillo, [facility] Day: 4    Assessment and Plan:   Violeta Aviles is a 81 y.o.  female  who presents with Closed left hip fracture, initial encounter ([facility] [address]    1. Left intertrochanteric femur fracture. -  Secondary to mechanical fall (ambulated without her walker).     - Will place on as needed pain medicines. -  Maintain on fall precautions.     - Orthopedic surgery consult and input appreciated     2. Fall at nursing home, initial encounter.     - This appears to be mechanical in nature.     - Continue fall precautions.     3. Diabetes type 2 with most recent hemoglobin A1c of 6.0.    - Continue sliding scale insulin.    - Monitor Accu-Check closely.     4. Essential hypertension.  -  Blood pressure is currently uncontrolled, likely due to pain. - Adjust pain meds for better pain control. Continue home antihypertensive regimen. Add PRN IV hydralazine.     5. Other comorbidities:  history of CAD, chronic diastolic heart failure (most recent echocardiogram with grade 3 diastolic dysfunction), GERD, dementia, essential hypertension, normocytic anemia.        Prophylaxis: SC lovenox  Code status: Full code       Diet ADULT DIET; Regular   DVT Prophylaxis [] Lovenox, []  Heparin, [] SCDs, [] Ambulation   GI Prophylaxis [] PPI,  [] H2 Blocker,  [] Carafate,  [] Diet/Tube Feeds   Code Status Full Code   Disposition Patient requires continued admission due to Fracture   MDM [] Low, [] Moderate,[]  High  Patient's risk as above due to Fracture     History of Present Illness:     Chief Complaint: Closed left hip fracture, initial encounter [facility])   Patient seen and evaluated in the room. She is observed laying in bed and offers no new medical complaint. Objective:        Intake/Output Summary (Last 24 hours) at 5/3/2022 1136  Last data filed at 5/3/2022 0815  Gross per 24 hour   Intake 60 ml   Output 300 ml   Net -240 ml      Vitals:   Vitals:    05/03/22 1120   BP: (!) 106/50   Pulse: 59   Resp: 14   Temp: 98.8 °F (37.1 °C)   SpO2: 90%     Physical Exam:   GEN Awake female, sitting upright in bed in no apparent distress. Appears given age. EYES Pupils are equally round. No scleral erythema, discharge, or conjunctivitis. HENT Mucous membranes are moist. Oral pharynx without exudates, no evidence of thrush. NECK Supple, no apparent thyromegaly or masses. RESP Clear to auscultation, no wheezes, rales or rhonchi. Symmetric chest movement while on room air. CARDIO/VASC S1/S2 auscultated. Regular rate without appreciable murmurs, rubs, or gallops. No JVD or carotid bruits. Peripheral pulses equal bilaterally and palpable. No peripheral edema. GI Abdomen is soft without significant tenderness, masses, or guarding. Bowel sounds are normoactive. Rectal exam deferred.  No costovertebral angle tenderness. Normal appearing external genitalia. Tello catheter is not present. HEME/LYMPH No palpable cervical lymphadenopathy and no hepatosplenomegaly. No petechiae or ecchymoses. MSK No gross joint deformities. SKIN Normal coloration, warm, dry. NEURO Cranial nerves appear grossly intact, normal speech, no lateralizing weakness. PSYCH Awake, alert, oriented x 4. Affect appropriate.     Medications:   Medications:    enoxaparin  40 mg SubCUTAneous Daily    atorvastatin  10 mg Oral Daily    budesonide-formoterol  2 puff Inhalation BID    donepezil  5 mg Oral Nightly    ferrous sulfate  325 mg Oral Every Other Day    losartan  100 mg Oral Daily    pantoprazole  20 mg Oral Daily    sertraline  25 mg Oral Nightly    Vitamin D  1,000 Units Oral Daily    vitamin E  400 Units Oral Daily    sodium chloride flush  5-40 mL IntraVENous 2 times per day    amLODIPine  10 mg Oral Daily    insulin lispro  0-12 Units SubCUTAneous TID WC    insulin lispro  0-6 Units SubCUTAneous Nightly      Infusions:    lactated ringers 100 mL/hr at 05/02/22 2318    sodium chloride      dextrose       PRN Meds: oxyCODONE, 5 mg, Q4H PRN  hydrALAZINE (APRESOLINE) ivpb, 10 mg, Q6H PRN  HYDROmorphone, 0.25 mg, Q3H PRN   Or  HYDROmorphone, 0.5 mg, Q3H PRN  acetaminophen, 650 mg, Q4H PRN  bisacodyl, 5 mg, Daily PRN  HYDROcodone-acetaminophen, 1 tablet, Q4H PRN  acetaminophen, 650 mg, Q6H PRN   Or  acetaminophen, 650 mg, Q6H PRN  albuterol sulfate HFA, 2 puff, Q6H PRN  sodium chloride flush, 10 mL, PRN  sodium chloride, , PRN  ondansetron, 4 mg, Q8H PRN   Or  ondansetron, 4 mg, Q6H PRN  polyethylene glycol, 17 g, Daily PRN  dextrose, 12.5 g, PRN  glucagon (rDNA), 1 mg, PRN  dextrose, 100 mL/hr, PRN  glucose, 16 g, PRN          Patient is still admitted because of fracture. The anticipated discharge is in greater than 24 hours.      Electronically signed by Shona Osgood, DO on 5/3/2022 at 11:36 AM

## 2022-05-03 NOTE — PROGRESS NOTES
Physical Therapy    Physical Therapy Treatment Note  Name: Kinsey Sullivan MRN: [id number] :   1935   Date:  5/3/2022   Admission Date: 2022 Room:  [address] [place] [address]   Restrictions/Precautions:  Restrictions/Precautions  Restrictions/Precautions: Fall Risk,General Precautions,Weight Bearing   Lower Extremity Weight Bearing Restrictions  Left Lower Extremity Weight Bearing: Weight Bearing As Tolerated Upper Extremity Weight Bearing Restrictions  Other: 4 L of nc O2 in place on eval   L femoral intertrochanteric fx, POD 1 IM nailing, WBAT on LLE. Communication with other providers:  Co-tx with MICHEL for safety. Per nurse ok to tx and in room at start of 2 and gave IV pain meds. Per nurse and then doctor will order swallowing eval due to pt will drink from cup with straw when offered but unable to feed and unable to take a bite of pancake when attempted by MICHEL in sitting. Subjective:  Patient states:  Pt very lethargic and saying \"oh my\" and \" I'm sorry\"  Pain:   Location, Type, Intensity (0/10 to 10/10):  Pt exhibits pain response with mobility. Objective:    Observation:  Pt very lethargic and unable to follow cues or commands. Treatment, including education/measures:  Bed placed in chair position x 10-15 to allow pt to acclimate to sitting position. Pt working with MICHEL and also attempting to get pt to participate in tx session. Bed returned to sup with HOB up and pt was max/dependent of 2 using bed pad to transfers to sitting EOB. Pt needing mod assist of 1 for sitting. Pt sat EOB x 10-15 minutes and attempting to cue pt for wt shift forward but tends to have posterior lean. Pt dependent of 2 for sit to sup  Bed placed in trendelenburg for scooting to [facility] dependent of 2  Safety  Patient left safely in the bed, with call light/phone in reach with alarm applied. Gait belt and mask were used for transfers and gait.   Assessment / Impression:      Patient's tolerance of treatment:  poor   Adverse Reaction: na  Significant change in status and impact:  na  Barriers to improvement:  Lethargy, strength, and safety  Plan for Next Session:    Cont.  POC  Time in:  0830  Time out:  0908  Timed treatment minutes: 38   Total treatment time:  45    Previously filed items:  Social/Functional History  Additional Comments: disoriented, confused, answers changing during assessment ; states was ind w/ mobility w/o device prev, assist w/ ADL's home mgmt; from [address] [place] [address], will need further info clarified 2/2 impaired cog  Patient Goals   Patient goals : return home  Long Term Goals  Time Frame for Long term goals : 1 week  Long term goal 1: pt will complete bed mobility at mod A  Long term goal 2: pt will complete transfers at min A  Long term goal 3: pt will ambulate 25 ft using FWW at min A       Electronically signed by:    Ever Montero PTA  5/3/2022, 8:21 AM

## 2022-05-03 NOTE — FLOWSHEET NOTE
Occupational Therapy Treatment Note  Name: Nyoka Bence MRN: [id number] :   1935   Date:  5/3/2022   Admission Date: 2022 Room:  [address] [place] [address]-     Primary Problem:  Closed left hip fracture    Restrictions/Precautions:  Restrictions/Precautions  Restrictions/Precautions: Fall Risk,General Precautions,Weight Bearing   Lower Extremity Weight Bearing Restrictions  Left Lower Extremity Weight Bearing: Weight Bearing As Tolerated Upper Extremity Weight Bearing Restrictions  Other: 3L of nc O2     Communication with other providers:  LUISA Parmar approved session and co tx with PTA [address] [place] [address] for activity tolerance and safety. Subjective:  Patient states:  \" Oh My, I'm sorry\"  Pt perseverated on speech throughout session often spelling words. Pain: Pt unable to rate pain however vocalized pain and yelling out throughout session (location, type, intensity)    Objective:    Observation:  Pt supine in bed with tension in BUE, moaning and throughout session. Objective Measures:  Pt alert to self. Treatment, including education:  Therapeutic Activity Training:   Therapeutic activity training was instructed today. Cues were given for safety, sequence, UE/LE placement, awareness, and balance. Activities performed today included bed mobility training, sup-sit, sit-stand, SPT. Pt supine in bed and progressed to chair position to assist in patients lethargy and tolerance of session. Pt noted increased difficulty maintaining upright posture and required BUE to support self. Pt demonstrated ability to sit in chair position approx 5-10 minutes. Pt trailed bite of food unable to grasp fork and tried to suck on food like a drink despite MAX verbal cues. Pt able to drink juice from straw with no difficulty and alerted MD and RN about speech consult. Pt completed supine to sit with DEP  A x2 with HOB elevated and use of bed pad. Pt seated edge of bed with MOD A x1 with slight retropulsion.  Pt unable to grasp brush sitting edge of bed and required DEP A for grooming. Pt demonstrated ability to tolerate approx 10-15 minutes and returned supine in bed with DEP A x2 with increased screaming at this time. Pt positioned with pillows for safe positioning in bed. Call light in reach bed alarm on. Assessment / Impression:    Patient's tolerance of treatment: poor  Adverse Reaction: pain  Significant change in status and impact:  none  Barriers to improvement: pain and cognition    Goals:  · Pt will complete all aspects of bed mobility for EOB/OOB ADLs w/ min A.- addressed  · Pt will complete UB ADLs w/ min A.  · Pt will complete LB ADLs w/ min A.  · Pt will complete all functional transfers to and from bed, chair, toilet, shower chair w/ min A.  · Pt will ambulate functional household distance w/ min A.  · Pt will complete all aspects of toileting task w/ min A.  · Pt will perform therex/theract in order to increase strength and functional activity tolerance necessary for increased independence w/ ADL routine.- addressed     Plan for Next Session:    Continue OT POC and progress EOB tolerance with simple grooming.     Time in:  0830  Time out:  0908  Timed treatment minutes:  38  Total treatment time:  45    Previously filed items:  Social/Functional History  Additional Comments: disoriented, confused, answers changing during assessment ; states was ind w/ mobility w/o device prev, assist w/ ADL's home mgmt; from [address] [place] [address], will need further info clarified 2/2 impaired cog       Electronically signed by:    KIRBY Mcgee,   5/3/2022, 9:09 AM

## 2022-05-03 NOTE — PROGRESS NOTES
RENAL DOSE ADJUSTMENT MADE PER P/T PROTOCOL    PREVIOUS ORDER:  Enoxaparin 40 mg subQ daily    Estimated Creatinine Clearance: 25 mL/min (A) (based on SCr of 1.9 mg/dL (H)). Recent Labs     05/01/22  1403 05/01/22  1403 05/02/22  0507 05/02/22  0507 05/03/22  0547   BUN 21  --  28*  --  43*   CREATININE 0.8   < > 1.0   < > 1.9*      < > 196   < > 194    < > = values in this interval not displayed.      NEW RENALLY ADJUSTED ORDER:  Enoxaparin 30 mg subQ daily    BIMAL Hernandez [facility]  5/3/2022 4:48 PM

## 2022-05-03 NOTE — PROGRESS NOTES
Deepak Flores (26/90[birth year])    Daily Progress Note-  Jessa Turner PA-C,                 Today's Date:   5/3/2022          Subjective:   Patient is awake in bed but disoriented to time, place, and setting. She was able to follow basic commands but unable to answer my questions. Pain appears controlled as she is comfortably resting in bed. Doing well postoperatively. Pain controlled    Objective:   Patient Vitals for the past 4 hrs:   BP Temp Temp src Pulse Resp SpO2   05/03/22 0600 (!) 124/57 98.1 °F (36.7 °C) Axillary 69 18 94 %   05/03/22 0557    60 20    05/03/22 0457     16    05/03/22 0400   Oral            Physical Exam:     The patient is awake and alert  Resting comfortably in bed      Operative extremity:    The dressing is clean dry and intact  Sensation and motor function intact distally  Papa sign negative  Popliteal pulse 2+  Patient able to follow commands to perform active motion of the left ankle and toes. Gentle passive motion of the hip elicited pain from the patient, however, she was unable to quantify or describe the pain and further passive movements were not pursued. LABS   CBC:   Recent Labs     05/01/22  1403 05/02/22  0507 05/03/22  0547   WBC 15.3* 13.9* 17.4*   HGB 10.5* 9.4* 8.5*    196 194           Assessment and Plan     1. POD # 1, Hip trochanteric nail    1:  Physical therapy consult for mobilization   -WBAT   -ROM as tolerated  2:  DVT prophylaxis   -  3:  Continue Pain Control   -wean to PO meds  4.   Medical management per hospitalist service  5:  D/C Planning:     -{D/C [id number]}         Jessa Turner PA-C,

## 2022-05-04 LAB
ANION GAP SERPL CALCULATED.3IONS-SCNC: 12 MMOL/L (ref 4–16)
BASOPHILS ABSOLUTE: 0.1 K/CU MM
BASOPHILS RELATIVE PERCENT: 0.3 % (ref 0–1)
BUN BLDV-MCNC: 60 MG/DL (ref 6–23)
CALCIUM SERPL-MCNC: 9.6 MG/DL (ref 8.3–10.6)
CHLORIDE BLD-SCNC: 101 MMOL/L (ref 99–110)
CO2: 23 MMOL/L (ref 21–32)
CREAT SERPL-MCNC: 2.1 MG/DL (ref 0.6–1.1)
DIFFERENTIAL TYPE: ABNORMAL
EOSINOPHILS ABSOLUTE: 0.1 K/CU MM
EOSINOPHILS RELATIVE PERCENT: 0.3 % (ref 0–3)
GFR AFRICAN AMERICAN: 27 ML/MIN/1.73M2
GFR NON-AFRICAN AMERICAN: 22 ML/MIN/1.73M2
GLUCOSE BLD-MCNC: 129 MG/DL (ref 70–99)
GLUCOSE BLD-MCNC: 135 MG/DL (ref 70–99)
GLUCOSE BLD-MCNC: 145 MG/DL (ref 70–99)
GLUCOSE BLD-MCNC: 154 MG/DL (ref 70–99)
GLUCOSE BLD-MCNC: 161 MG/DL (ref 70–99)
GLUCOSE BLD-MCNC: 162 MG/DL (ref 70–99)
HCT VFR BLD CALC: 29.8 % (ref 37–47)
HEMOGLOBIN: 9 GM/DL (ref 12.5–16)
IMMATURE NEUTROPHIL %: 0.8 % (ref 0–0.43)
LYMPHOCYTES ABSOLUTE: 1.4 K/CU MM
LYMPHOCYTES RELATIVE PERCENT: 8.3 % (ref 24–44)
MCH RBC QN AUTO: 30.3 PG (ref 27–31)
MCHC RBC AUTO-ENTMCNC: 30.2 % (ref 32–36)
MCV RBC AUTO: 100.3 FL (ref 78–100)
MONOCYTES ABSOLUTE: 2 K/CU MM
MONOCYTES RELATIVE PERCENT: 11.8 % (ref 0–4)
NUCLEATED RBC %: 0 %
PDW BLD-RTO: 14 % (ref 11.7–14.9)
PLATELET # BLD: 234 K/CU MM (ref 140–440)
PMV BLD AUTO: 9.4 FL (ref 7.5–11.1)
POTASSIUM SERPL-SCNC: 4.7 MMOL/L (ref 3.5–5.1)
RBC # BLD: 2.97 M/CU MM (ref 4.2–5.4)
SEGMENTED NEUTROPHILS ABSOLUTE COUNT: 13.1 K/CU MM
SEGMENTED NEUTROPHILS RELATIVE PERCENT: 78.5 % (ref 36–66)
SODIUM BLD-SCNC: 136 MMOL/L (ref 135–145)
TOTAL IMMATURE NEUTOROPHIL: 0.13 K/CU MM
TOTAL NUCLEATED RBC: 0 K/CU MM
WBC # BLD: 16.7 K/CU MM (ref 4–10.5)

## 2022-05-04 PROCEDURE — 80048 BASIC METABOLIC PNL TOTAL CA: CPT

## 2022-05-04 PROCEDURE — 82962 GLUCOSE BLOOD TEST: CPT

## 2022-05-04 PROCEDURE — 6360000002 HC RX W HCPCS: Performed by: ORTHOPAEDIC SURGERY

## 2022-05-04 PROCEDURE — 6370000000 HC RX 637 (ALT 250 FOR IP): Performed by: ORTHOPAEDIC SURGERY

## 2022-05-04 PROCEDURE — 85025 COMPLETE CBC W/AUTO DIFF WBC: CPT

## 2022-05-04 PROCEDURE — 51798 US URINE CAPACITY MEASURE: CPT

## 2022-05-04 PROCEDURE — 94761 N-INVAS EAR/PLS OXIMETRY MLT: CPT

## 2022-05-04 PROCEDURE — 36415 COLL VENOUS BLD VENIPUNCTURE: CPT

## 2022-05-04 PROCEDURE — 2580000003 HC RX 258: Performed by: INTERNAL MEDICINE

## 2022-05-04 PROCEDURE — 92610 EVALUATE SWALLOWING FUNCTION: CPT

## 2022-05-04 PROCEDURE — 2580000003 HC RX 258: Performed by: ORTHOPAEDIC SURGERY

## 2022-05-04 PROCEDURE — 2700000000 HC OXYGEN THERAPY PER DAY

## 2022-05-04 PROCEDURE — 1200000000 HC SEMI PRIVATE

## 2022-05-04 RX ORDER — SODIUM CHLORIDE, SODIUM LACTATE, POTASSIUM CHLORIDE, CALCIUM CHLORIDE 600; 310; 30; 20 MG/100ML; MG/100ML; MG/100ML; MG/100ML
INJECTION, SOLUTION INTRAVENOUS CONTINUOUS
Status: ACTIVE | OUTPATIENT
Start: 2022-05-04 | End: 2022-05-05

## 2022-05-04 RX ADMIN — SODIUM CHLORIDE, POTASSIUM CHLORIDE, SODIUM LACTATE AND CALCIUM CHLORIDE: 600; 310; 30; 20 INJECTION, SOLUTION INTRAVENOUS at 10:09

## 2022-05-04 RX ADMIN — INSULIN LISPRO 1 UNITS: 100 INJECTION, SOLUTION INTRAVENOUS; SUBCUTANEOUS at 20:57

## 2022-05-04 RX ADMIN — Medication 1000 UNITS: at 10:09

## 2022-05-04 RX ADMIN — HYDROMORPHONE HYDROCHLORIDE 0.5 MG: 1 INJECTION, SOLUTION INTRAMUSCULAR; INTRAVENOUS; SUBCUTANEOUS at 20:44

## 2022-05-04 RX ADMIN — INSULIN LISPRO 2 UNITS: 100 INJECTION, SOLUTION INTRAVENOUS; SUBCUTANEOUS at 10:12

## 2022-05-04 RX ADMIN — AMLODIPINE BESYLATE 10 MG: 10 TABLET ORAL at 10:09

## 2022-05-04 RX ADMIN — HYDROMORPHONE HYDROCHLORIDE 0.5 MG: 1 INJECTION, SOLUTION INTRAMUSCULAR; INTRAVENOUS; SUBCUTANEOUS at 10:06

## 2022-05-04 RX ADMIN — ENOXAPARIN SODIUM 30 MG: 100 INJECTION SUBCUTANEOUS at 10:09

## 2022-05-04 RX ADMIN — Medication 400 UNITS: at 10:08

## 2022-05-04 RX ADMIN — SODIUM CHLORIDE, PRESERVATIVE FREE 10 ML: 5 INJECTION INTRAVENOUS at 10:10

## 2022-05-04 RX ADMIN — SODIUM CHLORIDE, POTASSIUM CHLORIDE, SODIUM LACTATE AND CALCIUM CHLORIDE: 600; 310; 30; 20 INJECTION, SOLUTION INTRAVENOUS at 15:33

## 2022-05-04 RX ADMIN — PANTOPRAZOLE 20 MG: 20 TABLET, DELAYED RELEASE ORAL at 10:08

## 2022-05-04 RX ADMIN — ATORVASTATIN CALCIUM 10 MG: 10 TABLET, FILM COATED ORAL at 10:08

## 2022-05-04 RX ADMIN — HYDROMORPHONE HYDROCHLORIDE 0.5 MG: 1 INJECTION, SOLUTION INTRAMUSCULAR; INTRAVENOUS; SUBCUTANEOUS at 01:25

## 2022-05-04 RX ADMIN — DONEPEZIL HYDROCHLORIDE 5 MG: 5 TABLET, FILM COATED ORAL at 20:44

## 2022-05-04 RX ADMIN — SODIUM CHLORIDE, POTASSIUM CHLORIDE, SODIUM LACTATE AND CALCIUM CHLORIDE: 600; 310; 30; 20 INJECTION, SOLUTION INTRAVENOUS at 23:21

## 2022-05-04 RX ADMIN — SERTRALINE HYDROCHLORIDE 25 MG: 50 TABLET ORAL at 20:44

## 2022-05-04 ASSESSMENT — PAIN DESCRIPTION - DESCRIPTORS
DESCRIPTORS: SHARP;THROBBING
DESCRIPTORS: SHARP;THROBBING

## 2022-05-04 ASSESSMENT — PAIN SCALES - GENERAL
PAINLEVEL_OUTOF10: 8
PAINLEVEL_OUTOF10: 10
PAINLEVEL_OUTOF10: 10

## 2022-05-04 ASSESSMENT — PAIN SCALES - WONG BAKER: WONGBAKER_NUMERICALRESPONSE: 8

## 2022-05-04 ASSESSMENT — PAIN DESCRIPTION - ONSET
ONSET: ON-GOING
ONSET: ON-GOING

## 2022-05-04 ASSESSMENT — PAIN DESCRIPTION - FREQUENCY
FREQUENCY: CONTINUOUS
FREQUENCY: CONTINUOUS

## 2022-05-04 ASSESSMENT — PAIN - FUNCTIONAL ASSESSMENT
PAIN_FUNCTIONAL_ASSESSMENT: PREVENTS OR INTERFERES WITH ALL ACTIVE AND SOME PASSIVE ACTIVITIES
PAIN_FUNCTIONAL_ASSESSMENT: PREVENTS OR INTERFERES SOME ACTIVE ACTIVITIES AND ADLS

## 2022-05-04 ASSESSMENT — PAIN DESCRIPTION - LOCATION
LOCATION: HIP
LOCATION: HIP

## 2022-05-04 ASSESSMENT — PAIN DESCRIPTION - PAIN TYPE
TYPE: SURGICAL PAIN

## 2022-05-04 ASSESSMENT — PAIN DESCRIPTION - ORIENTATION
ORIENTATION: LEFT
ORIENTATION: LEFT

## 2022-05-04 NOTE — PROGRESS NOTES
Occupational Therapy Treatment Note  Name: Andres Gasca MRN: [id number] :   1935   Date:  2022   Admission Date: 2022 Room:  [address] [place] [address]-A     Attempted visit @947 am and @1315 with Dillon Villeda PTA. Pt kept repeating name over and over with eyes closed 95%. Asked to perform therapy pt initially agreed; however, trying reposition in order to perform bed mobility. Pt scream loudly in pain and did not rate. Demo increase confusion. Will continue to monitor and follow up tomorrow to see if pt appropriate for OT therapy.        Electronically signed by:    KIRBY Carrero,   2022, 2:23 PM

## 2022-05-04 NOTE — PROGRESS NOTES
Speech Language Pathology  Facility/Department: [address] [place] [address]   CLINICAL BEDSIDE SWALLOW EVALUATION    NAME: Maty Dan  : 1935  MRN: [id number]    ADMISSION DATE: 2022  ADMITTING DIAGNOSIS: has Complete heart block ([address] Cardiac pacemaker in situ; Edema of lower extremity; Essential hypertension; Dyslipidemia; Other heart failure ([address] Pulmonary HTN ([address] Chronic diastolic heart failure ([facility] and Closed left hip fracture, initial encounter [facility]) on their problem list.    Impressions: Maty Dan was seen for a bedside swallowing evaluation after being admitted to [facility] with femur fracture following a fall. Pt was alert, however required moderate prompting to participate in PO trials. Relevant medical hx includes hypertension, CAD, CHF and GERD. Pt denies hx of dysphagia PTA. Pt was positioned upright in bed and presented with a clear vocal quality and weak volitional cough. She was unable to follow oral mechanism examination at this time. PO trials of puree, soft solids and thin liquids by spoon/straw sips were given. Moderate oral dysphagia was observed characterized by oral holding, prolonged mastication, slow oral A-P transit and lingual residue. Suspect mild pharyngeal dysphagia characterized by delayed swallow initiation with adequate laryngeal elevation. Clear vocal quality and 0 overt s/s of aspiration were observed with all PO trials given. Recommend downgraded to pureed solids/thin liquids with aspiration/reflux precautions. SLP will continue to follow Maty Dan for diet tolerance monitoring and possible diet level upgrade with improved mental status.        ONSET DATE: this admission     Date of Eval: 2022  Evaluating Therapist: CHRISTOPHER Del Toro    Current Diet level:  Current Diet : Regular      Primary Complaint  Patient Complaint: pain with repositioning    Pain:  Pain Assessment  Pain Assessment: 0-10  Pain Level: 10  Millan-Baker Pain Rating: Lina a little bit  Patient's Stated Pain Goal: 0 - No pain  Pain Location: Hip  Pain Orientation: Left  Pain Descriptors: Sharp,Throbbing  Functional Pain Assessment: Prevents or interferes with all active and some passive activities  Pain Type: Surgical pain  Pain Frequency: Continuous  Pain Onset: On-going  Non-Pharmaceutical Pain Intervention(s): Emotional support  Response to Pain Intervention: Pain improved but above pain goal  Side Effects: Drowsy  Pain Assessment in Advanced Dementia (PAINAD)  Non-Pharmaceutical Pain Intervention(s): Emotional support  Response to Pain Intervention: Pain improved but above pain goal  Side Effects: Drowsy  Faces, Legs, Activity, Cry, and Consolability (FLACC)  Non-Pharmaceutical Pain Intervention(s): Emotional support  Response to Pain Intervention: Pain improved but above pain goal  Side Effects: Drowsy    Reason for Referral  Charline Mustafa was referred for a bedside swallow evaluation to assess the efficiency of her swallow function, identify signs and symptoms of aspiration and make recommendations regarding safe dietary consistencies, effective compensatory strategies, and safe eating environment. Impression  Dysphagia Diagnosis: Moderate oral stage dysphagia;Mild pharyngeal stage dysphagia  Dysphagia Outcome Severity Scale: Level 4: Mild moderate dysphagia- Intermittent supervision/cueing. One - two diet consistencies restricted     Treatment Plan  Requires SLP Intervention: Yes  Duration of Treatment: 2-3x weekly  D/C Recommendations: To be determined       Recommended Diet and Intervention        Recommended Form of Meds: PO     Therapeutic Interventions: Diet tolerance monitoring;Patient/Family education; Therapeutic PO trials with SLP    Compensatory Swallowing Strategies  Compensatory Swallowing Strategies : Small bites/sips; Total feed;Upright as possible for all oral intake;Eat/Feed slowly    Treatment/Goals  Short-term Goals  Timeframe for Short-term Goals: LOS or until goals are met  Goal 1: Pt will tolerate pureed solids/thin liquids without clinical evidence of aspiration 100%  Goal 2: Pt will participate in advanced trials for possible safe diet level advancement 10/10 trials  Goal 3: Pt/caregivers will demonstrate comprehension of recommendations/POC    General  Chart Reviewed: Yes  Behavior/Cognition: Alert; Cooperative;Pleasant mood  Respiratory Status: O2 via nasual cannula  Communication Observation: Dysarthria  Follows Directions: None  Dentition: Adequate  Patient Positioning: Upright in bed  Baseline Vocal Quality: Normal  Volitional Cough: Strong  Volitional Swallow: Delayed  Prior Dysphagia History: Pt denies           Vision/Hearing       Oral Motor Deficits       Oral Phase Dysfunction  Oral Phase  Oral Phase: Exceptions     Indicators of Pharyngeal Phase Dysfunction        Prognosis  Individuals consulted  Consulted and agree with results and recommendations: Patient;RN  RN Name: [place]    Education  Patient Education: recommendations/POC  Patient Education Response: Verbalizes understanding  Safety Devices in place: Yes  Type of devices:  All fall risk precautions in place       Therapy Time   1043-0543  30 mins            Kris Caraballo, [address]  5/4/2022 1:10 PM

## 2022-05-04 NOTE — PROGRESS NOTES
Physical Therapy  Attempted to see patient at 9260LN and patient in max pain, unable to have meaningful conversation and yelling out in pain with small LE movement. RN states the patient is to receive pain medication soon. Will attempt in PM.    Attempted to see patient at 1315PM. Patient continues to demo poor understanding of her situation and unable to have meaningful conversations. Unable to follow commands with therapeutic movement. Yells out in pain with small BLE movement. Unable to reposition patient d/t max resistance from patient. RN notified that patient demo's max pain and confusion. Will reassess with evaluating PT.      Jerry [place]  2:27 PM  5/4/2022

## 2022-05-04 NOTE — CARE COORDINATION
This RN CM left a VM for [facility] admissions for an update on referral made by CM yesterday.

## 2022-05-04 NOTE — PROGRESS NOTES
Hospitalist Progress Note      Name:  Violeta Aviles /Age/Sex: 1935  (81 y.o. female)   MRN & CSN:  [id number] & [id number] Admission Date/Time: 2022 11:37 AM   [facility] PCP: Desiree Castillo [facility] Day: 5  Discharge barrier/Reason for continued hospitalization: Resolution of BARRY and placement    Assessment and Plan:   Violeta Aviles is a 81 y.o.  female PMH significant for dementia, complete heart block status post pacemaker placement, chronic diastolic CHF, pulmonary hypertension, HTN, HLD and bilateral lower extremity edema who was admitted for close left hip fracture following a fall at home. Left intertrochanteric femur fracture:   -Sustained from a mechanical fall while ambulating without her walker  -Evaluated by orthopedics and status post left hip trochanteric nail    -Pain medication as needed  -Fall precautions  -PT/OT  -Case management assisting with discharge planning    Acute kidney injury:   -On gentle IV fluids. Hold home Cozaar until BARRY resolves. -Check bladder scans to rule out urinary retention as cause of BARRY. -Avoid nephrotoxic medications. DM2: A1c of 6.0%. BG in the 140 range.   -Continue current insulin regimen. Essential hypertension: BP mostly controlled but intermittently in the 140150/70 range  -Continue home antihypertensive regimen. As needed hydralazine. Moderate oral dysphagia with mild pharyngeal dysphagia:  -Speech therapy following. Diet per SLP recs    Other comorbidities:  history of CAD, chronic diastolic heart failure (most recent echocardiogram with grade 3 diastolic dysfunction), GERD, dementia,  normocytic anemia.     Prophylaxis: SC lovenox  Code status: Full code      Diet ADULT DIET;  Dysphagia - Pureed   DVT Prophylaxis [] Lovenox, []  Heparin, [] SCDs, [] Ambulation   GI Prophylaxis [] PPI,  [] H2 Blocker,  [] Carafate,  [] Diet/Tube Feeds   Code Status Full Code   MDM [] Low, [] Moderate,[] High       History of Present Illness:     Chief Complaint: Closed left hip fracture, initial encounter [facility])     Patient evaluated at bedside and is very sleepy but would awaken to loud sounds or stimuli. She denies shortness of breath or chest pain doses of quetiapine during encounter limiting HPI. Ten point ROS reviewed limited and negative  unless as noted above    Objective: Intake/Output Summary (Last 24 hours) at 5/4/2022 1416  Last data filed at 5/4/2022 1350  Gross per 24 hour   Intake 25 ml   Output    Net 25 ml        Vitals:   Vitals:    05/04/22 0349 05/04/22 1000 05/04/22 1036 05/04/22 1229   BP: 107/79 (!) 149/70     Pulse: 61 60  60   Resp: 16 19 19    Temp: 97.6 °F (36.4 °C) 97.8 °F (36.6 °C)     TempSrc: Axillary Oral     SpO2: 90%      Weight: 209 lb 14.1 oz (95.2 kg)      Height:            Physical Exam:   GEN: Patient is very sleepy but will awaken to painful stimuli loud sound. Laying in bed comfortably in NAD. Appears given age. HEENT: Dry mucous membranes. Unable to open eyes due to dried drainage between eyelids. RESP: Distant movement in bilateral lung lobes. Normal work of breathing. No wheezes/rhonchi/rales. CVS: RRR, S1, S2  GI/: Abdomen is soft, nontender, no organomegaly. . Bowel sounds normal  MSK/neuro: Decreased strength in the left lower extremity due to left femur fracture. Limited exam as patient is too sleepy to participate. SKIN: Normal coloration, warm, dry.       Medications:   Medications:    enoxaparin  30 mg SubCUTAneous Daily    atorvastatin  10 mg Oral Daily    budesonide-formoterol  2 puff Inhalation BID    donepezil  5 mg Oral Nightly    ferrous sulfate  325 mg Oral Every Other Day    pantoprazole  20 mg Oral Daily    sertraline  25 mg Oral Nightly    Vitamin D  1,000 Units Oral Daily    vitamin E  400 Units Oral Daily    sodium chloride flush  5-40 mL IntraVENous 2 times per day    amLODIPine  10 mg Oral Daily    insulin lispro 0-12 Units SubCUTAneous TID WC    insulin lispro  0-6 Units SubCUTAneous Nightly      Infusions:    lactated ringers 125 mL/hr at 05/04/22 1009    sodium chloride      dextrose       PRN Meds: oxyCODONE, 5 mg, Q4H PRN  hydrALAZINE (APRESOLINE) ivpb, 10 mg, Q6H PRN  HYDROmorphone, 0.25 mg, Q3H PRN   Or  HYDROmorphone, 0.5 mg, Q3H PRN  acetaminophen, 650 mg, Q4H PRN  bisacodyl, 5 mg, Daily PRN  HYDROcodone-acetaminophen, 1 tablet, Q4H PRN  acetaminophen, 650 mg, Q6H PRN   Or  acetaminophen, 650 mg, Q6H PRN  albuterol sulfate HFA, 2 puff, Q6H PRN  sodium chloride flush, 10 mL, PRN  sodium chloride, , PRN  ondansetron, 4 mg, Q8H PRN   Or  ondansetron, 4 mg, Q6H PRN  polyethylene glycol, 17 g, Daily PRN  dextrose, 12.5 g, PRN  glucagon (rDNA), 1 mg, PRN  dextrose, 100 mL/hr, PRN  glucose, 16 g, PRN          Electronically signed by Jennifer Cormier MD on 5/4/2022 at 2:16 PM

## 2022-05-05 ENCOUNTER — APPOINTMENT (OUTPATIENT)
Dept: GENERAL RADIOLOGY | Age: 87
DRG: 480 | End: 2022-05-05
Attending: INTERNAL MEDICINE
Payer: MEDICARE

## 2022-05-05 LAB
ANION GAP SERPL CALCULATED.3IONS-SCNC: 11 MMOL/L (ref 4–16)
BACTERIA: ABNORMAL /HPF
BASOPHILS ABSOLUTE: 0.1 K/CU MM
BASOPHILS RELATIVE PERCENT: 0.4 % (ref 0–1)
BILIRUBIN URINE: NEGATIVE MG/DL
BLOOD, URINE: NEGATIVE
BUN BLDV-MCNC: 64 MG/DL (ref 6–23)
CALCIUM SERPL-MCNC: 9.7 MG/DL (ref 8.3–10.6)
CHLORIDE BLD-SCNC: 103 MMOL/L (ref 99–110)
CLARITY: CLEAR
CO2: 24 MMOL/L (ref 21–32)
COLOR: YELLOW
CREAT SERPL-MCNC: 1.8 MG/DL (ref 0.6–1.1)
DIFFERENTIAL TYPE: ABNORMAL
EOSINOPHILS ABSOLUTE: 0.1 K/CU MM
EOSINOPHILS RELATIVE PERCENT: 0.4 % (ref 0–3)
GFR AFRICAN AMERICAN: 32 ML/MIN/1.73M2
GFR NON-AFRICAN AMERICAN: 27 ML/MIN/1.73M2
GLUCOSE BLD-MCNC: 114 MG/DL (ref 70–99)
GLUCOSE BLD-MCNC: 125 MG/DL (ref 70–99)
GLUCOSE BLD-MCNC: 127 MG/DL (ref 70–99)
GLUCOSE BLD-MCNC: 140 MG/DL (ref 70–99)
GLUCOSE BLD-MCNC: 160 MG/DL (ref 70–99)
GLUCOSE, URINE: NEGATIVE MG/DL
HCT VFR BLD CALC: 28.3 % (ref 37–47)
HEMOGLOBIN: 8.5 GM/DL (ref 12.5–16)
IMMATURE NEUTROPHIL %: 0.7 % (ref 0–0.43)
KETONES, URINE: NEGATIVE MG/DL
LEUKOCYTE ESTERASE, URINE: NEGATIVE
LYMPHOCYTES ABSOLUTE: 1.2 K/CU MM
LYMPHOCYTES RELATIVE PERCENT: 8.9 % (ref 24–44)
MCH RBC QN AUTO: 30.4 PG (ref 27–31)
MCHC RBC AUTO-ENTMCNC: 30 % (ref 32–36)
MCV RBC AUTO: 101.1 FL (ref 78–100)
MONOCYTES ABSOLUTE: 1.6 K/CU MM
MONOCYTES RELATIVE PERCENT: 12.5 % (ref 0–4)
MUCUS: ABNORMAL HPF
NITRITE URINE, QUANTITATIVE: POSITIVE
NUCLEATED RBC %: 0 %
PDW BLD-RTO: 14 % (ref 11.7–14.9)
PH, URINE: 5.5 (ref 5–8)
PLATELET # BLD: 228 K/CU MM (ref 140–440)
PMV BLD AUTO: 9.4 FL (ref 7.5–11.1)
POTASSIUM SERPL-SCNC: 4.4 MMOL/L (ref 3.5–5.1)
PROCALCITONIN: 0.47
PROTEIN UA: NEGATIVE MG/DL
RBC # BLD: 2.8 M/CU MM (ref 4.2–5.4)
RBC URINE: ABNORMAL /HPF (ref 0–6)
SEGMENTED NEUTROPHILS ABSOLUTE COUNT: 10.2 K/CU MM
SEGMENTED NEUTROPHILS RELATIVE PERCENT: 77.1 % (ref 36–66)
SODIUM BLD-SCNC: 138 MMOL/L (ref 135–145)
SPECIFIC GRAVITY UA: 1.02 (ref 1–1.03)
SQUAMOUS EPITHELIAL: 2 /HPF
TOTAL IMMATURE NEUTOROPHIL: 0.09 K/CU MM
TOTAL NUCLEATED RBC: 0 K/CU MM
TRICHOMONAS: ABNORMAL /HPF
UROBILINOGEN, URINE: 1 MG/DL (ref 0.2–1)
WBC # BLD: 13.2 K/CU MM (ref 4–10.5)
WBC UA: <1 /HPF (ref 0–5)

## 2022-05-05 PROCEDURE — 85025 COMPLETE CBC W/AUTO DIFF WBC: CPT

## 2022-05-05 PROCEDURE — 6370000000 HC RX 637 (ALT 250 FOR IP): Performed by: ORTHOPAEDIC SURGERY

## 2022-05-05 PROCEDURE — 6360000002 HC RX W HCPCS: Performed by: INTERNAL MEDICINE

## 2022-05-05 PROCEDURE — 97530 THERAPEUTIC ACTIVITIES: CPT

## 2022-05-05 PROCEDURE — 1200000000 HC SEMI PRIVATE

## 2022-05-05 PROCEDURE — 80048 BASIC METABOLIC PNL TOTAL CA: CPT

## 2022-05-05 PROCEDURE — 71045 X-RAY EXAM CHEST 1 VIEW: CPT

## 2022-05-05 PROCEDURE — 36415 COLL VENOUS BLD VENIPUNCTURE: CPT

## 2022-05-05 PROCEDURE — 82962 GLUCOSE BLOOD TEST: CPT

## 2022-05-05 PROCEDURE — 6360000002 HC RX W HCPCS: Performed by: ORTHOPAEDIC SURGERY

## 2022-05-05 PROCEDURE — 2580000003 HC RX 258: Performed by: ORTHOPAEDIC SURGERY

## 2022-05-05 PROCEDURE — 97164 PT RE-EVAL EST PLAN CARE: CPT

## 2022-05-05 PROCEDURE — 2580000003 HC RX 258: Performed by: INTERNAL MEDICINE

## 2022-05-05 PROCEDURE — 84145 PROCALCITONIN (PCT): CPT

## 2022-05-05 PROCEDURE — 2700000000 HC OXYGEN THERAPY PER DAY

## 2022-05-05 PROCEDURE — 94761 N-INVAS EAR/PLS OXIMETRY MLT: CPT

## 2022-05-05 PROCEDURE — 81001 URINALYSIS AUTO W/SCOPE: CPT

## 2022-05-05 RX ORDER — SODIUM CHLORIDE 9 MG/ML
INJECTION, SOLUTION INTRAVENOUS CONTINUOUS
Status: DISCONTINUED | OUTPATIENT
Start: 2022-05-05 | End: 2022-05-10

## 2022-05-05 RX ORDER — LEVOFLOXACIN 5 MG/ML
750 INJECTION, SOLUTION INTRAVENOUS
Status: DISCONTINUED | OUTPATIENT
Start: 2022-05-05 | End: 2022-05-08

## 2022-05-05 RX ADMIN — FERROUS SULFATE TAB 325 MG (65 MG ELEMENTAL FE) 325 MG: 325 (65 FE) TAB at 09:34

## 2022-05-05 RX ADMIN — HYDROMORPHONE HYDROCHLORIDE 0.5 MG: 1 INJECTION, SOLUTION INTRAMUSCULAR; INTRAVENOUS; SUBCUTANEOUS at 04:13

## 2022-05-05 RX ADMIN — SERTRALINE HYDROCHLORIDE 25 MG: 50 TABLET ORAL at 22:27

## 2022-05-05 RX ADMIN — PANTOPRAZOLE 20 MG: 20 TABLET, DELAYED RELEASE ORAL at 09:35

## 2022-05-05 RX ADMIN — OXYCODONE HYDROCHLORIDE 5 MG: 5 TABLET ORAL at 22:26

## 2022-05-05 RX ADMIN — SODIUM CHLORIDE, PRESERVATIVE FREE 10 ML: 5 INJECTION INTRAVENOUS at 09:35

## 2022-05-05 RX ADMIN — INSULIN LISPRO 2 UNITS: 100 INJECTION, SOLUTION INTRAVENOUS; SUBCUTANEOUS at 18:41

## 2022-05-05 RX ADMIN — AMLODIPINE BESYLATE 10 MG: 10 TABLET ORAL at 09:34

## 2022-05-05 RX ADMIN — LEVOFLOXACIN 750 MG: 5 INJECTION, SOLUTION INTRAVENOUS at 14:41

## 2022-05-05 RX ADMIN — DONEPEZIL HYDROCHLORIDE 5 MG: 5 TABLET, FILM COATED ORAL at 22:27

## 2022-05-05 RX ADMIN — ATORVASTATIN CALCIUM 10 MG: 10 TABLET, FILM COATED ORAL at 09:35

## 2022-05-05 RX ADMIN — SODIUM CHLORIDE: 9 INJECTION, SOLUTION INTRAVENOUS at 14:38

## 2022-05-05 RX ADMIN — ENOXAPARIN SODIUM 30 MG: 100 INJECTION SUBCUTANEOUS at 09:36

## 2022-05-05 RX ADMIN — Medication 400 UNITS: at 09:37

## 2022-05-05 RX ADMIN — OXYCODONE HYDROCHLORIDE 5 MG: 5 TABLET ORAL at 14:30

## 2022-05-05 RX ADMIN — Medication 1000 UNITS: at 09:35

## 2022-05-05 ASSESSMENT — PAIN DESCRIPTION - PAIN TYPE: TYPE: SURGICAL PAIN

## 2022-05-05 ASSESSMENT — PAIN - FUNCTIONAL ASSESSMENT: PAIN_FUNCTIONAL_ASSESSMENT: PREVENTS OR INTERFERES SOME ACTIVE ACTIVITIES AND ADLS

## 2022-05-05 ASSESSMENT — PAIN SCALES - GENERAL: PAINLEVEL_OUTOF10: 7

## 2022-05-05 ASSESSMENT — PAIN SCALES - WONG BAKER: WONGBAKER_NUMERICALRESPONSE: 6

## 2022-05-05 ASSESSMENT — PAIN DESCRIPTION - ORIENTATION: ORIENTATION: LEFT

## 2022-05-05 ASSESSMENT — PAIN DESCRIPTION - LOCATION: LOCATION: HIP

## 2022-05-05 ASSESSMENT — PAIN DESCRIPTION - DESCRIPTORS: DESCRIPTORS: DISCOMFORT

## 2022-05-05 NOTE — PLAN OF CARE
Problem: Pain  Goal: Verbalizes/displays adequate comfort level or baseline comfort level  Outcome: Progressing     Problem: Safety - Adult  Goal: Free from fall injury  Outcome: Progressing     Problem: Skin/Tissue Integrity  Goal: Absence of new skin breakdown  Description: 1. Monitor for areas of redness and/or skin breakdown  2. Assess vascular access sites hourly  3. Every 4-6 hours minimum:  Change oxygen saturation probe site  4. Every 4-6 hours:  If on nasal continuous positive airway pressure, respiratory therapy assess nares and determine need for appliance change or resting period.   Outcome: Progressing

## 2022-05-05 NOTE — FLOWSHEET NOTE
Occupational Therapy Treatment Note  Name: Last Rodas MRN: [id number] :   1935   Date:  2022   Admission Date: 2022 Room:  [address] [place] [address]     Primary Problem:  Closed left hip fracture    Restrictions/Precautions:  Restrictions/Precautions  Restrictions/Precautions: Fall Risk,General Precautions,Weight Bearing   Lower Extremity Weight Bearing Restrictions  Left Lower Extremity Weight Bearing: Weight Bearing As Tolerated Upper Extremity Weight Bearing Restrictions  Other: 4 L of nc O2 in place on eval     Communication with other providers:  RN approved session    Subjective:  Patient states:  tangential speech  Pain: verbal indicator of pain present. (location, type, intensity)    Objective:    Observation:  Pt in bed supine eyes closed with xray present. Objective Measures:  Not oriented    Treatment, including education:  Therapeutic Activity Training:   Therapeutic activity training was instructed today. Cues were given for safety, sequence, UE/LE placement, awareness, and balance. Activities performed today included bed mobility training, sup-sit. Pt supine in bed upon arrival and yelling out in pain and confusion throughout session. Pt completed supine to sit with DEP A x2 and noted to have increased frustration and agitation throughout. Pt seated edge of bed with DEP A x1-2 and noted heavy posterior lean. Pt returned supine with DEP A x 2 and positioned for offloading and comfort. Pt unable to be redirected and despite cues. Pt supine in bed with all needs met and call light in reach.     Assessment / Impression:    Patient's tolerance of treatment: poor  Adverse Reaction: yelling and agitation  Significant change in status and impact:  cognition  Barriers to improvement: cognition and pain    Plan for Next Session:    Continue OT POC and progress    Time in:  1517  Time out:  1332  Timed treatment minutes:  15  Total treatment time:  15    Previously filed items:  Social/Functional History  Additional Comments: disoriented, confused, answers changing during assessment ; states was ind w/ mobility w/o device prev, assist w/ ADL's home mgmt; from [address] [place] [address], will need further info clarified 2/2 impaired cog     Electronically signed by:    KIRBY Lovelace,   5/5/2022, 2:14 PM

## 2022-05-05 NOTE — PROGRESS NOTES
Speech Language Pathology  Levon Husbands  1935  [id number]      Attempted to see Levon Husbands for a bedside swallowing treatment and diet tolerance monitoring 05/05/22. Deferred assessment- pt was yelling out with repositioning and refused all offers of PO intake at this time. SLP will re-attempt.     Vicki Singh [address], [id number] [place] [address], 5/5/2022

## 2022-05-05 NOTE — PROGRESS NOTES
Physical Therapy    Physical Therapy Treatment Note  Name: Shana Walker MRN: [id number] :   1935   Date:  2022   Admission Date: 2022 Room:  [address] [place] [address]   Restrictions/Precautions:  Restrictions/Precautions  Restrictions/Precautions: Fall Risk,General Precautions,Weight Bearing   Lower Extremity Weight Bearing Restrictions  Left Lower Extremity Weight Bearing: Weight Bearing As Tolerated Upper Extremity Weight Bearing Restrictions  Other: 4 L of nc O2 in place on eval  Communication with other providers:  Co-tx w/ Liliana Moody  Subjective:  Patient states:  Please stop, I'm not in my right mind! Pain:   Location, Type, Intensity (0/10 to 10/10): Unable to rate numerically ; crying out in pain w/ any movement  Objective:    Observation:  Supine, crying out in pain as radiology obtains CXR, very confused. She is intermittently agitated, in inc pain, anticipating pain before movement is initiated; limited ability to meaningfully participate, was able to be redirected and attend to some cues POD 1. Tele, peripheral IV, bed alarm in place. Cognition: engaging in inappropriate conversation t/o, agitated and pulling at therapist hands, eyes closed t/o entire session despite intermittent eye opening, unable to command follow, consistently unsafe behaviors ; she demonstrates limited ability to minimally participate w/ noted medical status change prognosis will be limited unless cognition is addressed  ROM: unable to assess 2/2 cognitive deficits listed above  Strength: unable to assess 2/2 cognitive deficits listed above  Seated balance at EOB : poor + ; requires mod A to prevent retropulsion and maintain hip on EOB  See treatment for assist levels for functional mobility performance  Treatment, including education/measures:  Therapeutic Activity Training:   Therapeutic activity training was instructed today. Cues were given for safety, sequence, UE/LE placement, awareness, and balance.     Activities performed today included bed mobility training, sup-sit, sit-stand, SPT. Supine <-> sit: dependent  Seated balance: poor +  Sit <-> supine: dependent  Scooting: dependent w/ inc agitation grabbing and squeezing hands   Positioned for improved comfort and pressure relief ; left in bed, call light in reach, bed alarmed, all needs met    Assessment / Impression:    Pt is now POD 5 and been unable to meaningfully participate 2/2 recent status change. She is agitated, more confused, unable to follow commands at this time, crying out in pain w/ movement and in anticipation of movement; unable to assess strength, ROM, functional mobility 2/2 cognitive deficits. POC will be adjusted to reflect cognitive status change; prognosis will cont to be limited by cognition, will cont to follow to make adjustments accordingly. SNF trial remains most appropriate option at this time.     Patient's tolerance of treatment:  poor  Barriers to improvement:  Cognition; pain  Plan for Next Session:    Cont w/ est POC and DC plan (SNF)  Attempt to progress activity and inc participation    Time in:  1516  Time out:  1532  Timed treatment minutes:  16 (Re-Eval no tx)  Total time:  16    Previously filed items:  Social/Functional History  Additional Comments: disoriented, confused, answers changing during assessment ; states was ind w/ mobility w/o device prev, assist w/ ADL's home mgmt; from [address] [place] [address], will need further info clarified 2/2 impaired cog  Patient Goals   Patient goals : return home  Long Term Goals  Time Frame for Long term goals : 1 week  Long term goal 1: pt will complete bed mobility at mod A  Long term goal 2: pt will complete transfers at min A  Long term goal 3: pt will ambulate 25 ft using FWW at min A       Electronically signed by:    Eric Kenney PT, DPT  5/5/2022, 4:39 PM

## 2022-05-05 NOTE — PROGRESS NOTES
Hospitalist Progress Note      Name:  Levon Moore /Age/Sex: 1935  (81 y.o. female)   MRN & CSN:  [id number] & [id number] Admission Date/Time: 2022 11:37 AM   [facility] PCP: Tia Xavier [address] [place] [address] Day: 6  Discharge barrier/Reason for continued hospitalization: Resolution of BARRY and placement    Assessment and Plan:   Levon Moore is a 81 y.o.  female PMH significant for dementia, complete heart block status post pacemaker placement, chronic diastolic CHF, pulmonary hypertension, HTN, HLD and bilateral lower extremity edema who was admitted for close left hip fracture following a fall at home. Left intertrochanteric femur fracture:   -Sustained from a mechanical fall while ambulating without her walker  -Evaluated by orthopedics and status post left hip trochanteric nail    -Pain medication as needed  -Fall precautions  -PT/OT-Ortho ok with discharge with out pt follow-up in 4 weeks with repeat x-rays. States okay to DC staples in 2 weeks post op at [facility]  -Case management following for placement. Acute kidney injury:   -Noted no fluid running. Will restart normal saline at 75 cc and monitor.    -Holding home Cozaar until BARRY resolves. -Check bladder scans to rule out urinary retention as cause of BARRY. -Avoid nephrotoxic medications.  -Considered nephrology if any worsening given renal function was completely normal on admission. DM2: A1c of 6.0%. Blood sugars okay . -Continue current insulin regimen. Essential hypertension: BP mostly controlled but intermittently in the 140150/70 range  -Continue home antihypertensive regimen. As needed hydralazine. Moderate oral dysphagia with mild pharyngeal dysphagia:  Possible aspiration pneumonia given elevated procalcitonin and leukocytosis   -Speech therapy following. Diet per SLP recs    Leukocytosis  Unknown source  Since procalcitonin elevated at 0.4, will start on Levaquin for possible aspiration pneumonia/UTI   -Check chest x-ray/ urinalysis  -Monitor    Chronic diastolic CHF  Compensated  Careful with IV fluid    Anemia  Acute on chronic  Hemoglobin down from 10.5 to 8.5  Likely acute blood loss from surgery  Continue to monitor  Transfuse if less than 7    Other comorbidities:  History of CAD, GERD, dementia,  normocytic anemia.     Prophylaxis: SC lovenox  Code status: Full code      Diet ADULT DIET; Dysphagia - Pureed   DVT Prophylaxis [x] Lovenox, []  Heparin, [] SCDs, [] Ambulation   GI Prophylaxis [] PPI,  [] H2 Blocker,  [] Carafate,  [] Diet/Tube Feeds   Code Status Full Code   MDM [] Low, [x] Moderate,[]  High       History of Present Illness:     Chief Complaint: Closed left hip fracture, initial encounter [facility])     Patient evaluated at bedside and examined  Sleeping but responds verbally to some questions  Denies abdominal pain or chest pain  Noted dark urine  Somewhat confused    Objective: Intake/Output Summary (Last 24 hours) at 5/5/2022 1338  Last data filed at 5/5/2022 1152  Gross per 24 hour   Intake 50 ml   Output 700 ml   Net -650 ml        Vitals:   Vitals:    05/05/22 0325 05/05/22 0754 05/05/22 0930 05/05/22 1327   BP: 137/67  (!) 137/58 (!) 153/91   Pulse:   60    Resp: 22 18 20 20   Temp: 99 °F (37.2 °C)  97.9 °F (36.6 °C) 97.6 °F (36.4 °C)   TempSrc: Oral  Oral Rectal   SpO2: 95% 93% 94% 95%   Weight:       Height:            Physical Exam:   GEN: Patient is sleepy but verbally responsive to some questions. O2 nasal cannula at 4 L. Comfortable  HEENT: Dry mucous membranes. Refused to open eyes when asked to. RESP: Bilateral good air entry ,normal work of breathing. No wheezes/rhonchi/rales. CVS: RRR, S1, S2  GI/: Abdomen is soft, nontender, no organomegaly. . Bowel sounds normal  MSK/neuro: Decreased strength in the left lower extremity due to left femur fracture. Limited exam as patient is too sleepy to participate.   Extremities: No edema   SKIN: Normal coloration, warm, dry.     Medications:   Medications:    levofloxacin  750 mg IntraVENous Q48H    enoxaparin  30 mg SubCUTAneous Daily    atorvastatin  10 mg Oral Daily    budesonide-formoterol  2 puff Inhalation BID    donepezil  5 mg Oral Nightly    ferrous sulfate  325 mg Oral Every Other Day    pantoprazole  20 mg Oral Daily    sertraline  25 mg Oral Nightly    Vitamin D  1,000 Units Oral Daily    vitamin E  400 Units Oral Daily    sodium chloride flush  5-40 mL IntraVENous 2 times per day    amLODIPine  10 mg Oral Daily    insulin lispro  0-12 Units SubCUTAneous TID WC    insulin lispro  0-6 Units SubCUTAneous Nightly      Infusions:    sodium chloride      sodium chloride      dextrose       PRN Meds: oxyCODONE, 5 mg, Q4H PRN  hydrALAZINE (APRESOLINE) ivpb, 10 mg, Q6H PRN  HYDROmorphone, 0.25 mg, Q3H PRN   Or  HYDROmorphone, 0.5 mg, Q3H PRN  acetaminophen, 650 mg, Q4H PRN  bisacodyl, 5 mg, Daily PRN  HYDROcodone-acetaminophen, 1 tablet, Q4H PRN  acetaminophen, 650 mg, Q6H PRN   Or  acetaminophen, 650 mg, Q6H PRN  albuterol sulfate HFA, 2 puff, Q6H PRN  sodium chloride flush, 10 mL, PRN  sodium chloride, , PRN  ondansetron, 4 mg, Q8H PRN   Or  ondansetron, 4 mg, Q6H PRN  polyethylene glycol, 17 g, Daily PRN  dextrose, 12.5 g, PRN  glucagon (rDNA), 1 mg, PRN  dextrose, 100 mL/hr, PRN  glucose, 16 g, PRN        Electronically signed by Paxton Pena MD on 5/5/2022 at 1:38 PM

## 2022-05-05 NOTE — PLAN OF CARE
Problem: Discharge Planning  Goal: Discharge to home or other facility with appropriate resources  Outcome: Progressing     Problem: Pain  Goal: Verbalizes/displays adequate comfort level or baseline comfort level  5/5/2022 1325 by Aldo Koch LPN  Outcome: Progressing  5/5/2022 0618 by Jeffrey Lake RN  Outcome: Progressing     Problem: Chronic Conditions and Co-morbidities  Goal: Patient's chronic conditions and co-morbidity symptoms are monitored and maintained or improved  Outcome: Progressing     Problem: Safety - Adult  Goal: Free from fall injury  5/5/2022 1325 by Aldo Koch LPN  Outcome: Progressing  5/5/2022 0618 by Jeffrey Lake RN  Outcome: Progressing     Problem: Skin/Tissue Integrity  Goal: Absence of new skin breakdown  Description: 1. Monitor for areas of redness and/or skin breakdown  2. Assess vascular access sites hourly  3. Every 4-6 hours minimum:  Change oxygen saturation probe site  4. Every 4-6 hours:  If on nasal continuous positive airway pressure, respiratory therapy assess nares and determine need for appliance change or resting period.   5/5/2022 1325 by Aldo Koch LPN  Outcome: Progressing  5/5/2022 0618 by Jeffrey Lake RN  Outcome: Progressing     Problem: ABCDS Injury Assessment  Goal: Absence of physical injury  Outcome: Progressing

## 2022-05-05 NOTE — CARE COORDINATION
This RN CM spoke with Tracee from [place] and she is going to follow up with Edda Luna who has been covering her this past week for update on referral.

## 2022-05-05 NOTE — PROGRESS NOTES
Patient disorientated to place, person, time, self. This nurse was told in report this was baseline behavior. Patients son, Chloe Navarro, came in to check on the patient and said this is not baseline for her. The family is very concerned. This nurse sent a perfect serve message to provider.

## 2022-05-06 LAB
ANION GAP SERPL CALCULATED.3IONS-SCNC: 7 MMOL/L (ref 4–16)
BASOPHILS ABSOLUTE: 0 K/CU MM
BASOPHILS RELATIVE PERCENT: 0.3 % (ref 0–1)
BUN BLDV-MCNC: 63 MG/DL (ref 6–23)
CALCIUM SERPL-MCNC: 9.5 MG/DL (ref 8.3–10.6)
CHLORIDE BLD-SCNC: 106 MMOL/L (ref 99–110)
CO2: 26 MMOL/L (ref 21–32)
CREAT SERPL-MCNC: 1.2 MG/DL (ref 0.6–1.1)
DIFFERENTIAL TYPE: ABNORMAL
EOSINOPHILS ABSOLUTE: 0.1 K/CU MM
EOSINOPHILS RELATIVE PERCENT: 0.6 % (ref 0–3)
GFR AFRICAN AMERICAN: 52 ML/MIN/1.73M2
GFR NON-AFRICAN AMERICAN: 43 ML/MIN/1.73M2
GLUCOSE BLD-MCNC: 129 MG/DL (ref 70–99)
GLUCOSE BLD-MCNC: 134 MG/DL (ref 70–99)
GLUCOSE BLD-MCNC: 135 MG/DL (ref 70–99)
GLUCOSE BLD-MCNC: 140 MG/DL (ref 70–99)
GLUCOSE BLD-MCNC: 171 MG/DL (ref 70–99)
HCT VFR BLD CALC: 28.4 % (ref 37–47)
HEMOGLOBIN: 8.7 GM/DL (ref 12.5–16)
IMMATURE NEUTROPHIL %: 0.5 % (ref 0–0.43)
LYMPHOCYTES ABSOLUTE: 1.1 K/CU MM
LYMPHOCYTES RELATIVE PERCENT: 8.6 % (ref 24–44)
MCH RBC QN AUTO: 30.6 PG (ref 27–31)
MCHC RBC AUTO-ENTMCNC: 30.6 % (ref 32–36)
MCV RBC AUTO: 100 FL (ref 78–100)
MONOCYTES ABSOLUTE: 1.6 K/CU MM
MONOCYTES RELATIVE PERCENT: 12.9 % (ref 0–4)
NUCLEATED RBC %: 0 %
PDW BLD-RTO: 13.9 % (ref 11.7–14.9)
PLATELET # BLD: 231 K/CU MM (ref 140–440)
PMV BLD AUTO: 9.3 FL (ref 7.5–11.1)
POTASSIUM SERPL-SCNC: 4.2 MMOL/L (ref 3.5–5.1)
RBC # BLD: 2.84 M/CU MM (ref 4.2–5.4)
SEGMENTED NEUTROPHILS ABSOLUTE COUNT: 9.8 K/CU MM
SEGMENTED NEUTROPHILS RELATIVE PERCENT: 77.1 % (ref 36–66)
SODIUM BLD-SCNC: 139 MMOL/L (ref 135–145)
TOTAL IMMATURE NEUTOROPHIL: 0.06 K/CU MM
TOTAL NUCLEATED RBC: 0 K/CU MM
WBC # BLD: 12.7 K/CU MM (ref 4–10.5)

## 2022-05-06 PROCEDURE — 2580000003 HC RX 258: Performed by: ORTHOPAEDIC SURGERY

## 2022-05-06 PROCEDURE — 6370000000 HC RX 637 (ALT 250 FOR IP): Performed by: INTERNAL MEDICINE

## 2022-05-06 PROCEDURE — 1200000000 HC SEMI PRIVATE

## 2022-05-06 PROCEDURE — 82962 GLUCOSE BLOOD TEST: CPT

## 2022-05-06 PROCEDURE — 85025 COMPLETE CBC W/AUTO DIFF WBC: CPT

## 2022-05-06 PROCEDURE — 80048 BASIC METABOLIC PNL TOTAL CA: CPT

## 2022-05-06 PROCEDURE — 94761 N-INVAS EAR/PLS OXIMETRY MLT: CPT

## 2022-05-06 PROCEDURE — 2580000003 HC RX 258: Performed by: INTERNAL MEDICINE

## 2022-05-06 PROCEDURE — 6370000000 HC RX 637 (ALT 250 FOR IP): Performed by: ORTHOPAEDIC SURGERY

## 2022-05-06 PROCEDURE — 2700000000 HC OXYGEN THERAPY PER DAY

## 2022-05-06 PROCEDURE — 6360000002 HC RX W HCPCS: Performed by: ORTHOPAEDIC SURGERY

## 2022-05-06 PROCEDURE — 36415 COLL VENOUS BLD VENIPUNCTURE: CPT

## 2022-05-06 RX ORDER — QUETIAPINE FUMARATE 25 MG/1
25 TABLET, FILM COATED ORAL NIGHTLY
Status: DISCONTINUED | OUTPATIENT
Start: 2022-05-06 | End: 2022-05-14 | Stop reason: HOSPADM

## 2022-05-06 RX ORDER — HYDROCODONE BITARTRATE AND ACETAMINOPHEN 5; 325 MG/1; MG/1
1 TABLET ORAL EVERY 6 HOURS PRN
Status: DISCONTINUED | OUTPATIENT
Start: 2022-05-06 | End: 2022-05-14 | Stop reason: HOSPADM

## 2022-05-06 RX ADMIN — DONEPEZIL HYDROCHLORIDE 5 MG: 5 TABLET, FILM COATED ORAL at 21:58

## 2022-05-06 RX ADMIN — SODIUM CHLORIDE, PRESERVATIVE FREE 10 ML: 5 INJECTION INTRAVENOUS at 08:40

## 2022-05-06 RX ADMIN — Medication 400 UNITS: at 08:41

## 2022-05-06 RX ADMIN — HYDROCODONE BITARTRATE AND ACETAMINOPHEN 1 TABLET: 5; 325 TABLET ORAL at 22:14

## 2022-05-06 RX ADMIN — ENOXAPARIN SODIUM 30 MG: 100 INJECTION SUBCUTANEOUS at 08:40

## 2022-05-06 RX ADMIN — INSULIN LISPRO 2 UNITS: 100 INJECTION, SOLUTION INTRAVENOUS; SUBCUTANEOUS at 11:30

## 2022-05-06 RX ADMIN — INSULIN LISPRO 2 UNITS: 100 INJECTION, SOLUTION INTRAVENOUS; SUBCUTANEOUS at 17:03

## 2022-05-06 RX ADMIN — ATORVASTATIN CALCIUM 10 MG: 10 TABLET, FILM COATED ORAL at 08:41

## 2022-05-06 RX ADMIN — Medication 1000 UNITS: at 08:41

## 2022-05-06 RX ADMIN — AMLODIPINE BESYLATE 10 MG: 10 TABLET ORAL at 08:41

## 2022-05-06 RX ADMIN — QUETIAPINE FUMARATE 25 MG: 25 TABLET ORAL at 21:58

## 2022-05-06 RX ADMIN — PANTOPRAZOLE 20 MG: 20 TABLET, DELAYED RELEASE ORAL at 08:41

## 2022-05-06 RX ADMIN — SODIUM CHLORIDE: 9 INJECTION, SOLUTION INTRAVENOUS at 18:14

## 2022-05-06 ASSESSMENT — PAIN SCALES - GENERAL: PAINLEVEL_OUTOF10: 7

## 2022-05-06 ASSESSMENT — PAIN DESCRIPTION - DESCRIPTORS: DESCRIPTORS: ACHING;OTHER (COMMENT)

## 2022-05-06 NOTE — CARE COORDINATION
F/u with Tracee/wo[facility]. At this time they are declining to accept the Pt. Call to Pt POA/Son Luis Goss informed son that [facility] declined. LSW talked to the POA about Swing bed. Pt son choices are Swing bed then [facility]. LSW made a referral to Swing [facility].

## 2022-05-06 NOTE — PROGRESS NOTES
Hospitalist Progress Note      Name:  Silvia Douglas /Age/Sex: 1935  (81 y.o. female)   MRN & CSN:  [id number] & [id number] Admission Date/Time: 2022 11:37 AM   [facility] PCP: Lizabeth Molina [place] Day: 7  Discharge barrier/Reason for continued hospitalization: Resolution of BARRY and placement    Assessment and Plan:   Silvia Douglas is a 81 y.o.  female PMH significant for dementia, complete heart block status post pacemaker placement, chronic diastolic CHF, pulmonary hypertension, HTN, HLD and bilateral lower extremity edema who was admitted for close left hip fracture following a fall at home. Left intertrochanteric femur fracture:   -Sustained from a mechanical fall while ambulating without her walker  -Evaluated by orthopedics and status post left hip trochanteric nail    -Pain medication as needed  -Fall precautions  -PT/OT-Ortho ok with discharge with out pt follow-up in 4 weeks with repeat x-rays. States okay to DC staples in 2 weeks post op at [facility]  -Case management following for placement. Acute kidney injury:   -On normal saline at 75 cc and monitor.    -Creatinine down from 2.1-1.2  -Holding home Cozaar until BARRY resolves. -Urine looks clearer  -Avoid nephrotoxic medications.  -Encouraging Increased fluid intake    Acute metabolic encephalopathy  On top of baseline dementia  Likely secondary to acute kidney failure/dehydration and UTI  Slightly improved today  Continue IV fluid and antibiotics  DC'd IV Ativan  DC Roxicodone and continue as needed Norco  Avoid a lot of CNS depressant meds  Seroquel 25 mg nightly and monitor      DM2: A1c of 6.0%. Blood sugars okay . -Continue current insulin regimen. Essential hypertension: BP mostly controlled but intermittently in the 140150/70 range  -Continue home antihypertensive regimen. As needed hydralazine.     Moderate oral dysphagia with mild pharyngeal dysphagia:  Possible aspiration pneumonia given elevated procalcitonin and leukocytosis   -Speech therapy following. Diet per SLP recs-dysphagia puréed    Leukocytosis  Likely secondary to UTI  Slightly improved continue IV Levaquin. Follow urine culture  Chest x-ray noted with mild pulmonary vascular congestion  -Monitor    Chronic diastolic CHF  Compensated  Careful with IV fluid to avoid volume overload    Anemia  Acute on chronic  Hemoglobin down from 10.5 to 8.5  Now stable in the mid 8's. Likely acute blood loss from surgery  Continue to monitor  Transfuse if less than 7    Other comorbidities:  History of CAD, GERD, dementia,  normocytic anemia.     Prophylaxis: SC lovenox  Code status: Full code    Surrogate decision maker: Son    Disposition: SNF pending. Diet ADULT DIET; Dysphagia - Pureed   DVT Prophylaxis [x] Lovenox, []  Heparin, [] SCDs, [] Ambulation   GI Prophylaxis [] PPI,  [] H2 Blocker,  [] Carafate,  [] Diet/Tube Feeds   Code Status Full Code   MDM [] Low, [] Moderate,[x]  High       History of Present Illness:     Chief Complaint: Closed left hip fracture, initial encounter [facility])     Patient evaluated at bedside and examined  Awake and alert, eyes open. Immediately started begging me to give her some water to drink and drank about 8 ounces. Still confused and only oriented to person    Objective: Intake/Output Summary (Last 24 hours) at 5/6/2022 1638  Last data filed at 5/6/2022 0424  Gross per 24 hour   Intake    Output 500 ml   Net -500 ml      Vitals:   Vitals:    05/06/22 0424 05/06/22 0542 05/06/22 0830 05/06/22 1434   BP: 136/79  (!) 156/79 130/89   Pulse:   60 62   Resp:   16 22   Temp: 98 °F (36.7 °C)  98 °F (36.7 °C) 97.5 °F (36.4 °C)   TempSrc: Oral  Oral    SpO2:   95% 90%   Weight:  216 lb 11.4 oz (98.3 kg)     Height:            Physical Exam:   GEN: Patient awake and alert but confused, but in no acute distress,verbally responsive to some questions. O2 nasal cannula at 4 L.   Comfortable  HEENT: Dry mucous membranes. Refused to open eyes when asked to. RESP: Bilateral good air entry ,normal work of breathing. No wheezes/rhonchi/rales. CVS: RRR, S1, S2  GI/: Abdomen is soft, nontender, no organomegaly. . Bowel sounds normal  MSK/neuro: Decreased strength in the left lower extremity due to left femur fracture. Extremities: No edema   SKIN: Normal coloration, warm, dry.     Medications:   Medications:    levofloxacin  750 mg IntraVENous Q48H    enoxaparin  30 mg SubCUTAneous Daily    atorvastatin  10 mg Oral Daily    budesonide-formoterol  2 puff Inhalation BID    donepezil  5 mg Oral Nightly    ferrous sulfate  325 mg Oral Every Other Day    pantoprazole  20 mg Oral Daily    sertraline  25 mg Oral Nightly    Vitamin D  1,000 Units Oral Daily    vitamin E  400 Units Oral Daily    sodium chloride flush  5-40 mL IntraVENous 2 times per day    amLODIPine  10 mg Oral Daily    insulin lispro  0-12 Units SubCUTAneous TID WC    insulin lispro  0-6 Units SubCUTAneous Nightly      Infusions:    sodium chloride 75 mL/hr at 05/05/22 1438    sodium chloride      dextrose       PRN Meds: oxyCODONE, 5 mg, Q4H PRN  hydrALAZINE (APRESOLINE) ivpb, 10 mg, Q6H PRN  acetaminophen, 650 mg, Q4H PRN  bisacodyl, 5 mg, Daily PRN  HYDROcodone-acetaminophen, 1 tablet, Q4H PRN  acetaminophen, 650 mg, Q6H PRN   Or  acetaminophen, 650 mg, Q6H PRN  albuterol sulfate HFA, 2 puff, Q6H PRN  sodium chloride flush, 10 mL, PRN  sodium chloride, , PRN  ondansetron, 4 mg, Q8H PRN   Or  ondansetron, 4 mg, Q6H PRN  polyethylene glycol, 17 g, Daily PRN  dextrose, 12.5 g, PRN  glucagon (rDNA), 1 mg, PRN  dextrose, 100 mL/hr, PRN  glucose, 16 g, PRN        Electronically signed by Agueda Elizabeth MD on 5/6/2022 at 4:38 PM

## 2022-05-06 NOTE — PROGRESS NOTES
Nutrition Assessment     Type and Reason for Visit: Initial,RD Nutrition Re-Screen/LOS    Nutrition Recommendations/Plan:   1. Pt assessed due to a length of stay. Pt has been on a pureed diet and is consuming % of most of her meals. Pt does not have a wound and her weight appears stable. Pt is at low risk at this time.        Nidia Morales RD, LD  Contact: [phone]

## 2022-05-07 LAB
GLUCOSE BLD-MCNC: 102 MG/DL (ref 70–99)
GLUCOSE BLD-MCNC: 118 MG/DL (ref 70–99)
GLUCOSE BLD-MCNC: 166 MG/DL (ref 70–99)
GLUCOSE BLD-MCNC: 169 MG/DL (ref 70–99)

## 2022-05-07 PROCEDURE — 6370000000 HC RX 637 (ALT 250 FOR IP): Performed by: ORTHOPAEDIC SURGERY

## 2022-05-07 PROCEDURE — 2580000003 HC RX 258: Performed by: INTERNAL MEDICINE

## 2022-05-07 PROCEDURE — 6360000002 HC RX W HCPCS: Performed by: INTERNAL MEDICINE

## 2022-05-07 PROCEDURE — 2700000000 HC OXYGEN THERAPY PER DAY

## 2022-05-07 PROCEDURE — 2580000003 HC RX 258: Performed by: ORTHOPAEDIC SURGERY

## 2022-05-07 PROCEDURE — 84145 PROCALCITONIN (PCT): CPT

## 2022-05-07 PROCEDURE — 1200000000 HC SEMI PRIVATE

## 2022-05-07 PROCEDURE — 6370000000 HC RX 637 (ALT 250 FOR IP): Performed by: INTERNAL MEDICINE

## 2022-05-07 PROCEDURE — 6360000002 HC RX W HCPCS: Performed by: ORTHOPAEDIC SURGERY

## 2022-05-07 PROCEDURE — 82962 GLUCOSE BLOOD TEST: CPT

## 2022-05-07 PROCEDURE — 94761 N-INVAS EAR/PLS OXIMETRY MLT: CPT

## 2022-05-07 RX ORDER — HYDRALAZINE HYDROCHLORIDE 25 MG/1
25 TABLET, FILM COATED ORAL EVERY 8 HOURS SCHEDULED
Status: DISCONTINUED | OUTPATIENT
Start: 2022-05-07 | End: 2022-05-14 | Stop reason: HOSPADM

## 2022-05-07 RX ORDER — BUDESONIDE AND FORMOTEROL FUMARATE DIHYDRATE 160; 4.5 UG/1; UG/1
2 AEROSOL RESPIRATORY (INHALATION) 2 TIMES DAILY PRN
Status: DISCONTINUED | OUTPATIENT
Start: 2022-05-07 | End: 2022-05-14 | Stop reason: HOSPADM

## 2022-05-07 RX ORDER — HALOPERIDOL 5 MG/ML
5 INJECTION INTRAMUSCULAR ONCE
Status: DISCONTINUED | OUTPATIENT
Start: 2022-05-07 | End: 2022-05-10

## 2022-05-07 RX ORDER — HYDRALAZINE HYDROCHLORIDE 25 MG/1
25 TABLET, FILM COATED ORAL EVERY 12 HOURS SCHEDULED
Status: DISCONTINUED | OUTPATIENT
Start: 2022-05-07 | End: 2022-05-07

## 2022-05-07 RX ADMIN — Medication 1000 UNITS: at 09:23

## 2022-05-07 RX ADMIN — SODIUM CHLORIDE, PRESERVATIVE FREE 10 ML: 5 INJECTION INTRAVENOUS at 09:24

## 2022-05-07 RX ADMIN — HYDROCODONE BITARTRATE AND ACETAMINOPHEN 1 TABLET: 5; 325 TABLET ORAL at 09:22

## 2022-05-07 RX ADMIN — FERROUS SULFATE TAB 325 MG (65 MG ELEMENTAL FE) 325 MG: 325 (65 FE) TAB at 09:22

## 2022-05-07 RX ADMIN — INSULIN LISPRO 2 UNITS: 100 INJECTION, SOLUTION INTRAVENOUS; SUBCUTANEOUS at 17:05

## 2022-05-07 RX ADMIN — INSULIN LISPRO 2 UNITS: 100 INJECTION, SOLUTION INTRAVENOUS; SUBCUTANEOUS at 12:30

## 2022-05-07 RX ADMIN — LEVOFLOXACIN 750 MG: 5 INJECTION, SOLUTION INTRAVENOUS at 16:57

## 2022-05-07 RX ADMIN — QUETIAPINE FUMARATE 25 MG: 25 TABLET ORAL at 22:09

## 2022-05-07 RX ADMIN — HYDRALAZINE HYDROCHLORIDE 25 MG: 25 TABLET ORAL at 22:09

## 2022-05-07 RX ADMIN — Medication 400 UNITS: at 09:22

## 2022-05-07 RX ADMIN — PANTOPRAZOLE 20 MG: 20 TABLET, DELAYED RELEASE ORAL at 09:23

## 2022-05-07 RX ADMIN — DONEPEZIL HYDROCHLORIDE 5 MG: 5 TABLET, FILM COATED ORAL at 22:09

## 2022-05-07 RX ADMIN — ATORVASTATIN CALCIUM 10 MG: 10 TABLET, FILM COATED ORAL at 09:22

## 2022-05-07 RX ADMIN — SODIUM CHLORIDE: 9 INJECTION, SOLUTION INTRAVENOUS at 06:09

## 2022-05-07 RX ADMIN — ENOXAPARIN SODIUM 30 MG: 100 INJECTION SUBCUTANEOUS at 09:23

## 2022-05-07 RX ADMIN — SODIUM CHLORIDE, PRESERVATIVE FREE 10 ML: 5 INJECTION INTRAVENOUS at 22:09

## 2022-05-07 RX ADMIN — AMLODIPINE BESYLATE 10 MG: 10 TABLET ORAL at 09:23

## 2022-05-07 RX ADMIN — HYDRALAZINE HYDROCHLORIDE 25 MG: 25 TABLET, FILM COATED ORAL at 09:23

## 2022-05-07 ASSESSMENT — PAIN SCALES - GENERAL
PAINLEVEL_OUTOF10: 7
PAINLEVEL_OUTOF10: 7

## 2022-05-07 ASSESSMENT — PAIN SCALES - WONG BAKER
WONGBAKER_NUMERICALRESPONSE: 6

## 2022-05-07 ASSESSMENT — PAIN - FUNCTIONAL ASSESSMENT: PAIN_FUNCTIONAL_ASSESSMENT: PREVENTS OR INTERFERES WITH ALL ACTIVE AND SOME PASSIVE ACTIVITIES

## 2022-05-07 NOTE — PROGRESS NOTES
Hospitalist Progress Note      Name:  Radha Dyer /Age/Sex: 1935  (81 y.o. female)   MRN & CSN:  [id number] & [id number] Admission Date/Time: 2022 11:37 AM   Location:  1120/1120-A PCP: Lizabeth Baker [facility] Day: 8  Discharge barrier/Reason for continued hospitalization: Resolution of BARRY and placement    Assessment and Plan:   Radha Dyer is a 81 y.o.  female PMH significant for dementia, complete heart block status post pacemaker placement, chronic diastolic CHF, pulmonary hypertension, HTN, HLD and bilateral lower extremity edema who was admitted for close left hip fracture following a fall at home. She is status post left hip trochanteric nail . currently on treatment for BARRY, UTI and confusion. Case management following for placement . Left intertrochanteric femur fracture:   -Sustained from a mechanical fall while ambulating without her walker  -Orthopedics followed and she had left hip trochanteric nail  .   -Doing okay  -Continue pain medication as needed-Tylenol and Norco  -Continue PT/OT  -Ortho ok with discharge with out pt follow-up in 4 weeks with repeat x-rays. States okay to DC staples in 2 weeks post op at SNF ( 5/15/22)  -Case management following for placement. Acute kidney injury:   -On normal saline at 75 cc per hour . Will decrease to 50 cc an hour given diastolic CHF    -Improving,creatinine down from 2.1-1.2. Repeat lab in the morning  -Holding home Cozaar until BARRY resolves. -Urine looks clearer  -Avoid nephrotoxic medications.  -Encouraging Increased fluid intake/assist with feeding    Acute metabolic encephalopathy  Patient with baseline dementia  Likely secondary to acute kidney failure/dehydration and UTI  Slowly improving  Continue IV fluid and antibiotics  DC'd IV Ativan  DC Roxicodone and continue as needed Norco  Avoid CNS depressant meds  Continue Seroquel 25 mg nightly and monitor      DM2: A1c of 6.0%.   Blood sugars okay .  -Continue current insulin regimen.  -Not on any medication at home    Essential hypertension:   BP mostly controlled but intermittently with few elevated numbers. Continue Norvasc 10 mg daily  Increase hydralazine to 25 3 times daily   Monitor    Moderate oral dysphagia with mild pharyngeal dysphagia:  -Speech therapy following.  -Diet per SLP recs-dysphagia puréed    Leukocytosis  Likely secondary to UTI  Chest x-ray noted with mild pulmonary vascular congestion, no pneumonia  Continue IV Levaquin. Follow urine culture    -Monitor    Chronic diastolic CHF  Compensated  Careful with IV fluid to avoid volume overload    Anemia  Acute on chronic  Hemoglobin down from 10.5 to 8.5  Now stable in the mid 8's. Likely acute blood loss from surgery  Continue to monitor  Transfuse if less than 7    Other comorbidities:  History of CAD, GERD, dementia.     Prophylaxis: SC lovenox  Code status: Full code    Surrogate decision maker: Son    Disposition: SNF pending. Diet ADULT DIET; Dysphagia - Pureed   DVT Prophylaxis [x] Lovenox, []  Heparin, [] SCDs, [] Ambulation   GI Prophylaxis [] PPI,  [] H2 Blocker,  [] Carafate,  [] Diet/Tube Feeds   Code Status Full Code   MDM [] Low, [] Moderate,[x]  High       History of Present Illness:     Chief Complaint: Closed left hip fracture, initial encounter [facility])     Patient seen and evaluated at bedside this morning. Tech at bedside feeding her breakfast.  Ate about 50%. Awake and alert,more verbal but still with some confusion    Objective:        Intake/Output Summary (Last 24 hours) at 5/7/2022 1104  Last data filed at 5/7/2022 0311  Gross per 24 hour   Intake    Output 1350 ml   Net -1350 ml      Vitals:   Vitals:    05/07/22 0756 05/07/22 0915 05/07/22 0922 05/07/22 0952   BP:  (!) 158/64     Pulse: 60 61     Resp:  24 19 18   Temp:  98.2 °F (36.8 °C)     TempSrc:  Oral     SpO2:  97%     Weight:       Height:            Physical Exam:   GEN: Patient awake and alert but confused, In no acute distress,verbally responsive to some questions. O2 nasal cannula at 4 L. Comfortable  HEENT: Dry mucous membranes. Refused to open eyes when asked to. RESP: Bilateral good air entry ,normal work of breathing. No wheezes/rhonchi/rales. CVS: RRR, S1, S2  GI/: Abdomen is soft, nontender, no organomegaly. .Bowel sounds normal  MSK/neuro: Decreased strength in the left lower extremity due to left femur fracture. Extremities: No edema   SKIN: Normal coloration, warm, dry.     Medications:   Medications:    hydrALAZINE  25 mg Oral 3 times per day    QUEtiapine  25 mg Oral Nightly    levofloxacin  750 mg IntraVENous Q48H    enoxaparin  30 mg SubCUTAneous Daily    atorvastatin  10 mg Oral Daily    budesonide-formoterol  2 puff Inhalation BID    donepezil  5 mg Oral Nightly    ferrous sulfate  325 mg Oral Every Other Day    pantoprazole  20 mg Oral Daily    Vitamin D  1,000 Units Oral Daily    vitamin E  400 Units Oral Daily    sodium chloride flush  5-40 mL IntraVENous 2 times per day    amLODIPine  10 mg Oral Daily    insulin lispro  0-12 Units SubCUTAneous TID WC    insulin lispro  0-6 Units SubCUTAneous Nightly      Infusions:    sodium chloride 50 mL/hr at 05/07/22 0925    sodium chloride      dextrose       PRN Meds: HYDROcodone-acetaminophen, 1 tablet, Q6H PRN  hydrALAZINE (APRESOLINE) ivpb, 10 mg, Q6H PRN  acetaminophen, 650 mg, Q4H PRN  bisacodyl, 5 mg, Daily PRN  acetaminophen, 650 mg, Q6H PRN   Or  acetaminophen, 650 mg, Q6H PRN  albuterol sulfate HFA, 2 puff, Q6H PRN  sodium chloride flush, 10 mL, PRN  sodium chloride, , PRN  ondansetron, 4 mg, Q8H PRN   Or  ondansetron, 4 mg, Q6H PRN  polyethylene glycol, 17 g, Daily PRN  dextrose, 12.5 g, PRN  glucagon (rDNA), 1 mg, PRN  dextrose, 100 mL/hr, PRN  glucose, 16 g, PRN        Electronically signed by Gilma Howard MD on 5/7/2022 at 11:04 AM

## 2022-05-07 NOTE — FLOWSHEET NOTE
Physical Therapy Treatment Note  Name: Duane Nobles MRN: [id number] :   1935   Date:  2022   Admission Date: 2022 Room:  [address] [place] [address]   Restrictions/Precautions:  Restrictions/Precautions  Restrictions/Precautions: Fall Risk,General Precautions,Weight Bearing   Lower Extremity Weight Bearing Restrictions  Left Lower Extremity Weight Bearing: Weight Bearing As Tolerated Upper Extremity Weight Bearing Restrictions  Other: 4 L of nc O2 in place on eval    Communication with other providers:  PT ok per lexii Nunez)   Pt seen up in chair upon entering room. Pt initially stated \"I'm doing fine today sir. \" but when attempting to have patient participate in exercises, pt closed her eyes and not consistently following through with commands. Pt speaking out of context. Attempted ankle pumps and quad sets, and patient very inconsistent with repetitions. PT deferred at this time d/t patient's decreased initiation and cognition. Therapy will follow-up at later date when patient is cognitively appropriate and able to participate.      Time in:  1315  Time out:  1320  Timed treatment minutes: 5  Total treatment time: 5    Previously filed items:  Social/Functional History  Additional Comments: disoriented, confused, answers changing during assessment ; states was ind w/ mobility w/o device prev, assist w/ ADL's home mgmt; from [address] [place] [address], will need further info clarified 2/2 impaired cog  Patient Goals   Patient goals : return home  Long Term Goals  Time Frame for Long term goals : 1 week  Long term goal 1: pt will complete bed mobility at mod A  Long term goal 2: pt will complete transfers at min A  Long term goal 3: pt will ambulate 25 ft using FWW at min A       Electronically signed by:    ANNETTE [id number]  2022, 9:38 AM

## 2022-05-08 ENCOUNTER — APPOINTMENT (OUTPATIENT)
Dept: CT IMAGING | Age: 87
DRG: 480 | End: 2022-05-08
Attending: INTERNAL MEDICINE
Payer: MEDICARE

## 2022-05-08 LAB
ANION GAP SERPL CALCULATED.3IONS-SCNC: 8 MMOL/L (ref 4–16)
BUN BLDV-MCNC: 35 MG/DL (ref 6–23)
CALCIUM SERPL-MCNC: 10.2 MG/DL (ref 8.3–10.6)
CHLORIDE BLD-SCNC: 111 MMOL/L (ref 99–110)
CO2: 26 MMOL/L (ref 21–32)
CREAT SERPL-MCNC: 0.7 MG/DL (ref 0.6–1.1)
GFR AFRICAN AMERICAN: >60 ML/MIN/1.73M2
GFR NON-AFRICAN AMERICAN: >60 ML/MIN/1.73M2
GLUCOSE BLD-MCNC: 117 MG/DL (ref 70–99)
GLUCOSE BLD-MCNC: 122 MG/DL (ref 70–99)
GLUCOSE BLD-MCNC: 126 MG/DL (ref 70–99)
GLUCOSE BLD-MCNC: 133 MG/DL (ref 70–99)
GLUCOSE BLD-MCNC: 136 MG/DL (ref 70–99)
HCT VFR BLD CALC: 29.8 % (ref 37–47)
HEMOGLOBIN: 8.8 GM/DL (ref 12.5–16)
MCH RBC QN AUTO: 29.5 PG (ref 27–31)
MCHC RBC AUTO-ENTMCNC: 29.5 % (ref 32–36)
MCV RBC AUTO: 100 FL (ref 78–100)
PDW BLD-RTO: 14 % (ref 11.7–14.9)
PLATELET # BLD: 273 K/CU MM (ref 140–440)
PMV BLD AUTO: 9.4 FL (ref 7.5–11.1)
POTASSIUM SERPL-SCNC: 3.7 MMOL/L (ref 3.5–5.1)
PROCALCITONIN: 0.14
RBC # BLD: 2.98 M/CU MM (ref 4.2–5.4)
SODIUM BLD-SCNC: 145 MMOL/L (ref 135–145)
WBC # BLD: 11.5 K/CU MM (ref 4–10.5)

## 2022-05-08 PROCEDURE — 82962 GLUCOSE BLOOD TEST: CPT

## 2022-05-08 PROCEDURE — 84145 PROCALCITONIN (PCT): CPT

## 2022-05-08 PROCEDURE — 2700000000 HC OXYGEN THERAPY PER DAY

## 2022-05-08 PROCEDURE — 6370000000 HC RX 637 (ALT 250 FOR IP): Performed by: ORTHOPAEDIC SURGERY

## 2022-05-08 PROCEDURE — 87186 SC STD MICRODIL/AGAR DIL: CPT

## 2022-05-08 PROCEDURE — 36415 COLL VENOUS BLD VENIPUNCTURE: CPT

## 2022-05-08 PROCEDURE — 2580000003 HC RX 258: Performed by: ORTHOPAEDIC SURGERY

## 2022-05-08 PROCEDURE — 70450 CT HEAD/BRAIN W/O DYE: CPT

## 2022-05-08 PROCEDURE — 1200000000 HC SEMI PRIVATE

## 2022-05-08 PROCEDURE — 6360000002 HC RX W HCPCS: Performed by: ORTHOPAEDIC SURGERY

## 2022-05-08 PROCEDURE — 87088 URINE BACTERIA CULTURE: CPT

## 2022-05-08 PROCEDURE — 2580000003 HC RX 258: Performed by: INTERNAL MEDICINE

## 2022-05-08 PROCEDURE — 87086 URINE CULTURE/COLONY COUNT: CPT

## 2022-05-08 PROCEDURE — 94761 N-INVAS EAR/PLS OXIMETRY MLT: CPT

## 2022-05-08 PROCEDURE — 6360000002 HC RX W HCPCS: Performed by: INTERNAL MEDICINE

## 2022-05-08 PROCEDURE — 80048 BASIC METABOLIC PNL TOTAL CA: CPT

## 2022-05-08 PROCEDURE — 6370000000 HC RX 637 (ALT 250 FOR IP): Performed by: INTERNAL MEDICINE

## 2022-05-08 PROCEDURE — 85027 COMPLETE CBC AUTOMATED: CPT

## 2022-05-08 RX ORDER — LEVOFLOXACIN 5 MG/ML
500 INJECTION, SOLUTION INTRAVENOUS EVERY 24 HOURS
Status: DISCONTINUED | OUTPATIENT
Start: 2022-05-08 | End: 2022-05-10

## 2022-05-08 RX ADMIN — DONEPEZIL HYDROCHLORIDE 5 MG: 5 TABLET, FILM COATED ORAL at 21:20

## 2022-05-08 RX ADMIN — HYDROCODONE BITARTRATE AND ACETAMINOPHEN 1 TABLET: 5; 325 TABLET ORAL at 21:20

## 2022-05-08 RX ADMIN — ATORVASTATIN CALCIUM 10 MG: 10 TABLET, FILM COATED ORAL at 08:18

## 2022-05-08 RX ADMIN — HYDRALAZINE HYDROCHLORIDE 25 MG: 25 TABLET ORAL at 13:57

## 2022-05-08 RX ADMIN — AMLODIPINE BESYLATE 10 MG: 10 TABLET ORAL at 08:15

## 2022-05-08 RX ADMIN — Medication 400 UNITS: at 08:18

## 2022-05-08 RX ADMIN — SODIUM CHLORIDE, PRESERVATIVE FREE 10 ML: 5 INJECTION INTRAVENOUS at 21:21

## 2022-05-08 RX ADMIN — HYDRALAZINE HYDROCHLORIDE 25 MG: 25 TABLET ORAL at 06:52

## 2022-05-08 RX ADMIN — PANTOPRAZOLE 20 MG: 20 TABLET, DELAYED RELEASE ORAL at 08:16

## 2022-05-08 RX ADMIN — QUETIAPINE FUMARATE 25 MG: 25 TABLET ORAL at 21:21

## 2022-05-08 RX ADMIN — SODIUM CHLORIDE, PRESERVATIVE FREE 10 ML: 5 INJECTION INTRAVENOUS at 08:22

## 2022-05-08 RX ADMIN — HYDRALAZINE HYDROCHLORIDE 25 MG: 25 TABLET ORAL at 21:21

## 2022-05-08 RX ADMIN — SODIUM CHLORIDE: 9 INJECTION, SOLUTION INTRAVENOUS at 02:11

## 2022-05-08 RX ADMIN — ENOXAPARIN SODIUM 30 MG: 100 INJECTION SUBCUTANEOUS at 08:21

## 2022-05-08 RX ADMIN — Medication 1000 UNITS: at 08:17

## 2022-05-08 RX ADMIN — LEVOFLOXACIN 500 MG: 5 INJECTION, SOLUTION INTRAVENOUS at 14:01

## 2022-05-08 ASSESSMENT — PAIN SCALES - WONG BAKER
WONGBAKER_NUMERICALRESPONSE: 6
WONGBAKER_NUMERICALRESPONSE: 4
WONGBAKER_NUMERICALRESPONSE: 6

## 2022-05-08 ASSESSMENT — PAIN SCALES - GENERAL: PAINLEVEL_OUTOF10: 5

## 2022-05-08 NOTE — PROGRESS NOTES
Pt arrived fromn ICU, chest tube was connected to suction on wall by ICU RN. Pt is having leakage of bowel from around the rectal griffin. Pt was cleaned and incontinence care was provided. Pt educated on bed alarm and use of call light.   Call light within reach

## 2022-05-08 NOTE — PROGRESS NOTES
CT head noted  No acute abnormality  Continue to monitor  Consider neuro eval tomorrow if not improved    Javi Raygoza MD

## 2022-05-08 NOTE — PROGRESS NOTES
Notified by RN that patient is unable to hold her cup to drink water. States this is the first time she is taking care of patient. I was told patient has been confused for some days and has not been able to feed herself. She has also not been able to participate in physical therapy due to confusion. Has been confused since I started seeing her which seems to be improving with treating UTI and renal failure. Will get a stat CT head to evaluate. Patient has been able to move all extremities and push me away when I try to examine her the first 2 days. I did not notice any facial droop or focal deficits.     Charu Helm MD

## 2022-05-08 NOTE — PROGRESS NOTES
Hospitalist Progress Note      Name:  Bairon Mercer /Age/Sex: 1935  (81 y.o. female)   MRN & CSN:  [id number] & [id number] Admission Date/Time: 2022 11:37 AM   Location:  1120/1120-A PCP: [facility] [address] [place] [address] Day: 9  Discharge barrier/Reason for continued hospitalization: Resolution of BARRY and placement    Assessment and Plan:   Bairon Mercer is a 81 y.o.  female PMH significant for dementia, complete heart block status post pacemaker placement, chronic diastolic CHF, pulmonary hypertension, HTN, HLD and bilateral lower extremity edema who was admitted for close left hip fracture following a fall at home. She is status post left hip trochanteric nail . Currently on treatment for BARRY, UTI and confusion. Case management following for placement . Left intertrochanteric femur fracture:   -Sustained from a mechanical fall while ambulating without her walker  -Orthopedics followed and she had left hip trochanteric nail  .   -Doing well. Denies pain.  -Continue pain medication as needed-Tylenol and Norco  -Continue PT/OT  -Ortho ok with discharge with out pt follow-up in 4 weeks with repeat x-rays. States okay to DC staples in 2 weeks post op at SNF ( 5/15/22)  -Case management following for placement. Acute kidney injury:   -Sec to poor oral intake. -Resolved. Cr down from 2.1 to 0.7   -On normal saline at 50 cc per hour .  -Eating and drinking some but not adequate  -Holding home Cozaar.  -Avoid nephrotoxic medications.  -Encouraging Increased fluid intake/assist with feeding    Acute metabolic encephalopathy  Patient with baseline dementia  Likely secondary to acute kidney failure/dehydration and UTI  Slowly improving. Calm and verbal now. Oriented x 2. Continue IV fluid and antibiotics  DC'd IV Ativan  Avoid CNS depressant meds  Continue Seroquel 25 mg nightly and monitor    DM2: A1c of 6.0%. Blood sugars okay .   -Continue current insulin regimen.  -Not on any medication at home    Essential hypertension:   BP mostly controlled but intermittently with few elevated numbers. Continue Norvasc 10 mg daily  Continue hydralazine 25mg 3 times daily  Monitor    Moderate oral dysphagia with mild pharyngeal dysphagia:  -Speech therapy following.  -Diet per SLP recs-dysphagia puréed    Leukocytosis  UTI  Likely secondary to UTI  Chest x-ray noted with mild pulmonary vascular congestion, no pneumonia  Continue IV Levaquin-renally dosed before but will change to daily given lin resolved. .  Urine culture ordered but not done. Reorder.  -Monitor    Chronic diastolic CHF  Compensated  Careful with IV fluid to avoid volume overload    Anemia  Acute on chronic  Hemoglobin down from 10.5 to 8.5  Likely acute blood loss from surgery  Now stable at 8.8  Continue to monitor  Transfuse if less than 7    Other comorbidities: History of CAD, GERD, dementia.     Prophylaxis: SC lovenox  Code status: Full code    Surrogate decision maker: Son    Disposition: SNF pending. Diet ADULT DIET; Dysphagia - Pureed   DVT Prophylaxis [x] Lovenox, []  Heparin, [] SCDs, [] Ambulation   GI Prophylaxis [] PPI,  [] H2 Blocker,  [] Carafate,  [] Diet/Tube Feeds   Code Status Full Code   MDM [] Low, [] Moderate,[x]  High       History of Present Illness:     Chief Complaint: Closed left hip fracture, initial encounter [facility])     Patient seen and evaluated at bedside this morning. Awake and alert. No complaints. Calm. Knows she is in the hospital and was able to tell me her the names of her sons-[facility] and [facility]. Ate about 50%. Awake and alert,more verbal but not yet back to baseline. Called son and updated him. Objective:        Intake/Output Summary (Last 24 hours) at 5/8/2022 1141  Last data filed at 5/8/2022 8907  Gross per 24 hour   Intake 10 ml   Output 1200 ml   Net -1190 ml      Vitals:   Vitals:    05/07/22 2009 05/08/22 0220 05/08/22 0606 05/08/22 0815   BP: (!) 160/86 (!) 147/62  (!) 141/70   Pulse: 71 60  60   Resp: 20 18  20   Temp: 98.5 °F (36.9 °C) 96.7 °F (35.9 °C)  98.1 °F (36.7 °C)   TempSrc: Axillary Axillary  Oral   SpO2: 96%   100%   Weight:   216 lb 14.9 oz (98.4 kg)    Height:            Physical Exam:   GEN: Patient awake and alert ,but confused, In no acute distress,verbally responsive to some questions. O2 nasal cannula at 2 L. Comfortable  HEENT: Dry mucous membranes. Opens eyes  RESP: Bilateral good air entry ,normal work of breathing. No wheezes/rhonchi/rales. CVS: RRR, S1, S2  GI/: Abdomen is soft, nontender, no organomegaly. .Bowel sounds normal  MSK/neuro: Decreased strength in the left lower extremity due to left femur fracture. Dressing left lateral hip. No edema  Neuro: Awake alert oriented to person and place but not time. Moves all extremities. SKIN: Normal coloration, warm, dry.     Medications:   Medications:    levofloxacin  500 mg IntraVENous Q24H    hydrALAZINE  25 mg Oral 3 times per day    haloperidol lactate  5 mg IntraMUSCular Once    QUEtiapine  25 mg Oral Nightly    enoxaparin  30 mg SubCUTAneous Daily    atorvastatin  10 mg Oral Daily    donepezil  5 mg Oral Nightly    ferrous sulfate  325 mg Oral Every Other Day    pantoprazole  20 mg Oral Daily    Vitamin D  1,000 Units Oral Daily    vitamin E  400 Units Oral Daily    sodium chloride flush  5-40 mL IntraVENous 2 times per day    amLODIPine  10 mg Oral Daily    insulin lispro  0-12 Units SubCUTAneous TID WC    insulin lispro  0-6 Units SubCUTAneous Nightly      Infusions:    sodium chloride 50 mL/hr at 05/08/22 0211    sodium chloride      dextrose       PRN Meds: budesonide-formoterol, 2 puff, BID PRN  HYDROcodone-acetaminophen, 1 tablet, Q6H PRN  hydrALAZINE (APRESOLINE) ivpb, 10 mg, Q6H PRN  acetaminophen, 650 mg, Q4H PRN  bisacodyl, 5 mg, Daily PRN  acetaminophen, 650 mg, Q6H PRN   Or  acetaminophen, 650 mg, Q6H PRN  albuterol sulfate HFA, 2 puff, Q6H PRN  sodium chloride flush, 10 mL, PRN  sodium chloride, , PRN  ondansetron, 4 mg, Q8H PRN   Or  ondansetron, 4 mg, Q6H PRN  polyethylene glycol, 17 g, Daily PRN  dextrose, 12.5 g, PRN  glucagon (rDNA), 1 mg, PRN  dextrose, 100 mL/hr, PRN  glucose, 16 g, PRN        Electronically signed by Johnie Hudson MD on 5/8/2022 at 11:41 AM

## 2022-05-08 NOTE — PROGRESS NOTES
Mango Beatty is acting as primary nurse. Upon reassessment this nurse noticed pt was unable to coordinate hand to mouth movement and was unsteady with hand coordination. I spoke with Dr. Halie Betts and we agreed based on prior notes of pts condition prior to admission and after surgery there has been a change in mental status and coordination. So a stat CT of the head was ordered to rule out possible Stroke.

## 2022-05-09 LAB
GLUCOSE BLD-MCNC: 119 MG/DL (ref 70–99)
GLUCOSE BLD-MCNC: 123 MG/DL (ref 70–99)
GLUCOSE BLD-MCNC: 129 MG/DL (ref 70–99)
GLUCOSE BLD-MCNC: 143 MG/DL (ref 70–99)

## 2022-05-09 PROCEDURE — 94761 N-INVAS EAR/PLS OXIMETRY MLT: CPT

## 2022-05-09 PROCEDURE — 2580000003 HC RX 258: Performed by: INTERNAL MEDICINE

## 2022-05-09 PROCEDURE — 97110 THERAPEUTIC EXERCISES: CPT

## 2022-05-09 PROCEDURE — 6360000002 HC RX W HCPCS: Performed by: INTERNAL MEDICINE

## 2022-05-09 PROCEDURE — 1200000000 HC SEMI PRIVATE

## 2022-05-09 PROCEDURE — 82962 GLUCOSE BLOOD TEST: CPT

## 2022-05-09 PROCEDURE — 2700000000 HC OXYGEN THERAPY PER DAY

## 2022-05-09 PROCEDURE — 97530 THERAPEUTIC ACTIVITIES: CPT

## 2022-05-09 PROCEDURE — 92526 ORAL FUNCTION THERAPY: CPT | Performed by: SPEECH-LANGUAGE PATHOLOGIST

## 2022-05-09 PROCEDURE — 6370000000 HC RX 637 (ALT 250 FOR IP): Performed by: ORTHOPAEDIC SURGERY

## 2022-05-09 PROCEDURE — 6370000000 HC RX 637 (ALT 250 FOR IP): Performed by: INTERNAL MEDICINE

## 2022-05-09 PROCEDURE — 6360000002 HC RX W HCPCS: Performed by: ORTHOPAEDIC SURGERY

## 2022-05-09 PROCEDURE — 2580000003 HC RX 258: Performed by: ORTHOPAEDIC SURGERY

## 2022-05-09 RX ADMIN — HYDRALAZINE HYDROCHLORIDE 25 MG: 25 TABLET ORAL at 22:18

## 2022-05-09 RX ADMIN — LEVOFLOXACIN 500 MG: 5 INJECTION, SOLUTION INTRAVENOUS at 14:26

## 2022-05-09 RX ADMIN — Medication 1000 UNITS: at 07:53

## 2022-05-09 RX ADMIN — HYDROCODONE BITARTRATE AND ACETAMINOPHEN 1 TABLET: 5; 325 TABLET ORAL at 22:18

## 2022-05-09 RX ADMIN — INSULIN LISPRO 2 UNITS: 100 INJECTION, SOLUTION INTRAVENOUS; SUBCUTANEOUS at 07:34

## 2022-05-09 RX ADMIN — HYDRALAZINE HYDROCHLORIDE 25 MG: 25 TABLET ORAL at 14:24

## 2022-05-09 RX ADMIN — DONEPEZIL HYDROCHLORIDE 5 MG: 5 TABLET, FILM COATED ORAL at 22:18

## 2022-05-09 RX ADMIN — ATORVASTATIN CALCIUM 10 MG: 10 TABLET, FILM COATED ORAL at 07:53

## 2022-05-09 RX ADMIN — AMLODIPINE BESYLATE 10 MG: 10 TABLET ORAL at 07:53

## 2022-05-09 RX ADMIN — HYDRALAZINE HYDROCHLORIDE 25 MG: 25 TABLET ORAL at 05:39

## 2022-05-09 RX ADMIN — SODIUM CHLORIDE, PRESERVATIVE FREE 10 ML: 5 INJECTION INTRAVENOUS at 22:15

## 2022-05-09 RX ADMIN — SODIUM CHLORIDE: 9 INJECTION, SOLUTION INTRAVENOUS at 22:28

## 2022-05-09 RX ADMIN — ENOXAPARIN SODIUM 30 MG: 100 INJECTION SUBCUTANEOUS at 07:53

## 2022-05-09 RX ADMIN — FERROUS SULFATE TAB 325 MG (65 MG ELEMENTAL FE) 325 MG: 325 (65 FE) TAB at 07:54

## 2022-05-09 RX ADMIN — Medication 400 UNITS: at 07:53

## 2022-05-09 RX ADMIN — QUETIAPINE FUMARATE 25 MG: 25 TABLET ORAL at 22:18

## 2022-05-09 RX ADMIN — PANTOPRAZOLE 20 MG: 20 TABLET, DELAYED RELEASE ORAL at 07:53

## 2022-05-09 ASSESSMENT — PAIN SCALES - GENERAL
PAINLEVEL_OUTOF10: 7
PAINLEVEL_OUTOF10: 7

## 2022-05-09 ASSESSMENT — PAIN SCALES - WONG BAKER
WONGBAKER_NUMERICALRESPONSE: 4
WONGBAKER_NUMERICALRESPONSE: 4

## 2022-05-09 ASSESSMENT — PAIN DESCRIPTION - LOCATION: LOCATION: HIP

## 2022-05-09 ASSESSMENT — PAIN DESCRIPTION - ORIENTATION: ORIENTATION: LEFT

## 2022-05-09 NOTE — PROGRESS NOTES
Occupational Therapy  . Occupational Therapy Treatment Note  Name: Selena Cisse MRN: [id number] :   1935   Date:  2022   Admission Date: 2022 Room:  [address] [place] [address]     Discharge Recommendation: Victor M Rosas    Primary Problem:      Restrictions/Precautions:  Restrictions/Precautions  Restrictions/Precautions: Fall Risk,General Precautions,Weight Bearing   Lower Extremity Weight Bearing Restrictions  Left Lower Extremity Weight Bearing: Weight Bearing As Tolerated Upper Extremity Weight Bearing Restrictions  Other: 4 L of nc O2 in place on eval     Communication with other providers:  cotx with PTA Tara Orta for assist and safety    Subjective:  Patient states:  Call chel, I wont ever drive again. Pain: no numerical rating but cried out during all movement. (location, type, intensity)    Objective:    Observation:  Patient in semi fowlers. Sitter present. patient very confused, needs redirection for all tasks and questions. Very anxious throughout. Objective Measures:  , tele    Treatment, including education:    ADL activity training was instructed today. Cues were given for safety, sequence, UE/LE placement, visual cues, and balance. Activities performed today included  and grooming. Facial hygiene- Hand over hand initially, progressed to CGA    Therapeutic activity training was instructed today. Cues were given for safety, sequence, UE/LE placement, awareness, and balance. Activities performed today included bed mobility training, sup-sit, sit-stand, SPT.    supine to EOB via log roll- Max x2  Scooting hips forward- Max A plus Max A for trunk support. Sitting balance- Mod- Max A throughout. patient with noted retro lean needing cues cor correction. patient very anxious of falling. While EOB patient participated in balance exercises with PTA holding onto BUEs for trunk balance and scapular retraction, participated in PROM-shoulder ABB/ADD, shoulder flex/ext. patient tolerated x10 min before returning supine. Return supine- Max x2  Boost to [employer]- Max x2  Rolling- Max x2    Patient educated on role of OT , benefits of OT and rationale for therapeutic intervention. All therapeutic intervention performed c emphasis on trunk strengthening, bed mobility and supine<>sit to maintain / increase strength for functional tasks / transfers. Patient left safely in bed at end of session, with call light in reach, alarm on and nursing aware. Gait belt was used for func transfers / mobility. Assessment / Impression:    Patient may require morning tx. patient very confused at this time, redirection to task and questions needed. patient needs max encouragement to participate.    Patient's tolerance of treatment: fair  Adverse Reaction: none  Significant change in status and impact:  none  Barriers to improvement: weakness, pain, cognition    Plan for Next Session:    Continue with OT POC    Time in:  1533  Time out:  1604  Timed treatment minutes:  32  Total treatment time:  32    Previously filed items:  Social/Functional History  Additional Comments: disoriented, confused, answers changing during assessment ; states was ind w/ mobility w/o device prev, assist w/ ADL's home mgmt; from [address] [place] [address], will need further info clarified 2/2 impaired cog             Electronically signed by:    PIPPA Valdovinos [address], MICHEL/L 8416    5/9/2022, 4:08 PM

## 2022-05-09 NOTE — PROGRESS NOTES
Speech Language Pathology  [facility]  DEPARTMENT OF SPEECH/LANGUAGE PATHOLOGY  DAILY PROGRESS NOTE  Violeta Aviles  5/9/2022  [id number]  Closed left hip fracture, initial encounter ([facility] [address] [S72.002A]  Fracture [T14. 8XXA]  Allergies   Allergen Reactions    Lisinopril     Pcn [Penicillins]     Sulfa Antibiotics          Pt was seen this date for dysphagia treatment. IMPRESSION AND RECOMMENDATIONS:   Pt seen for diet tolerance monitoring and trials for advancement. Pt was alert and confused, asking about her family, possibly c/w her baseline due to h/o dementia. Nsg reports pt has been tolerating pureed solids and thins without observed difficulty. Pt was agreeable to trials of thin liquids by continuous straw sips and regular solids which she completed with normal bilabial closure/bolus containment and mildly prolonged mastication for regular solids for which she required liquid bolus to clear. Laryngeal excursion and swallow timing appeared to be WNL and pt demonstrated 0 s/s aspiration for all textures. Recommend:  Advance diet to Easy to Chew, continue Thins. Assist with meals, encourage intake. No further needs identified at this time.        GOALS (current status in bold):  Short-term Goals  Timeframe for Short-term Goals: LOS or until goals are met  Goal 1: Pt will tolerate pureed solids/thin liquids without clinical evidence of aspiration 100% Met, Discontinue  Goal 2: Pt will participate in advanced trials for possible safe diet level advancement 10/10 trials Met, Discontinue  Goal 3: Pt/caregivers will demonstrate comprehension of recommendations/POC Met, Discontinue     EDUCATION:  Diet level advancement with nsg    PAIN RATING (0-10 Scale): did not c/o pain  Time in/Time out: SLP Individual Minutes  Time In: 1120  Time Out: [address]  Minutes: 20    Visit number: More Bradley 55, SLP  5/9/2022  11:36 AM

## 2022-05-09 NOTE — PROGRESS NOTES
Physical Therapy  Name: Duane Nobles MRN: [id number] :   1935   Date:  2022   Admission Date: 2022 Room:  [address] [place] [address]   Restrictions/Precautions:  Restrictions/Precautions  Restrictions/Precautions: Fall Risk,General Precautions,Weight Bearing Lower Extremity Weight Bearing Restrictions  Left Lower Extremity Weight Bearing: Weight Bearing As Tolerated Upper Extremity Weight Bearing Restrictions  Other: 4 L of nc O2 in place on eval   Communication with other providers:  Cotx with Eleazar Bowen for patient safety and tolerance  Subjective:  Patient states:  \"I can't get out of this bed, and I feel like I'm gonna die here\" patient states this upon entry of therapists  Pain:   Location, Type, Intensity (0/10 to 10/10): Unable to quantify, LLE  Objective:    Observation:  Patient is Supine in Bed, NC O2 2L; patient attempts to doff d/t nasal soreness and discomfort  Treatment, including education/measures:  Transfers with line management of Tele Monitor, NC O2  Rolling: Mod A x2 to the Rt, partial roll Max-Dep x2 to the Lt. Max cues for patient to not resist movement d/t hip pain  Supine to sit : Max A - Dep x2, max cues for sequence and encouragement. Manual assistance for reaching and initiating. Max A Dep A with BLE managment   Seated balance: Fair- balance at Mod-Max A for steadying and tolerates ~10' before being overwhelmed by fatigue. Patient required BUE support into bed. Patient fearful if not supported by therapist and patient would increase her retro lean  Sit to supine :Dep x2 with cues for sequence   Scooting :Mod-Max A with seated scooting. Dep x2 supine scooting to the [facility].      Sitting Exercises: Added time for encouragement and redirection to stay on task  Ankle pumps x 10 with minimal ROM, AAROM  Marching x Mod-Max A with minimal ROM    Seated rows with min-mod A HHA for added effort  Seated overhead shoulder flexion, Min-Mod A HHA for added effort  Therapeutic Exercise:  Therapeutic exercises were instructed today. Cues were given for technique, safety, recruitment, and rationale. Cues were verbal and/or tactile. Safety  Patient left safely in the bed, with call light/phone in reach with alarm applied. Gait belt and mask were used for transfers and gait. Assessment / Impression:     Patient's tolerance of treatment:  Fair   Adverse Reaction: none  Significant change in status and impact:  Decrease resistance to activities and increase participation  Barriers to improvement:  cognition  Plan for Next Session:    Will cont to work towards pt's goals per patient tolerance  Time in:  [id number]  Time out:  1604  Timed treatment minutes:  31  Total treatment time:  31  Previously filed items:  Social/Functional History  Additional Comments: disoriented, confused, answers changing during assessment ; states was ind w/ mobility w/o device prev, assist w/ ADL's home mgmt; from [address] [place] [address], will need further info clarified 2/2 impaired cog     Long Term Goals  Time Frame for Long term goals : 1 week  Long term goal 1: pt will complete bed mobility at mod A  Long term goal 2: pt will complete transfers at min A  Long term goal 3: pt will ambulate 25 ft using FWW at min A  Electronically signed by:     Jyotsna Martinez, LUIS   5/9/2022, 4:06 PM

## 2022-05-09 NOTE — DISCHARGE INSTR - COC
Continuity of Care Form    Patient Name: Renetta Menezes   :  1935  MRN:  [id number]    Admit date:  2022  Discharge date:  ***    Code Status Order: Full Code   Advance Directives:   [facility] Documentation       Date/Time Healthcare Directive Type of Healthcare Directive Copy in [facility] [address] Agent's Name Healthcare Agent's Phone Number    [phone] No, patient does not have an advance directive for healthcare treatment -- -- -- -- --            Admitting Physician:  Charo Latif MD  PCP: VINNY Mandel    Discharging Nurse: [facility] Unit/Room#: 1120/1120-A  Discharging Unit Phone Number: ***    Emergency Contact:   Extended Emergency Contact Information  Primary Emergency Contact: [address] Phone: [phone]  Relation: Child   needed? No  [facility] Phone: [phone]  Relation: Child   needed?  No    Past Surgical History:  Past Surgical History:   Procedure Laterality Date    APPENDECTOMY      FEMUR FRACTURE SURGERY Left 2022    FEMUR IM NAIL SAWYER INSERTION performed by Ej Marroquin MD at [address] [facility] sequential pacer    PACEMAKER INSERTION N/A 2020    PACEMAKER INSERTION PERMANENT performed by Yola Andino MD at [facility]  2020    AV sequential       Immunization History:   Immunization History   Administered Date(s) Administered    COVID-19, Pfizer Purple top, DILUTE for use, 12+ yrs, 30mcg/0.3mL dose 2021, 2021       Active Problems:  Patient Active Problem List   Diagnosis Code    Complete heart block (HCC) I44.2    Cardiac pacemaker in situ Z95.0    Edema of lower extremity R60.0    Essential hypertension I10    Dyslipidemia E78.5    Other heart failure (HCC) I50.89    Pulmonary HTN (HCC) I27.20    Chronic diastolic heart failure (HCC) I50.32    Closed left hip fracture, initial encounter [facility]) S72.002A       Isolation/Infection:   Isolation            No Isolation          Patient Infection Status       Infection Onset Added Last Indicated Last Indicated By Review Planned Expiration Resolved Resolved By    None active    Resolved    COVID-19 (Rule Out) 12/25/20 12/25/20 12/25/20 COVID-19 (Ordered)   12/25/20 Rule-Out Test Resulted            Nurse Assessment:  Last Vital Signs: /61   Pulse 60   Temp 97.5 °F (36.4 °C) (Oral)   Resp 18   Ht 5' 6\" (1.676 m)   Wt 216 lb 14.9 oz (98.4 kg)   SpO2 99%   PF (!) 24 L/min   BMI 35.01 kg/m²     Last documented pain score (0-10 scale): Pain Level: 7  Last Weight:   Wt Readings from Last 1 Encounters:   05/08/22 216 lb 14.9 oz (98.4 kg)     Mental Status:  disoriented    IV Access:  - None    Nursing Mobility/ADLs:  Walking   Dependent  Transfer  Dependent  Bathing  Dependent  Dressing  Dependent  Toileting  Dependent  Feeding  Assisted  Med Admin  Assisted  Med Delivery   whole    Wound Care Documentation and Therapy:  Incision 05/01/22 Thigh Left;Lateral (Active)   Dressing Status Clean;Dry; Intact 05/09/22 0747   Dressing/Treatment Other (comment) 05/09/22 0747   Closure Adhesive bandage;Staples 05/09/22 0747   Drainage Amount None 05/09/22 0747   Number of days: 8        Elimination:  Continence: Bowel: yes  Bladder: Yes  Urinary Catheter: None   Colostomy/Ileostomy/Ileal Conduit: {YES / [id number]}       Date of Last BM: [id number]    Intake/Output Summary (Last 24 hours) at 5/9/2022 1013  Last data filed at 5/9/2022 0605  Gross per 24 hour   Intake --   Output 1020 ml   Net -1020 ml     I/O last 3 completed shifts:   In: 10 [I.V.:10]  Out: 2220 [Urine:2220]    Safety Concerns:     History of Falls (last 30 days)    Impairments/Disabilities:      None    Patient's personal belongings (please select all that are sent with patient):  None    RN SIGNATURE:  Electronically signed by Ewa Milton RN on 5/14/22 at 3:22 PM EDT    CASE MANAGEMENT/SOCIAL WORK SECTION    Inpatient Status Date: ***    Readmission Risk Assessment Score:  Readmission Risk              Risk of Unplanned Readmission:  18           Discharging to Facility/ Agency   Name:   Address:  Phone:  Fax:    Dialysis Facility (if applicable)   Name:  Address:  Dialysis Schedule:  Phone:  Fax:    / signature: {Esignature:[id number]}    PHYSICIAN SECTION    Nutrition Therapy:  Current Nutrition Therapy:   [address] SOL Diet RGLF:[id number]     Routes of Feeding: Oral  Liquids: No Restrictions  Daily Fluid Restriction: no  Last Modified Barium Swallow with Video (Video Swallowing Test): not done    Treatments at the Time of Hospital Discharge:   Respiratory Treatments: NA  Oxygen Therapy:  is not on home oxygen therapy. Ventilator:    - No ventilator support    Rehab Therapies: Physical Therapy and Occupational Therapy  Weight Bearing Status/Restrictions: Other Medical Equipment (for information only, NOT a DME order):  walker  Other Treatments: NA      Prognosis: Fair    Condition at Discharge: Stable    Rehab Potential (if transferring to Rehab): Fair    Recommended Labs or Other Treatments After Discharge: NA    Physician Certification: I certify the above information and transfer of Duane Del Angel  is necessary for the continuing treatment of the diagnosis listed and that she requires [facility] for less 30 days.      Update Admission H&P: No change in H&P    PHYSICIAN SIGNATURE:  Electronically signed by Miguel Angel Saldivar MD on 5/12/22 at 10:10 AM EDT

## 2022-05-09 NOTE — CARE COORDINATION
ONDINAW received a [employer] teams message that Pt was denied for the swing bed. Call to [facility] [place] with the referral as this is the Pt 2nd choice.

## 2022-05-09 NOTE — PROGRESS NOTES
Hospitalist Progress Note      Name:  Anjum Shook /Age/Sex: 1935  (81 y.o. female)   MRN & CSN:  [id number] & [id number] Admission Date/Time: 2022 11:37 AM   Location:  1120/1120-A PCP: Sita Richmond [address] [place] [address] Day: 10  Discharge barrier/Reason for continued hospitalization: Resolution of BARRY and placement    Assessment and Plan:   Anjum Shook is a 81 y.o.  female PMH significant for dementia, complete heart block status post pacemaker placement, chronic diastolic CHF, pulmonary hypertension, HTN, HLD and bilateral lower extremity edema who was admitted for close left hip fracture following a fall at home. She is status post left hip trochanteric nail . Currently on treatment for UTI and confusion. Case management following for placement . Left intertrochanteric femur fracture:   -Sustained from a mechanical fall while ambulating without her walker  -Orthopedics followed and she had left hip trochanteric nail  .   -Doing well. Denies pain.  -Continue pain medication as needed-Tylenol and Norco  -Continue PT/OT/ST  -Ortho ok with discharge with out pt follow-up in 4 weeks with repeat x-rays. States okay to DC staples in 2 weeks post op at SNF ( 5/15/22)  -Case management following for placement. Acute kidney injury:   -Sec to poor oral intake. -Resolved. Cr down from 2.1 to 0.7   -On normal saline at 50 cc per hour . Can DC once able to maintain adequate intake  -Eating and drinking some but not adequate  -Holding home Cozaar.  -Avoid nephrotoxic medications.  -Encouraging Increased fluid intake/assist with feeding    Acute metabolic encephalopathy  Patient with baseline dementia  Likely secondary to acute kidney failure/dehydration and UTI  Slowly improving. Calm and verbal now. Oriented x 2. Continue IV fluid and antibiotics  DC'd IV Ativan  Avoid CNS depressant meds  Continue Seroquel 25 mg nightly and monitor    DM2: A1c of 6.0%.  Blood sugars okay .  -Continue current insulin regimen.  -Not on any medication at home    Essential hypertension:   BP mostly controlled but intermittently with few elevated numbers. Continue Norvasc 10 mg daily  Continue hydralazine 25mg 3 times daily  Monitor    Moderate oral dysphagia with mild pharyngeal dysphagia:  -Speech therapy following.  -Diet advanced from puréed to easy chew today. UTI  Leukocytosis likely secondary to UTI  Urine culture with 75,000 CFU E. Coli, sensitivity pending  Chest x-ray noted with mild pulmonary vascular congestion, no pneumonia  Continue IV Levaquin  Procalcitonin trending down  Monitor    Chronic diastolic CHF  Compensated  Careful with IV fluid to avoid volume overload    Anemia  Acute on chronic  Hemoglobin down from 10.5 to 8.5  Likely acute blood loss from surgery  Now stable at 8.8  Continue to monitor  Transfuse if less than 7    Other comorbidities: History of CAD, GERD, dementia.     Prophylaxis: SC lovenox  Code status: Full code    Surrogate decision maker: Son    Disposition: SNF pending. Diet ADULT DIET; Easy to Chew   DVT Prophylaxis [x] Lovenox, []  Heparin, [] SCDs, [] Ambulation   GI Prophylaxis [] PPI,  [] H2 Blocker,  [] Carafate,  [] Diet/Tube Feeds   Code Status Full Code   MDM [] Low, [] Moderate,[x]  High       History of Present Illness:     Chief Complaint: Closed left hip fracture, initial encounter [facility])     Patient seen and evaluated at bedside this morning. Awake and alert. No complaints. Sitter present. States patient trying to remove her oxygen and also trying to get out of bed . Patient more aware of her surroundings and more conversant. Answers some questions appropriately. Unknown baseline    Objective:        Intake/Output Summary (Last 24 hours) at 5/9/2022 1219  Last data filed at 5/9/2022 0605  Gross per 24 hour   Intake    Output 1020 ml   Net -1020 ml      Vitals:   Vitals:    05/08/22 1700 05/08/22 2040 05/09/22 0300 05/09/22 0747   BP: (!) 147/71 (!) 150/76 (!) 162/71 122/61   Pulse: 67 61 62 60   Resp: 29 21 20 18   Temp: 98.4 °F (36.9 °C) 98.4 °F (36.9 °C) 98 °F (36.7 °C) 97.5 °F (36.4 °C)   TempSrc: Oral Oral Oral Oral   SpO2: 97% 99%  99%   Weight:       Height:       PF: (!) 24 L/min           Physical Exam:   GEN: Patient awake and alert ,but slightly confused, In no acute distress,verbally responsive to some questions. O2 nasal cannula at 2 L. Comfortable  HEENT: Dry mucous membranes. Opens eyes  RESP: Bilateral good air entry ,normal work of breathing. No wheezes/rhonchi/rales. CVS: RRR, S1, S2  GI/: Abdomen is soft, nontender, no organomegaly. .Bowel sounds normal  MSK/neuro: Decreased strength in the left lower extremity due to left femur fracture. Dressing left lateral hip. No edema  Neuro: Awake alert oriented to person and place but not ciara  Moves all extremities. SKIN: Normal coloration, warm, dry.     Medications:   Medications:    levofloxacin  500 mg IntraVENous Q24H    hydrALAZINE  25 mg Oral 3 times per day    haloperidol lactate  5 mg IntraMUSCular Once    QUEtiapine  25 mg Oral Nightly    enoxaparin  30 mg SubCUTAneous Daily    atorvastatin  10 mg Oral Daily    donepezil  5 mg Oral Nightly    ferrous sulfate  325 mg Oral Every Other Day    pantoprazole  20 mg Oral Daily    Vitamin D  1,000 Units Oral Daily    vitamin E  400 Units Oral Daily    sodium chloride flush  5-40 mL IntraVENous 2 times per day    amLODIPine  10 mg Oral Daily    insulin lispro  0-12 Units SubCUTAneous TID     insulin lispro  0-6 Units SubCUTAneous Nightly      Infusions:    sodium chloride 50 mL/hr at 05/08/22 0211    sodium chloride      dextrose       PRN Meds: budesonide-formoterol, 2 puff, BID PRN  HYDROcodone-acetaminophen, 1 tablet, Q6H PRN  hydrALAZINE (APRESOLINE) ivpb, 10 mg, Q6H PRN  acetaminophen, 650 mg, Q4H PRN  bisacodyl, 5 mg, Daily PRN  acetaminophen, 650 mg, Q6H PRN   Or  acetaminophen, 650 mg, Q6H PRN  albuterol sulfate HFA, 2 puff, Q6H PRN  sodium chloride flush, 10 mL, PRN  sodium chloride, , PRN  ondansetron, 4 mg, Q8H PRN   Or  ondansetron, 4 mg, Q6H PRN  polyethylene glycol, 17 g, Daily PRN  dextrose, 12.5 g, PRN  glucagon (rDNA), 1 mg, PRN  dextrose, 100 mL/hr, PRN  glucose, 16 g, PRN        Electronically signed by Kia Flores MD on 5/9/2022 at 12:19 PM My signature below certifies that the above stated patient is homebound and upon completion of the Face-To-Face encounter, has the need for intermittent skilled nursing, physical therapy and/or speech or occupational therapy services in their home for their current diagnosis as outlined in their initial plan of care. These services will continue to be monitored by myself or another physician.

## 2022-05-10 LAB
ANION GAP SERPL CALCULATED.3IONS-SCNC: 11 MMOL/L (ref 4–16)
ANISOCYTOSIS: ABNORMAL
BANDED NEUTROPHILS ABSOLUTE COUNT: 0.7 K/CU MM
BANDED NEUTROPHILS RELATIVE PERCENT: 6 % (ref 5–11)
BUN BLDV-MCNC: 27 MG/DL (ref 6–23)
CALCIUM SERPL-MCNC: 10.2 MG/DL (ref 8.3–10.6)
CHLORIDE BLD-SCNC: 107 MMOL/L (ref 99–110)
CO2: 25 MMOL/L (ref 21–32)
CREAT SERPL-MCNC: 0.7 MG/DL (ref 0.6–1.1)
CULTURE: ABNORMAL
CULTURE: ABNORMAL
DIFFERENTIAL TYPE: ABNORMAL
DOHLE BODIES: PRESENT
EOSINOPHILS ABSOLUTE: 0.5 K/CU MM
EOSINOPHILS RELATIVE PERCENT: 4 % (ref 0–3)
GFR AFRICAN AMERICAN: >60 ML/MIN/1.73M2
GFR NON-AFRICAN AMERICAN: >60 ML/MIN/1.73M2
GLUCOSE BLD-MCNC: 108 MG/DL (ref 70–99)
GLUCOSE BLD-MCNC: 136 MG/DL (ref 70–99)
GLUCOSE BLD-MCNC: 142 MG/DL (ref 70–99)
GLUCOSE BLD-MCNC: 152 MG/DL (ref 70–99)
GLUCOSE BLD-MCNC: 94 MG/DL (ref 70–99)
HCT VFR BLD CALC: 31.2 % (ref 37–47)
HEMOGLOBIN: 9 GM/DL (ref 12.5–16)
LYMPHOCYTES ABSOLUTE: 1.7 K/CU MM
LYMPHOCYTES RELATIVE PERCENT: 15 % (ref 24–44)
Lab: ABNORMAL
MCH RBC QN AUTO: 29.5 PG (ref 27–31)
MCHC RBC AUTO-ENTMCNC: 28.8 % (ref 32–36)
MCV RBC AUTO: 102.3 FL (ref 78–100)
METAMYELOCYTES ABSOLUTE COUNT: 0.12 K/CU MM
METAMYELOCYTES PERCENT: 1 %
MONOCYTES ABSOLUTE: 1.7 K/CU MM
MONOCYTES RELATIVE PERCENT: 15 % (ref 0–4)
MYELOCYTE PERCENT: 2 %
MYELOCYTES ABSOLUTE COUNT: 0.23 K/CU MM
PDW BLD-RTO: 14.3 % (ref 11.7–14.9)
PLATELET # BLD: 310 K/CU MM (ref 140–440)
PMV BLD AUTO: 9.4 FL (ref 7.5–11.1)
POTASSIUM SERPL-SCNC: 3.9 MMOL/L (ref 3.5–5.1)
PROCALCITONIN: 0.11
RBC # BLD: 3.05 M/CU MM (ref 4.2–5.4)
RBC # BLD: ABNORMAL 10*6/UL
SEGMENTED NEUTROPHILS ABSOLUTE COUNT: 6.7 K/CU MM
SEGMENTED NEUTROPHILS RELATIVE PERCENT: 57 % (ref 36–66)
SODIUM BLD-SCNC: 143 MMOL/L (ref 135–145)
SPECIMEN: ABNORMAL
WBC # BLD: 11.6 K/CU MM (ref 4–10.5)

## 2022-05-10 PROCEDURE — 6360000002 HC RX W HCPCS: Performed by: INTERNAL MEDICINE

## 2022-05-10 PROCEDURE — 85027 COMPLETE CBC AUTOMATED: CPT

## 2022-05-10 PROCEDURE — 94761 N-INVAS EAR/PLS OXIMETRY MLT: CPT

## 2022-05-10 PROCEDURE — 97110 THERAPEUTIC EXERCISES: CPT

## 2022-05-10 PROCEDURE — 2580000003 HC RX 258: Performed by: ORTHOPAEDIC SURGERY

## 2022-05-10 PROCEDURE — 6370000000 HC RX 637 (ALT 250 FOR IP): Performed by: ORTHOPAEDIC SURGERY

## 2022-05-10 PROCEDURE — 1200000000 HC SEMI PRIVATE

## 2022-05-10 PROCEDURE — 82962 GLUCOSE BLOOD TEST: CPT

## 2022-05-10 PROCEDURE — 97530 THERAPEUTIC ACTIVITIES: CPT

## 2022-05-10 PROCEDURE — 6360000002 HC RX W HCPCS: Performed by: ORTHOPAEDIC SURGERY

## 2022-05-10 PROCEDURE — 36415 COLL VENOUS BLD VENIPUNCTURE: CPT

## 2022-05-10 PROCEDURE — 80048 BASIC METABOLIC PNL TOTAL CA: CPT

## 2022-05-10 PROCEDURE — 84145 PROCALCITONIN (PCT): CPT

## 2022-05-10 PROCEDURE — 85007 BL SMEAR W/DIFF WBC COUNT: CPT

## 2022-05-10 PROCEDURE — 6370000000 HC RX 637 (ALT 250 FOR IP): Performed by: INTERNAL MEDICINE

## 2022-05-10 RX ORDER — CEFAZOLIN SODIUM 2 G/100ML
2000 INJECTION, SOLUTION INTRAVENOUS EVERY 8 HOURS
Status: DISCONTINUED | OUTPATIENT
Start: 2022-05-10 | End: 2022-05-14 | Stop reason: HOSPADM

## 2022-05-10 RX ADMIN — INSULIN LISPRO 2 UNITS: 100 INJECTION, SOLUTION INTRAVENOUS; SUBCUTANEOUS at 12:16

## 2022-05-10 RX ADMIN — CEFAZOLIN SODIUM 2000 MG: 2 INJECTION, SOLUTION INTRAVENOUS at 10:08

## 2022-05-10 RX ADMIN — POLYETHYLENE GLYCOL (3350) 17 G: 17 POWDER, FOR SOLUTION ORAL at 18:58

## 2022-05-10 RX ADMIN — HYDRALAZINE HYDROCHLORIDE 25 MG: 25 TABLET ORAL at 21:21

## 2022-05-10 RX ADMIN — HYDRALAZINE HYDROCHLORIDE 25 MG: 25 TABLET ORAL at 06:29

## 2022-05-10 RX ADMIN — AMLODIPINE BESYLATE 10 MG: 10 TABLET ORAL at 09:06

## 2022-05-10 RX ADMIN — HYDRALAZINE HYDROCHLORIDE 25 MG: 25 TABLET ORAL at 13:58

## 2022-05-10 RX ADMIN — HYDROCODONE BITARTRATE AND ACETAMINOPHEN 1 TABLET: 5; 325 TABLET ORAL at 21:21

## 2022-05-10 RX ADMIN — SODIUM CHLORIDE, PRESERVATIVE FREE 10 ML: 5 INJECTION INTRAVENOUS at 21:23

## 2022-05-10 RX ADMIN — QUETIAPINE FUMARATE 25 MG: 25 TABLET ORAL at 21:22

## 2022-05-10 RX ADMIN — ATORVASTATIN CALCIUM 10 MG: 10 TABLET, FILM COATED ORAL at 09:07

## 2022-05-10 RX ADMIN — CEFAZOLIN SODIUM 2000 MG: 2 INJECTION, SOLUTION INTRAVENOUS at 18:58

## 2022-05-10 RX ADMIN — Medication 400 UNITS: at 09:07

## 2022-05-10 RX ADMIN — PANTOPRAZOLE 20 MG: 20 TABLET, DELAYED RELEASE ORAL at 09:07

## 2022-05-10 RX ADMIN — HYDROCODONE BITARTRATE AND ACETAMINOPHEN 1 TABLET: 5; 325 TABLET ORAL at 12:14

## 2022-05-10 RX ADMIN — ENOXAPARIN SODIUM 30 MG: 100 INJECTION SUBCUTANEOUS at 09:06

## 2022-05-10 RX ADMIN — Medication 1000 UNITS: at 09:07

## 2022-05-10 RX ADMIN — DONEPEZIL HYDROCHLORIDE 5 MG: 5 TABLET, FILM COATED ORAL at 21:21

## 2022-05-10 RX ADMIN — HYDROCODONE BITARTRATE AND ACETAMINOPHEN 1 TABLET: 5; 325 TABLET ORAL at 06:28

## 2022-05-10 RX ADMIN — SODIUM CHLORIDE: 9 INJECTION, SOLUTION INTRAVENOUS at 18:56

## 2022-05-10 RX ADMIN — SODIUM CHLORIDE, PRESERVATIVE FREE 10 ML: 5 INJECTION INTRAVENOUS at 09:06

## 2022-05-10 ASSESSMENT — PAIN DESCRIPTION - DESCRIPTORS: DESCRIPTORS: PATIENT UNABLE TO DESCRIBE

## 2022-05-10 ASSESSMENT — PAIN SCALES - GENERAL
PAINLEVEL_OUTOF10: 4
PAINLEVEL_OUTOF10: 0
PAINLEVEL_OUTOF10: 6

## 2022-05-10 ASSESSMENT — PAIN SCALES - WONG BAKER
WONGBAKER_NUMERICALRESPONSE: 4
WONGBAKER_NUMERICALRESPONSE: 0
WONGBAKER_NUMERICALRESPONSE: 2
WONGBAKER_NUMERICALRESPONSE: 0
WONGBAKER_NUMERICALRESPONSE: 0

## 2022-05-10 NOTE — PROGRESS NOTES
PT has made multiple statements about dying, heaven, and telling her loved ones she will \"see them in heaven. \" Pt requested to speak to a  at [facility]. This nurse called Alexi Cole for pt. This nurse asked pt if she had thoughts of hurting herself. Pt denied. Sitter in room, call light left in reach. Pt bathed and changed.

## 2022-05-10 NOTE — SIGNIFICANT EVENT
Advance care planning discussion  Called and discussed with pravin Avilapark today 5/10/2022 at 3:20 PM about patient's advance care wishes and preference. Verified with bedside RN Uziel Madrid that patient should be kept comfortable in the event of a cardiac arrest.  Patient has lived a good life and this is what she wants. We will follow her wishes. Electronic record updated.   Total time: 18 minutes

## 2022-05-10 NOTE — PROGRESS NOTES
Hospitalist Progress Note      Name:  Jeaneth Da Silva /Age/Sex: 1935  (81 y.o. female)   MRN & CSN:  [id number] & [id number] Admission Date/Time: 2022 11:37 AM   Location:  [facility] PCP: Deborah Epperson [address] [place] [address] Day: 11  Discharge barrier/Reason for continued hospitalization: Multidrug-resistant E. coli    Assessment and Plan:   Jeaneth Da Silva is a 81 y.o. female with a past medical history of dementia, CHB s/p PPM, Hypertension, chronic diastolic CHF, dyslipidemia, hypertension  female  who presented to the ED on 2022 with fall when ambulance leading to Closed left hip fracture, initial encounter ([facility] [address]    1. Left hip fracture: S/p left hip IM nail on 2022  Orthopedics team has now signed off. .  Appreciate assistance. 2. MDRO E. coli: Likely leading to confusion  Resistant to Levaquin, stop. Start cefazolin-IV for 1 to 2 days before transitioning to p.o. Keflex on discharge. 3.  Acute kidney injury: Not present on admission. Due to poor p.o. intake. Creatinine was 0.8 on arrival, peaked at 2.1, down to 0.7 today  Continue to avoid nephrotoxic agents. 4.  Acute postoperative blood loss anemia: Expected  Hgb up about 10 g, down to around 8 g.    5.  Acute metabolic encephalopathy: Superimposed on chronic confusion and dementia  Acute episode due to BARRY and UTI. 6.  Hypertension: Continue hydralazine 25 mg 3 times daily, amlodipine 10 mg daily  7. Type 2 diabetes: Continue moderate dose insulin correctional scale. 8. Dyslipidemia:  Continue lipitor  9. Chronic diastolic CHF: Compensated. 10.  Dementia: No behavioral abnormalities at this time. Trial off therapeutic  today. Continue Aricept 5 mg qHS, seroquel 25 mg qHS   11. Goals of care: Full code.   Has ACP    Diet ADULT DIET; Easy to Chew   DVT Prophylaxis [x] Lovenox, []  Heparin, [] SCDs, [] Ambulation   GI Prophylaxis [] PPI,  [] H2 Blocker,  [] Carafate,  [x] Diet/Tube Feeds   Code Status Full Code   MDM [] Low, [] Moderate,[x]  High       History of Present Illness:     Chief Complaint: Closed left hip fracture, initial encounter ([facility] [address]     Beth Stallworth is a 81 y.o.  female  who presents with fall leading to left hip fracture. 5/10/2022: Patient is seen and examined, confused, does not know why she is here in the hospital.  She knows that she is in the hospital.  She knows the year correctly but thinks we are still in April. She thinks today is Monday instead of Tuesday. Ten point ROS reviewed, negative, unless as noted above    Objective: Intake/Output Summary (Last 24 hours) at 5/10/2022 0842  Last data filed at 5/9/2022 2213  Gross per 24 hour   Intake    Output 900 ml   Net -900 ml        Vitals:   Vitals:    05/10/22 0318 05/10/22 0400 05/10/22 0600 05/10/22 0628   BP: (!) 143/63      Pulse: 60      Resp: 17   16   Temp: 98.6 °F (37 °C)      TempSrc: Oral Oral     SpO2: 100%      Weight:   220 lb 0.3 oz (99.8 kg)    Height:       PF:            Physical Exam:   GEN: Awake female, alert and oriented x 1-2  in no apparent distress. Appears given age. HEENT: Normal   RESP: Clear lung fields bilaterally. Symmetric chest movement while on RA  CVS: RRR, S1, S2. Left upper chest with PPM  GI/: Abdomen is soft, no organomegaly. . Bowel sounds normal, rectal exam deferred. Mild suprapubic tenderness  No CVA tenderness. MSK: No gross joint deformities. No tenderness  SKIN: Normal coloration, warm, dry. NEURO: Cranial nerves appear grossly intact, normal speech, no lateralizing weakness.   PSYCH:  Affect appropriate     Medications:   Medications:    ceFAZolin  2,000 mg IntraVENous Q8H    hydrALAZINE  25 mg Oral 3 times per day    haloperidol lactate  5 mg IntraMUSCular Once    QUEtiapine  25 mg Oral Nightly    enoxaparin  30 mg SubCUTAneous Daily    atorvastatin  10 mg Oral Daily    donepezil  5 mg Oral Nightly    ferrous sulfate  325 mg Oral Every Other Day    pantoprazole  20 mg Oral Daily    Vitamin D  1,000 Units Oral Daily    vitamin E  400 Units Oral Daily    sodium chloride flush  5-40 mL IntraVENous 2 times per day    amLODIPine  10 mg Oral Daily    insulin lispro  0-12 Units SubCUTAneous TID WC    insulin lispro  0-6 Units SubCUTAneous Nightly      Infusions:    sodium chloride      dextrose       PRN Meds: budesonide-formoterol, 2 puff, BID PRN  HYDROcodone-acetaminophen, 1 tablet, Q6H PRN  hydrALAZINE (APRESOLINE) ivpb, 10 mg, Q6H PRN  acetaminophen, 650 mg, Q4H PRN  bisacodyl, 5 mg, Daily PRN  acetaminophen, 650 mg, Q6H PRN   Or  acetaminophen, 650 mg, Q6H PRN  albuterol sulfate HFA, 2 puff, Q6H PRN  sodium chloride flush, 10 mL, PRN  sodium chloride, , PRN  ondansetron, 4 mg, Q8H PRN   Or  ondansetron, 4 mg, Q6H PRN  polyethylene glycol, 17 g, Daily PRN  dextrose, 12.5 g, PRN  glucagon (rDNA), 1 mg, PRN  dextrose, 100 mL/hr, PRN  glucose, 16 g, PRN          Electronically signed by Chanda Gomez MD on 5/10/2022 at 8:42 AM

## 2022-05-10 NOTE — CARE COORDINATION
Cm reviewed chart. The patient is pending a referral to [address] called [facility] and the admission coordinator is out sick. Cm stated to the person covering that a referral was sent and a precert should have been started. The person I talked to was going to check into this. The patient is not medically cleared today.

## 2022-05-10 NOTE — PLAN OF CARE
Problem: Discharge Planning  Goal: Discharge to home or other facility with appropriate resources  Outcome: Progressing  Flowsheets (Taken 5/10/2022 0900)  Discharge to home or other facility with appropriate resources: Identify barriers to discharge with patient and caregiver     Problem: Pain  Goal: Verbalizes/displays adequate comfort level or baseline comfort level  Outcome: Progressing     Problem: Chronic Conditions and Co-morbidities  Goal: Patient's chronic conditions and co-morbidity symptoms are monitored and maintained or improved  Outcome: Progressing  Flowsheets (Taken 5/10/2022 0900)  Care Plan - Patient's Chronic Conditions and Co-Morbidity Symptoms are Monitored and Maintained or Improved: Monitor and assess patient's chronic conditions and comorbid symptoms for stability, deterioration, or improvement     Problem: Safety - Adult  Goal: Free from fall injury  Outcome: Progressing     Problem: Skin/Tissue Integrity  Goal: Absence of new skin breakdown  Description: 1. Monitor for areas of redness and/or skin breakdown  2. Assess vascular access sites hourly  3. Every 4-6 hours minimum:  Change oxygen saturation probe site  4. Every 4-6 hours:  If on nasal continuous positive airway pressure, respiratory therapy assess nares and determine need for appliance change or resting period.   Outcome: Progressing     Problem: ABCDS Injury Assessment  Goal: Absence of physical injury  Outcome: Progressing

## 2022-05-10 NOTE — PROGRESS NOTES
Physical Therapy  Name: Nettie Hernandez MRN: [id number] :   1935   Date:  5/10/2022   Admission Date: 2022 Room:  [address] [place] [address]   Restrictions/Precautions:  Restrictions/Precautions  Restrictions/Precautions: Fall Risk,General Precautions,Weight Bearing Lower Extremity Weight Bearing Restrictions  Left Lower Extremity Weight Bearing: Weight Bearing As Tolerated Upper Extremity Weight Bearing Restrictions  Other: 4 L of nc O2 in place on eval   Communication with other providers:  Dex Feldman RN states pt is ok to see for therapy and states that the patient has received pain medication recently    Subjective:  Patient states:  She is agreeable for BLE ROM and bed mobility today. Pain:   Location, Type, Intensity (0/10 to 10/10):  LLE discomfort but does not quantify    Objective:    Observation:  Patient is supine with Hob slightly elevated    Treatment, including education/measures:    Transfers with line management of NC O2 4L, SCD cuffs, BP cuff and pulse ox  Scooting: Dep x1 to midline  Rolling: Max A x1 to ea side for functional mobility. Max cues and manual guidance for ea roll. Patient is able to initiate today without extreme discomfort  Positioning: Heels floating at EOS     Supine exercises: AAROM with max encouragement and   AP: 10  Heel slides: 10  SLR: 10    Safety  Patient left safely in the bed, with call light/phone in reach with alarm applied. Gait belt and mask were used for transfers and gait.   Assessment / Impression:     Patient's tolerance of treatment:  Good   Adverse Reaction: None  Significant change in status and impact:  Increase participation and tolerance with today's session  Barriers to improvement:  cognition  Plan for Next Session:    Will cont to work towards pt's goals per patient tolerance  Time in:  1515  Time out:  1538  Timed treatment minutes:  23  Total treatment time:  23    Previously filed items:  Social/Functional History  Additional Comments: disoriented, confused, answers changing during assessment ; states was ind w/ mobility w/o device prev, assist w/ ADL's home mgmt; from [facility], will need further info clarified 2/2 impaired cog     Long Term Goals  Time Frame for Long term goals : 1 week  Long term goal 1: pt will complete bed mobility at mod A  Long term goal 2: pt will complete transfers at min A  Long term goal 3: pt will ambulate 25 ft using FWW at min A    Electronically signed by:     Giles Chong PTA  5/10/2022, 4:32 PM

## 2022-05-11 LAB
GLUCOSE BLD-MCNC: 127 MG/DL (ref 70–99)
GLUCOSE BLD-MCNC: 138 MG/DL (ref 70–99)
GLUCOSE BLD-MCNC: 156 MG/DL (ref 70–99)
GLUCOSE BLD-MCNC: 94 MG/DL (ref 70–99)

## 2022-05-11 PROCEDURE — 97530 THERAPEUTIC ACTIVITIES: CPT

## 2022-05-11 PROCEDURE — 6370000000 HC RX 637 (ALT 250 FOR IP): Performed by: INTERNAL MEDICINE

## 2022-05-11 PROCEDURE — 1200000000 HC SEMI PRIVATE

## 2022-05-11 PROCEDURE — 6370000000 HC RX 637 (ALT 250 FOR IP): Performed by: ORTHOPAEDIC SURGERY

## 2022-05-11 PROCEDURE — 2580000003 HC RX 258: Performed by: ORTHOPAEDIC SURGERY

## 2022-05-11 PROCEDURE — 6360000002 HC RX W HCPCS: Performed by: INTERNAL MEDICINE

## 2022-05-11 PROCEDURE — 2700000000 HC OXYGEN THERAPY PER DAY

## 2022-05-11 PROCEDURE — 97535 SELF CARE MNGMENT TRAINING: CPT

## 2022-05-11 PROCEDURE — 82962 GLUCOSE BLOOD TEST: CPT

## 2022-05-11 PROCEDURE — 76937 US GUIDE VASCULAR ACCESS: CPT

## 2022-05-11 PROCEDURE — 6360000002 HC RX W HCPCS: Performed by: ORTHOPAEDIC SURGERY

## 2022-05-11 PROCEDURE — 94761 N-INVAS EAR/PLS OXIMETRY MLT: CPT

## 2022-05-11 RX ADMIN — ATORVASTATIN CALCIUM 10 MG: 10 TABLET, FILM COATED ORAL at 09:40

## 2022-05-11 RX ADMIN — HYDROCODONE BITARTRATE AND ACETAMINOPHEN 1 TABLET: 5; 325 TABLET ORAL at 15:07

## 2022-05-11 RX ADMIN — INSULIN LISPRO 2 UNITS: 100 INJECTION, SOLUTION INTRAVENOUS; SUBCUTANEOUS at 17:40

## 2022-05-11 RX ADMIN — ENOXAPARIN SODIUM 30 MG: 100 INJECTION SUBCUTANEOUS at 09:41

## 2022-05-11 RX ADMIN — SODIUM CHLORIDE, PRESERVATIVE FREE 10 ML: 5 INJECTION INTRAVENOUS at 21:42

## 2022-05-11 RX ADMIN — HYDRALAZINE HYDROCHLORIDE 25 MG: 25 TABLET ORAL at 14:02

## 2022-05-11 RX ADMIN — SODIUM CHLORIDE: 9 INJECTION, SOLUTION INTRAVENOUS at 01:57

## 2022-05-11 RX ADMIN — DONEPEZIL HYDROCHLORIDE 5 MG: 5 TABLET, FILM COATED ORAL at 21:44

## 2022-05-11 RX ADMIN — SODIUM CHLORIDE, PRESERVATIVE FREE 10 ML: 5 INJECTION INTRAVENOUS at 09:41

## 2022-05-11 RX ADMIN — SODIUM CHLORIDE: 9 INJECTION, SOLUTION INTRAVENOUS at 10:34

## 2022-05-11 RX ADMIN — HYDRALAZINE HYDROCHLORIDE 25 MG: 25 TABLET ORAL at 05:29

## 2022-05-11 RX ADMIN — PANTOPRAZOLE 20 MG: 20 TABLET, DELAYED RELEASE ORAL at 09:41

## 2022-05-11 RX ADMIN — QUETIAPINE FUMARATE 25 MG: 25 TABLET ORAL at 21:44

## 2022-05-11 RX ADMIN — Medication 400 UNITS: at 09:57

## 2022-05-11 RX ADMIN — FERROUS SULFATE TAB 325 MG (65 MG ELEMENTAL FE) 325 MG: 325 (65 FE) TAB at 09:41

## 2022-05-11 RX ADMIN — AMLODIPINE BESYLATE 10 MG: 10 TABLET ORAL at 09:41

## 2022-05-11 RX ADMIN — HYDROCODONE BITARTRATE AND ACETAMINOPHEN 1 TABLET: 5; 325 TABLET ORAL at 05:29

## 2022-05-11 RX ADMIN — Medication 1000 UNITS: at 09:40

## 2022-05-11 RX ADMIN — CEFAZOLIN SODIUM 2000 MG: 2 INJECTION, SOLUTION INTRAVENOUS at 02:00

## 2022-05-11 RX ADMIN — CEFAZOLIN SODIUM 2000 MG: 2 INJECTION, SOLUTION INTRAVENOUS at 18:08

## 2022-05-11 RX ADMIN — CEFAZOLIN SODIUM 2000 MG: 2 INJECTION, SOLUTION INTRAVENOUS at 10:37

## 2022-05-11 RX ADMIN — SODIUM CHLORIDE: 9 INJECTION, SOLUTION INTRAVENOUS at 18:08

## 2022-05-11 RX ADMIN — HYDRALAZINE HYDROCHLORIDE 25 MG: 25 TABLET ORAL at 21:44

## 2022-05-11 RX ADMIN — HYDROCODONE BITARTRATE AND ACETAMINOPHEN 1 TABLET: 5; 325 TABLET ORAL at 21:44

## 2022-05-11 ASSESSMENT — PAIN DESCRIPTION - LOCATION
LOCATION: HIP
LOCATION: HIP

## 2022-05-11 ASSESSMENT — PAIN DESCRIPTION - ORIENTATION
ORIENTATION: LEFT
ORIENTATION: LEFT

## 2022-05-11 ASSESSMENT — PAIN SCALES - GENERAL
PAINLEVEL_OUTOF10: 5
PAINLEVEL_OUTOF10: 5
PAINLEVEL_OUTOF10: 0
PAINLEVEL_OUTOF10: 5
PAINLEVEL_OUTOF10: 3

## 2022-05-11 ASSESSMENT — PAIN SCALES - WONG BAKER: WONGBAKER_NUMERICALRESPONSE: 0

## 2022-05-11 ASSESSMENT — PAIN DESCRIPTION - DESCRIPTORS: DESCRIPTORS: SHARP

## 2022-05-11 NOTE — PROGRESS NOTES
Physical Therapy  Name: Bairon Mercer MRN: [id number] :   1935   Date:  2022   Ad[facility] Date: 2022 [facility]   Restrictions/Precautions:  Restrictions/Precautions  Restrictions/Precautions: Fall Risk,General Precautions,Weight Bearing Lower Extremity Weight Bearing Restrictions  Left Lower Extremity Weight Bearing: Weight Bearing As Tolerated Upper Extremity Weight Bearing Restrictions  Other: 4 L of nc O2 in place on eval   Communication with other providers: RN states pt is ok to see for therapy and states that the patient hasn't had any pain medication today. Cotx with Nik Sa. Arrive to patient's room 1hr later d/t patient yelling out for help. Patient still on bed pan despite MICHEL and PTA communicating to staff patient should not remain on bed pan for no longer than 1415PM. Patient continues to be in moderate-max pain despite therapy requesting pain medication on behalf of the patient. Subjective:  Patient states:  She is agreeable for rehab today  Pain:   Location, Type, Intensity (0/10 to 10/10):  LLE pain but does not quantify    Objective:    Observation:  Patient is supine with Hob slightly elevated    Treatment, including education/measures:    Transfers with line management of NC O2 4L, SCD cuffs, BP cuff and pulse ox  Max time for intermittent education with expected pain with movement and for cues with each activity     Scooting: Dep x1 to midline. Supine scoot to HOB, bed in trendelenberg dep x2    Rolling: Max A x1 the Rt x2 sets. Pillow between BLE for hip protection against adduction Max cues and manual guidance for ea roll. Patient is able to initiate today with mild discomfort    Supine to sit: Max A x2 for trunk and BLE    Sit to stand: Max A x2 to RW x2 sets, walker adjusted between sets for patient height, Max verbal, visual and manual assitance for sequence with BLE LUPE, and BUE effort into EOB and BUE use of walker upon standing.  No Outbursts or outcries of pain but expressed discomfort in LLE    Standing balance: Max A x2 with BUE into RW and with manual assistance for equal weight shift, d/t patient heavy lean to the left, and for increase hip extension for upright posture. Assist level decreases with cues for BUE effort into walker and equal weight distribution between BLE    Stand to sit: Min A x2 for safet descent, patient is lani to reach back for EOB with cues    Sit to Supine: Max A x2 with pillow between knees for hip protection    Positioning: Heels floating at EOS with small bed pan under patient per patient requests to have BM. RN notified at EOS     **Added time d/t patient need to roll for clean up and off bedpan  Rolling: Max A x2 to ea side. Patient is able to tolerates sustained side lying today during clean up. Max cues and manual inititaion with head turn, and UE reaching. Pillow between knees to protect hip from adduction. Scooting: Boost to HOB supine dep x2    Positioning: Heel floating, HOB and feet slightly elevated     Safety  Patient left safely in the bed, with call light/phone in reach with alarm applied. Gait belt and mask were used for transfers and gait.   Assessment / Impression:     Patient's tolerance of treatment:  Good   Adverse Reaction: None  Significant change in status and impact:  Increase participation and tolerance with today's session  Barriers to improvement:  cognition  Plan for Next Session:    Will cont to work towards pt's goals per patient tolerance  Time in:  [phone]  Time out:  [id number]  Timed treatment minutes:  44 + 10  Total treatment time:  54    Previously filed items:  Social/Functional History  Additional Comments: disoriented, confused, answers changing during assessment ; states was ind w/ mobility w/o device prev, assist w/ ADL's home mgmt; from [address] [place] [address], will need further info clarified 2/2 impaired cog     Long Term Goals  Time Frame for Long term goals : 1 week  Long term goal 1: pt will complete bed mobility at mod A  Long term goal 2: pt will complete transfers at min A  Long term goal 3: pt will ambulate 25 ft using FWW at min A    Electronically signed by:     Trudy Hager, LUIS  5/11/2022, 1:43 PM

## 2022-05-11 NOTE — PROGRESS NOTES
Occupational Therapy Treatment Note  Name: Jeaneth Da Silva MRN: [id number] :   1935   Date:  2022   Admission Date: 2022 Room:  [address] [place] [address]     Restrictions/Precautions:  Restrictions/Precautions  Restrictions/Precautions: Fall Risk,General Precautions,Weight Bearing   Lower Extremity Weight Bearing Restrictions  Left Lower Extremity Weight Bearing: Weight Bearing As Tolerated Upper Extremity Weight Bearing Restrictions  Other: 4 L of nc O2 in place on eval     Communication with other providers:  Per chart review and Nurse patient is appropriate for therapeutic intervention. Co tx with PTA Mariano Ray    Subjective:  Patient states:  Agreeable to OT therapy. Pain: Did not rate for pain at LLE (location, type, intensity)    Objective:    Observation:  Semi baltazar's position upon arrival.     Treatment, including education:  Self Care Training:   Cues were given for safety, sequence, UE/LE placement, visual cues, and balance. Activities performed today included dressing, toileting, hand hygiene, and grooming. Engaged perineal hygiene with Max A x2 in supine position. Setup with face washing in supine position. Therapeutic Activity Training:   Therapeutic activity training was instructed today. Cues were given for safety, sequence, UE/LE placement, awareness, and balance. Bed mobility:Max A with LETTY rolling, Max A x2 supine<>sit  Sitting balance: F ~12 mins   Sit/stand transfers:Max A x2 utilizing RW, req mod verbal/tactile cues for upright posture and midline position. L lateral lean >80%. Completed 2 sets with static standing balance training. Req increase time for recovery time between reps. Tolerated 2-3 mins of standing balance.      Activities performed today included bed mobility training, transfers, functional mobility  to increase strength, activity tolerance to facilitate IND c ADL tasks, func transfers / mobility with G safety awareness carryover    Assessment / Impression: Increase anxiety with standing activity. Demo F techs and activity tolerance throughout tx. Patient's tolerance of treatment: good   Adverse Reaction: non3  Significant change in status and impact:  none  Barriers to improvement: transfers, activity tolerance, standing tolerance    Safety  Patient educated on role of OT , benefits of OT and rationale for therapeutic intervention. Patient safely in bed on the bedpan + alarm set at end of session, with call light/phone in reach, and nursing aware. Gait belt was used for func transfers / mobility. Alerted nurse about pt current status on bedpan.      Plan for Next Session:    Continue OT POC  Time in:  1996  Time out:  1357  Timed treatment minutes:  44  Total treatment time:  44    Previously filed items:  Social/Functional History  Additional Comments: disoriented, confused, answers changing during assessment ; states was ind w/ mobility w/o device prev, assist w/ ADL's home mgmt; from [address] [place] [address], will need further info clarified 2/2 impaired cog    Electronically signed by:    KIRBY Arias,   5/11/2022, 1:58 PM

## 2022-05-11 NOTE — PROGRESS NOTES
Hospitalist Progress Note      Name:  Radha Dyer /Age/Sex: 1935  (81 y.o. female)   MRN & CSN:  [id number] & [id number] Admission Date/Time: 2022 11:37 AM   [facility] Day: 12  Discharge barrier/Reason for continued hospitalization: IV antibiotics for multidrug-resistant E. coli    Assessment and Plan:   Radha Dyer is a 81 y.o. female with a past medical history of dementia, CHB s/p PPM, Hypertension, chronic diastolic CHF, dyslipidemia, hypertension  female  who presented to the ED on 2022 with fall when ambulating leading to Closed left hip fracture, initial encounter ([facility] [address]    1. Left hip fracture: S/p left hip IM nail on 2022  Orthopedics team has now signed off. .  Appreciate assistance. 2. MDRO E. coli: Likely leading to change in mental status  Continue IV cefazolin, day 2 today. Transition to Bactrim or Macrobid from a.m to complete 10 days of antibiotic treatment. 3.  Acute kidney injury: Not present on admission. Due to poor p.o. intake. Cr was 0.8 on arrival, peaked at 2.1, down trended to 0.7. Continue to avoid nephrotoxic agents. 4.  Acute postoperative blood loss anemia: Expected  Hgb up about 10 g, down to around 8 g.    5.  Acute metabolic encephalopathy: In a patient with baseline dementia  Acute encephalopathy now resolved. Acute encephalopathy was due to BARRY and UTI. 6.  Hypertension: Continue hydralazine 25 mg 3 times daily, amlodipine 10 mg daily  7. Type 2 diabetes: Continue moderate dose insulin correctional scale. 8. Dyslipidemia:  Continue lipitor  9. Chronic diastolic CHF: Compensated. 10.  Dementia: No behavioral abnormalities at this time. Discussed with bedside RN, trial off therapeutic  today. Continue Aricept 5 mg qHS, seroquel 25 mg qHS   11. Goals of care: DNR CC. See significant event progress note on 5/10/2022.     Diet ADULT DIET; Easy to Chew   DVT Prophylaxis [x] Lovenox, []  Heparin, [] SCDs, [] Ambulation   GI Prophylaxis [] PPI,  [] H2 Blocker,  [] Carafate,  [x] Diet/Tube Feeds   Code Status DNR-CC   MDM [] Low, [] Moderate,[x]  High       History of Present Illness:     Chief Complaint: Closed left hip fracture, initial encounter ([facility] [address]     Niecy Raygoza is a 81 y.o.  female  who presents with fall leading to left hip fracture. 5/10/2022: Patient is seen and examined, confused, does not know why she is here in the hospital.  She knows that she is in the hospital.  She knows the year correctly but thinks we are still in April. She thinks today is Monday instead of Tuesday. 5/11/2022: Patient is seen and examined, she recognizes me from yesterday. She again knows that she is in the hospital and she knows the year correctly. Has no new complaints. Had a bowel movement yesterday. She still has a therapeutic  at bedside. Ten point ROS reviewed, negative, unless as noted above    Objective: Intake/Output Summary (Last 24 hours) at 5/11/2022 1057  Last data filed at 5/10/2022 2128  Gross per 24 hour   Intake    Output 450 ml   Net -450 ml        Vitals:   Vitals:    05/11/22 0400 05/11/22 0527 05/11/22 0529 05/11/22 0700   BP:       Pulse:       Resp:   20 17   Temp:       TempSrc: Oral      SpO2:    96%   Weight:  226 lb 3.1 oz (102.6 kg)     Height:       PF:            Physical Exam:   GEN: Awake female, alert and oriented x 2  in no apparent distress. Appears given age. HEENT: Normal   RESP: Diminished breath sounds in the bases. Symmetric chest movement while on RA  CVS: RRR, S1, S2. Left upper chest with PPM  GI/: Abdomen is soft, no organomegaly. . Bowel sounds normal, rectal exam deferred. No more suprapubic tenderness  No CVA tenderness. MSK: No gross joint deformities. Mild tenderness, not unexpected, over left hip. SKIN: Normal coloration, warm, dry.   NEURO: Cranial nerves appear grossly intact, normal speech, no lateralizing weakness.   PSYCH:  Affect appropriate     Medications:   Medications:    ceFAZolin  2,000 mg IntraVENous Q8H    hydrALAZINE  25 mg Oral 3 times per day    QUEtiapine  25 mg Oral Nightly    enoxaparin  30 mg SubCUTAneous Daily    atorvastatin  10 mg Oral Daily    donepezil  5 mg Oral Nightly    ferrous sulfate  325 mg Oral Every Other Day    pantoprazole  20 mg Oral Daily    Vitamin D  1,000 Units Oral Daily    vitamin E  400 Units Oral Daily    sodium chloride flush  5-40 mL IntraVENous 2 times per day    amLODIPine  10 mg Oral Daily    insulin lispro  0-12 Units SubCUTAneous TID WC    insulin lispro  0-6 Units SubCUTAneous Nightly      Infusions:    sodium chloride 25 mL/hr at 05/11/22 1034    dextrose       PRN Meds: budesonide-formoterol, 2 puff, BID PRN  HYDROcodone-acetaminophen, 1 tablet, Q6H PRN  hydrALAZINE (APRESOLINE) ivpb, 10 mg, Q6H PRN  acetaminophen, 650 mg, Q4H PRN  bisacodyl, 5 mg, Daily PRN  acetaminophen, 650 mg, Q6H PRN   Or  acetaminophen, 650 mg, Q6H PRN  albuterol sulfate HFA, 2 puff, Q6H PRN  sodium chloride flush, 10 mL, PRN  sodium chloride, , PRN  ondansetron, 4 mg, Q8H PRN   Or  ondansetron, 4 mg, Q6H PRN  polyethylene glycol, 17 g, Daily PRN  dextrose, 12.5 g, PRN  glucagon (rDNA), 1 mg, PRN  dextrose, 100 mL/hr, PRN  glucose, 16 g, PRN          Electronically signed by Miguel Angel Saldivar MD on 5/11/2022 at 10:57 AM

## 2022-05-12 LAB
GLUCOSE BLD-MCNC: 121 MG/DL (ref 70–99)
GLUCOSE BLD-MCNC: 122 MG/DL (ref 70–99)
GLUCOSE BLD-MCNC: 131 MG/DL (ref 70–99)
GLUCOSE BLD-MCNC: 154 MG/DL (ref 70–99)

## 2022-05-12 PROCEDURE — 6360000002 HC RX W HCPCS: Performed by: INTERNAL MEDICINE

## 2022-05-12 PROCEDURE — 2580000003 HC RX 258: Performed by: ORTHOPAEDIC SURGERY

## 2022-05-12 PROCEDURE — 6370000000 HC RX 637 (ALT 250 FOR IP): Performed by: ORTHOPAEDIC SURGERY

## 2022-05-12 PROCEDURE — 6360000002 HC RX W HCPCS: Performed by: ORTHOPAEDIC SURGERY

## 2022-05-12 PROCEDURE — 2700000000 HC OXYGEN THERAPY PER DAY

## 2022-05-12 PROCEDURE — 94761 N-INVAS EAR/PLS OXIMETRY MLT: CPT

## 2022-05-12 PROCEDURE — 6370000000 HC RX 637 (ALT 250 FOR IP): Performed by: INTERNAL MEDICINE

## 2022-05-12 PROCEDURE — 82962 GLUCOSE BLOOD TEST: CPT

## 2022-05-12 PROCEDURE — 1200000000 HC SEMI PRIVATE

## 2022-05-12 RX ORDER — NITROFURANTOIN 25; 75 MG/1; MG/1
100 CAPSULE ORAL 2 TIMES DAILY
Qty: 20 CAPSULE | Refills: 0 | Status: SHIPPED | OUTPATIENT
Start: 2022-05-12 | End: 2022-05-22

## 2022-05-12 RX ORDER — NITROFURANTOIN 25; 75 MG/1; MG/1
100 CAPSULE ORAL EVERY 12 HOURS SCHEDULED
Status: DISCONTINUED | OUTPATIENT
Start: 2022-05-12 | End: 2022-05-14 | Stop reason: HOSPADM

## 2022-05-12 RX ORDER — HYDRALAZINE HYDROCHLORIDE 25 MG/1
25 TABLET, FILM COATED ORAL EVERY 8 HOURS SCHEDULED
Qty: 90 TABLET | Refills: 3 | Status: SHIPPED | OUTPATIENT
Start: 2022-05-12

## 2022-05-12 RX ORDER — HYDROCODONE BITARTRATE AND ACETAMINOPHEN 5; 325 MG/1; MG/1
1 TABLET ORAL EVERY 6 HOURS PRN
Qty: 12 TABLET | Refills: 0 | Status: SHIPPED | OUTPATIENT
Start: 2022-05-12 | End: 2022-05-15

## 2022-05-12 RX ORDER — QUETIAPINE FUMARATE 25 MG/1
25 TABLET, FILM COATED ORAL NIGHTLY
Qty: 60 TABLET | Refills: 3 | Status: SHIPPED | OUTPATIENT
Start: 2022-05-12

## 2022-05-12 RX ADMIN — PANTOPRAZOLE 20 MG: 20 TABLET, DELAYED RELEASE ORAL at 09:33

## 2022-05-12 RX ADMIN — Medication 1000 UNITS: at 09:32

## 2022-05-12 RX ADMIN — CEFAZOLIN SODIUM 2000 MG: 2 INJECTION, SOLUTION INTRAVENOUS at 02:26

## 2022-05-12 RX ADMIN — CEFAZOLIN SODIUM 2000 MG: 2 INJECTION, SOLUTION INTRAVENOUS at 10:23

## 2022-05-12 RX ADMIN — SODIUM CHLORIDE: 9 INJECTION, SOLUTION INTRAVENOUS at 02:24

## 2022-05-12 RX ADMIN — DONEPEZIL HYDROCHLORIDE 5 MG: 5 TABLET, FILM COATED ORAL at 21:44

## 2022-05-12 RX ADMIN — HYDROCODONE BITARTRATE AND ACETAMINOPHEN 1 TABLET: 5; 325 TABLET ORAL at 21:45

## 2022-05-12 RX ADMIN — ENOXAPARIN SODIUM 30 MG: 100 INJECTION SUBCUTANEOUS at 09:33

## 2022-05-12 RX ADMIN — SODIUM CHLORIDE, PRESERVATIVE FREE 10 ML: 5 INJECTION INTRAVENOUS at 09:34

## 2022-05-12 RX ADMIN — HYDRALAZINE HYDROCHLORIDE 25 MG: 25 TABLET ORAL at 21:44

## 2022-05-12 RX ADMIN — HYDRALAZINE HYDROCHLORIDE 25 MG: 25 TABLET ORAL at 06:52

## 2022-05-12 RX ADMIN — QUETIAPINE FUMARATE 25 MG: 25 TABLET ORAL at 21:44

## 2022-05-12 RX ADMIN — HYDRALAZINE HYDROCHLORIDE 25 MG: 25 TABLET ORAL at 14:30

## 2022-05-12 RX ADMIN — ATORVASTATIN CALCIUM 10 MG: 10 TABLET, FILM COATED ORAL at 09:33

## 2022-05-12 RX ADMIN — CEFAZOLIN SODIUM 2000 MG: 2 INJECTION, SOLUTION INTRAVENOUS at 17:17

## 2022-05-12 RX ADMIN — NITROFURANTOIN (MONOHYDRATE/MACROCRYSTALS) 100 MG: 75; 25 CAPSULE ORAL at 23:00

## 2022-05-12 RX ADMIN — Medication 400 UNITS: at 09:34

## 2022-05-12 RX ADMIN — AMLODIPINE BESYLATE 10 MG: 10 TABLET ORAL at 09:33

## 2022-05-12 ASSESSMENT — PAIN DESCRIPTION - PAIN TYPE: TYPE: SURGICAL PAIN

## 2022-05-12 ASSESSMENT — PAIN DESCRIPTION - ORIENTATION: ORIENTATION: LEFT

## 2022-05-12 ASSESSMENT — PAIN SCALES - GENERAL
PAINLEVEL_OUTOF10: 6
PAINLEVEL_OUTOF10: 0
PAINLEVEL_OUTOF10: 6

## 2022-05-12 ASSESSMENT — PAIN DESCRIPTION - LOCATION: LOCATION: HIP

## 2022-05-12 ASSESSMENT — PAIN DESCRIPTION - ONSET: ONSET: ON-GOING

## 2022-05-12 ASSESSMENT — PAIN SCALES - WONG BAKER: WONGBAKER_NUMERICALRESPONSE: 0

## 2022-05-12 NOTE — CONSULTS
Consult completed. Nexiva 20g 1.75 inch catheter inserted via ultrasound in patient's KARYN. Brisk blood return noted and catheter flushes with ease. Patient tolerated well. Dejah. primary RN notified. Consult IV/PICC team if patient's needs change.

## 2022-05-12 NOTE — CARE COORDINATION
CELINA called Karan Marquez, [phone], at [facility] for her to return my call. Waiting for precert to be done. .  Received call from Karan Marquez who stated that precert is pending. Pt has to be sitter free for 24 hours before they can go to the SNF once precert is received.

## 2022-05-12 NOTE — DISCHARGE SUMMARY
Consider this progress note for 2022. Patient could not discharge as there was no precertification available. Discharge Summary    Name:  Olga Arroyo /Age/Sex: 1935  (81 y.o. female)   MRN & CSN:  [id number] & [id number] Admission Date/Time: 2022 11:37 AM   Attending:  Tereso Muñoz MD Discharging Physician: Tereso Muñoz MD     Hospital Course:   Olga Arroyo is a 81 y.o.  female with a past medical history of dementia, CHB s/p PPM, Hypertension, chronic diastolic CHF, dyslipidemia, hypertension  female  who presented to the ED on 2022 with fall when ambulating leading to Closed left hip fracture  Patient underwent left hip IM nail on 2022. During hospital course, patient developed confusion, poor oral intake leading to acute kidney injury. Creatinine peaked at 2.1. Patient was managed for prerenal acute kidney injury with IV fluids and creatinine is now down trended to baseline of 0.7. Patient did have acute postoperative blood loss anemia that was expected due to surgery. Hgb stable above 8 g. Has not needed to be transfused. Patient had acute metabolic encephalopathy superimposed on baseline dementia likely precipitated by BARRY and UTI. Urine cultures grew MDRO E. coli. Patient was started on IV cefazolin and has been transitioned to Macrobid to complete 10 days of treatment. Goals of care discussion was had with patient and family and patient opted to be DNR CC at this time. The patient/family expressed appropriate understanding of and agreement with the discharge recommendations, medications, and plan.      Consults this admission:  IP CONSULT TO ORTHOPEDIC SURGERY  IP CONSULT TO CASE MANAGEMENT  IP CONSULT TO IV TEAM    Discharge Instruction:   Follow up appointments: Orthopedics  Primary care physician:  within 2 weeks    Diet:  regular diet   Activity: activity as tolerated  Disposition: Discharged to:   []Home, []HHC, [x]SNF, []Acute Rehab, []Hospice   Condition on discharge: Stable    Discharge Medications:        Medication List      START taking these medications    hydrALAZINE 25 MG tablet  Commonly known as: APRESOLINE  Take 1 tablet by mouth every 8 hours     HYDROcodone-acetaminophen 5-325 MG per tablet  Commonly known as: NORCO  Take 1 tablet by mouth every 6 hours as needed for Pain for up to 3 days.      nitrofurantoin (macrocrystal-monohydrate) 100 MG capsule  Commonly known as: MACROBID  Take 1 capsule by mouth 2 times daily for 10 days     QUEtiapine 25 MG tablet  Commonly known as: SEROQUEL  Take 1 tablet by mouth nightly        CHANGE how you take these medications    sennosides-docusate sodium 8.6-50 MG tablet  Commonly known as: SENOKOT-S  Take 2 tablets by mouth daily as needed for Constipation  What changed:   · when to take this  · reasons to take this        CONTINUE taking these medications    amLODIPine 5 MG tablet  Commonly known as: NORVASC     aspirin 81 MG chewable tablet     atorvastatin 10 MG tablet  Commonly known as: LIPITOR     donepezil 5 MG tablet  Commonly known as: ARICEPT     ferrous sulfate 325 (65 Fe) MG tablet  Commonly known as: IRON 325  Take 1 tablet by mouth every other day     pantoprazole 20 MG tablet  Commonly known as: PROTONIX     sertraline 25 MG tablet  Commonly known as: ZOLOFT     Symbicort 160-4.5 MCG/ACT Aero  Generic drug: budesonide-formoterol     Ventolin  (90 Base) MCG/ACT inhaler  Generic drug: albuterol sulfate HFA     vitamin D 25 MCG (1000 UT) Tabs tablet  Commonly known as: CHOLECALCIFEROL     vitamin E 180 MG (400 UNIT) Caps capsule        STOP taking these medications    diphenhydrAMINE 25 MG tablet  Commonly known as: BENADRYL     furosemide 20 MG tablet  Commonly known as: LASIX     ibuprofen 600 MG tablet  Commonly known as: ADVIL;MOTRIN     losartan 100 MG tablet  Commonly known as: COZAAR     potassium chloride 20 MEQ extended release tablet  Commonly known as: KLOR-CON M           Where to Get Your Medications      These medications were sent to [place], [address] [facility]  [address] [facility] [address], [address] [place] [address]    Phone: [phone]   · hydrALAZINE 25 MG tablet  · nitrofurantoin (macrocrystal-monohydrate) 100 MG capsule  · QUEtiapine 25 MG tablet     You can get these medications from any pharmacy    Bring a paper prescription for each of these medications  · HYDROcodone-acetaminophen 5-325 MG per tablet          Objective Findings at Discharge:   /63   Pulse 65   Temp 98.2 °F (36.8 °C) (Oral)   Resp 18   Ht 5' 6\" (1.676 m)   Wt 223 lb 12.3 oz (101.5 kg)   SpO2 96%   PF (!) 24 L/min   BMI 36.12 kg/m²            PHYSICAL EXAM  GEN:   Awake female, alert and oriented x 2  in no apparent distress. Appears given age. HEENT: Normal   RESP: Diminished breath sounds in the bases. Symmetric chest movement while on RA  CVS: RRR, S1, S2. Left upper chest with PPM  GI/: Abdomen is soft, no organomegaly. . Bowel sounds normal, rectal exam deferred. No more suprapubic tenderness  No CVA tenderness. MSK:   No gross joint deformities. Mild tenderness, not unexpected, over left hip. SKIN:   Normal coloration, warm, dry. NEURO: Cranial nerves appear grossly intact, normal speech, no lateralizing weakness.   PSYCH:  Affect appropriate     BMP/CBC  Recent Labs     05/10/22  1019      K 3.9      CO2 25   BUN 27*   CREATININE 0.7   WBC 11.6*   HCT 31.2*          Discharge Time of 38 minutes    Electronically signed by Cierra Goncalves MD on 5/12/2022 at 11:25 AM

## 2022-05-13 LAB
GLUCOSE BLD-MCNC: 113 MG/DL (ref 70–99)
GLUCOSE BLD-MCNC: 117 MG/DL (ref 70–99)
GLUCOSE BLD-MCNC: 144 MG/DL (ref 70–99)
GLUCOSE BLD-MCNC: 191 MG/DL (ref 70–99)

## 2022-05-13 PROCEDURE — 82962 GLUCOSE BLOOD TEST: CPT

## 2022-05-13 PROCEDURE — 6370000000 HC RX 637 (ALT 250 FOR IP): Performed by: ORTHOPAEDIC SURGERY

## 2022-05-13 PROCEDURE — 2700000000 HC OXYGEN THERAPY PER DAY

## 2022-05-13 PROCEDURE — 94640 AIRWAY INHALATION TREATMENT: CPT

## 2022-05-13 PROCEDURE — 6360000002 HC RX W HCPCS: Performed by: ORTHOPAEDIC SURGERY

## 2022-05-13 PROCEDURE — 6370000000 HC RX 637 (ALT 250 FOR IP): Performed by: INTERNAL MEDICINE

## 2022-05-13 PROCEDURE — 94761 N-INVAS EAR/PLS OXIMETRY MLT: CPT

## 2022-05-13 PROCEDURE — 1200000000 HC SEMI PRIVATE

## 2022-05-13 PROCEDURE — 2580000003 HC RX 258: Performed by: ORTHOPAEDIC SURGERY

## 2022-05-13 PROCEDURE — 97530 THERAPEUTIC ACTIVITIES: CPT

## 2022-05-13 RX ADMIN — INSULIN LISPRO 1 UNITS: 100 INJECTION, SOLUTION INTRAVENOUS; SUBCUTANEOUS at 22:09

## 2022-05-13 RX ADMIN — AMLODIPINE BESYLATE 10 MG: 10 TABLET ORAL at 10:16

## 2022-05-13 RX ADMIN — PANTOPRAZOLE 20 MG: 20 TABLET, DELAYED RELEASE ORAL at 10:16

## 2022-05-13 RX ADMIN — ENOXAPARIN SODIUM 30 MG: 100 INJECTION SUBCUTANEOUS at 10:17

## 2022-05-13 RX ADMIN — DONEPEZIL HYDROCHLORIDE 5 MG: 5 TABLET, FILM COATED ORAL at 20:24

## 2022-05-13 RX ADMIN — INSULIN LISPRO 1 UNITS: 100 INJECTION, SOLUTION INTRAVENOUS; SUBCUTANEOUS at 12:29

## 2022-05-13 RX ADMIN — HYDRALAZINE HYDROCHLORIDE 25 MG: 25 TABLET ORAL at 14:59

## 2022-05-13 RX ADMIN — QUETIAPINE FUMARATE 25 MG: 25 TABLET ORAL at 20:24

## 2022-05-13 RX ADMIN — NITROFURANTOIN (MONOHYDRATE/MACROCRYSTALS) 100 MG: 75; 25 CAPSULE ORAL at 10:26

## 2022-05-13 RX ADMIN — ATORVASTATIN CALCIUM 10 MG: 10 TABLET, FILM COATED ORAL at 10:16

## 2022-05-13 RX ADMIN — Medication 400 UNITS: at 10:25

## 2022-05-13 RX ADMIN — FERROUS SULFATE TAB 325 MG (65 MG ELEMENTAL FE) 325 MG: 325 (65 FE) TAB at 10:25

## 2022-05-13 RX ADMIN — NITROFURANTOIN (MONOHYDRATE/MACROCRYSTALS) 100 MG: 75; 25 CAPSULE ORAL at 20:24

## 2022-05-13 RX ADMIN — Medication 1000 UNITS: at 10:16

## 2022-05-13 RX ADMIN — HYDRALAZINE HYDROCHLORIDE 25 MG: 25 TABLET ORAL at 20:24

## 2022-05-13 RX ADMIN — SODIUM CHLORIDE, PRESERVATIVE FREE 10 ML: 5 INJECTION INTRAVENOUS at 20:25

## 2022-05-13 RX ADMIN — BUDESONIDE AND FORMOTEROL FUMARATE DIHYDRATE 2 PUFF: 160; 4.5 AEROSOL RESPIRATORY (INHALATION) at 08:12

## 2022-05-13 RX ADMIN — HYDRALAZINE HYDROCHLORIDE 25 MG: 25 TABLET ORAL at 05:15

## 2022-05-13 ASSESSMENT — PAIN SCALES - GENERAL
PAINLEVEL_OUTOF10: 0
PAINLEVEL_OUTOF10: 2
PAINLEVEL_OUTOF10: 0

## 2022-05-13 ASSESSMENT — PAIN SCALES - WONG BAKER
WONGBAKER_NUMERICALRESPONSE: 0

## 2022-05-13 NOTE — PLAN OF CARE
Problem: Discharge Planning  Goal: Discharge to home or other facility with appropriate resources  5/13/2022 0054 by Pk Stallworth RN  Outcome: Progressing  5/12/2022 1155 by Breann Ortega LPN  Outcome: Progressing     Problem: Pain  Goal: Verbalizes/displays adequate comfort level or baseline comfort level  5/13/2022 0054 by Pk Stallworth RN  Outcome: Progressing  5/12/2022 1155 by Breann Ortega LPN  Outcome: Progressing     Problem: Chronic Conditions and Co-morbidities  Goal: Patient's chronic conditions and co-morbidity symptoms are monitored and maintained or improved  5/13/2022 0054 by Pk Stallworth RN  Outcome: Progressing  5/12/2022 1155 by Breann Ortega LPN  Outcome: Progressing     Problem: Safety - Adult  Goal: Free from fall injury  5/13/2022 0054 by Pk Stallworth RN  Outcome: Progressing  5/12/2022 1155 by Breann Ortega LPN  Outcome: Progressing     Problem: Skin/Tissue Integrity  Goal: Absence of new skin breakdown  Description: 1. Monitor for areas of redness and/or skin breakdown  2. Assess vascular access sites hourly  3. Every 4-6 hours minimum:  Change oxygen saturation probe site  4. Every 4-6 hours:  If on nasal continuous positive airway pressure, respiratory therapy assess nares and determine need for appliance change or resting period.   5/13/2022 0054 by Pk Stallworth RN  Outcome: Progressing  5/12/2022 1155 by Breann Ortega LPN  Outcome: Progressing     Problem: ABCDS Injury Assessment  Goal: Absence of physical injury  5/13/2022 0054 by Pk Stallworth RN  Outcome: Progressing  5/12/2022 1155 by Breann Ortega LPN  Outcome: Progressing

## 2022-05-13 NOTE — CARE COORDINATION
Notified by Jennifer Masters [facility] that she has spoken with Nathaly Fofana at [place] [address] and precert has been approved. Kailyn Bhardwaj stated that Nathaly Fofana had questions regarding sitter for patient. Phoned and spoke with patient's nurse [place] regarding sitter. He confirmed that the sitter has been removed more that 24 hours ago but stated that patient has a CHERYL. Phoned and spoke with Nathaly Fofana at [place] [address] to provide update. She confirmed precert has been approved but stated that the CHERYL counts as a sitter and patient will have to not have the [address] for 24 hours prior to admission . She added precert is good though tomorrow. Notified patient's nurse [place] to see if CHERYL can be discontinued and plan for discharge to SNF tomorrow.

## 2022-05-13 NOTE — PLAN OF CARE
Problem: Discharge Planning  Goal: Discharge to home or other facility with appropriate resources  5/13/2022 1219 by Vinita Madsen LPN  Outcome: Progressing  5/13/2022 0054 by Thais Alva RN  Outcome: Progressing     Problem: Pain  Goal: Verbalizes/displays adequate comfort level or baseline comfort level  5/13/2022 1219 by Vinita Madsen LPN  Outcome: Progressing  5/13/2022 0054 by Thais Alva RN  Outcome: Progressing     Problem: Chronic Conditions and Co-morbidities  Goal: Patient's chronic conditions and co-morbidity symptoms are monitored and maintained or improved  5/13/2022 1219 by Vinita Madsen LPN  Outcome: Progressing  5/13/2022 0054 by Thais Alva RN  Outcome: Progressing     Problem: Safety - Adult  Goal: Free from fall injury  5/13/2022 1219 by Vinita Madsen LPN  Outcome: Progressing  5/13/2022 0054 by Thais Alva RN  Outcome: Progressing     Problem: Skin/Tissue Integrity  Goal: Absence of new skin breakdown  Description: 1. Monitor for areas of redness and/or skin breakdown  2. Assess vascular access sites hourly  3. Every 4-6 hours minimum:  Change oxygen saturation probe site  4. Every 4-6 hours:  If on nasal continuous positive airway pressure, respiratory therapy assess nares and determine need for appliance change or resting period.   5/13/2022 1219 by Vinita Madsen LPN  Outcome: Progressing  5/13/2022 0054 by Thais Alva RN  Outcome: Progressing     Problem: ABCDS Injury Assessment  Goal: Absence of physical injury  5/13/2022 1219 by Vinita Madsen LPN  Outcome: Progressing  5/13/2022 0054 by Thais Alva RN  Outcome: Progressing

## 2022-05-13 NOTE — PROGRESS NOTES
Comprehensive Nutrition Assessment    Type and Reason for Visit:  Reassess    Nutrition Recommendations/Plan:   1. Continue easy to chew diet   2. Will offer oral nutrition supplement  3. Assist with meals as needed, encourage intake   4. Will continue to follow up during stay      Malnutrition Assessment:  Malnutrition Status: At risk for malnutrition (Comment) (05/13/22 1513)    Context:  Acute Illness       Nutrition Assessment:    Admit with hip fracture. Able to advance to easy to chew diet as per speech therapy. Meal intake per records this week at few meals, 25-50%, decreasing intake. Moderate nutrition risk at this time. Nutrition Related Findings:    asleep in bed, covers up to face, hx dementia, DM meds noted: iron, vitamin D, E Wound Type: Surgical Incision       Current Nutrition Intake & Therapies:    Average Meal Intake: 26-50%  Average Supplements Intake: None Ordered  ADULT DIET; Easy to Chew    Anthropometric Measures:  Height: 5' 6\" (167.6 cm)  Ideal Body Weight (IBW): 130 lbs (59 kg)    Admission Body Weight: 190 lb 7.6 oz (86.4 kg)  Current Body Weight: 216 lb 14.9 oz (98.4 kg), 166.9 % IBW.  Weight Source: Bed Scale  Current BMI (kg/m2): 35  Usual Body Weight: 200 lb (90.7 kg) (per hx)  % Weight Change (Calculated): 8.5  Weight Adjustment For: No Adjustment                 BMI Categories: Obese Class 2 (BMI 35.0 -39.9)    Estimated Daily Nutrient Needs:  Energy Requirements Based On: Formula  Weight Used for Energy Requirements: Current  Energy (kcal/day): 0457-3918  Weight Used for Protein Requirements: Ideal  Protein (g/day): 65-76 (1.1-1.3 g/kg)     Fluid (ml/day):   1700     Nutrition Diagnosis:   · Inadequate oral intake related to increase demand for energy/nutrients as evidenced by intake 26-50%      Nutrition Interventions:   Food and/or Nutrient Delivery: Continue Current Diet,Start Oral Nutrition Supplement  Nutrition Education/Counseling: Education not appropriate  Coordination of Nutrition Care: Continue to monitor while inpatient,Feeding Assistance/Environment Change       Goals:     Goals: PO intake 50% or greater,by next RD assessment       Nutrition Monitoring and Evaluation:   Behavioral-Environmental Outcomes: None Identified  Food/Nutrient Intake Outcomes: Food and Nutrient Intake,Supplement Intake  Physical Signs/Symptoms Outcomes: Biochemical Data,Meal Time Behavior,Skin,Weight,Nutrition Focused Physical Findings    Discharge Planning:    Continue current diet     Thelma Rizzo RD, LD  Contact: [phone]

## 2022-05-13 NOTE — CONSULTS
Physical Therapy Treatment Note  Name: Maty Dan MRN: [id number] :   1935   Date:  2022   Admission Date: 2022 Room:  [address] [place] [address]   Restrictions/Precautions:  Restrictions/Precautions  Restrictions/Precautions: Fall Risk,General Precautions,Weight Bearing   Lower Extremity Weight Bearing Restrictions  Left Lower Extremity Weight Bearing: Weight Bearing As Tolerated   Other: 4 L of nc O2 in place on eval   Chart reviewed, patient cleared for activity. Subjective:  Patient states:  Amenable to therapy visit. Interested in trying. Thankful for visit. States fatigue with sitting. Confirms fall. Enjoyed care today. Pain:   Location, Type, Intensity (0/10 to 10/10):  L hip 0/10 to 4/10, dependent on position  Objective:    Observation:  Alert, oriented, pleasant, participatory. Oriented to hx of fall, to d/c plan, and to activity intolerance. Impaired LLE AROM for mobility purposes. Impaired BUE functional reach. Reasonable   Therapeutic Activity Training:   Cues given for safety, sequence, UE/LE placement, awareness, and balance. Bed mobility:  Mod A scoot and roll, multiple reps. Sup-sit:  Mod A x1 for long-duration effort to transition positions, multiple reps. Seated scooting with mod A x1 at EOB. Sit-stand:  Unable, one trial not successful. Assessment / Impression:    Doing well today. Seems very appropriate for care. Engaged in service. Appears cognitively in good status this morning. Highly appropriate for skilled care at next placement such as SNF. Tolerance of treatment:  Very good   Adverse Reaction: none    Plan for Next Session:    Train and educate using established plan to improve functional mobility, safety, and independence. Time in:  1045  Time out:  1115  Timed treatment minutes:  30  Total treatment time:  30  Electronically signed by:     Lio Mendoza, PT  2022, 12:59 PM

## 2022-05-13 NOTE — PROGRESS NOTES
[] Ambulation   GI Prophylaxis [] PPI,  [] H2 Blocker,  [] Carafate,  [x] Diet/Tube Feeds   Code Status DNR-CC   MDM [x] Low, [] Moderate,[]  High       History of Present Illness:     Chief Complaint: Closed left hip fracture, initial encounter ([facility]     Selena Cisse is a 81 y.o.  female  who presents with fall leading to left hip fracture. 5/10/2022: Patient is seen and examined, confused, does not know why she is here in the hospital.  She knows that she is in the hospital.  She knows the year correctly but thinks we are still in April. She thinks today is Monday instead of Tuesday. 5/11/2022: Patient is seen and examined, she recognizes me from yesterday. She again knows that she is in the hospital and she knows the year correctly. Has no new complaints. Had a bowel movement yesterday. She still has a therapeutic  at bedside. 5/13/2022: Patient is seen and examined, had her hospital gown off and was complaining of being cold. She could not figure out how to we 8. I assisted patient. She is also waiting on assistance with her breakfast.    Ten point ROS reviewed, negative, unless as noted above    Objective: Intake/Output Summary (Last 24 hours) at 5/13/2022 1233  Last data filed at 5/13/2022 0548  Gross per 24 hour   Intake 100 ml   Output 900 ml   Net -800 ml        Vitals:   Vitals:    05/12/22 2130 05/13/22 0257 05/13/22 0812 05/13/22 1014   BP: (!) 143/61 (!) 147/75  (!) 123/59   Pulse: 86 62  62   Resp: 18 17 18 17   Temp: 98.3 °F (36.8 °C) 97.6 °F (36.4 °C)  97.7 °F (36.5 °C)   TempSrc: Oral Axillary  Oral   SpO2: 92% 96%  94%   Weight:  216 lb 14.9 oz (98.4 kg)     Height:       PF:            Physical Exam:   GEN: Awake female, alert and oriented x 2  in no apparent distress. Appears given age. HEENT: Normal   RESP: Diminished breath sounds in the bases. Symmetric chest movement while on RA  CVS: RRR, S1, S2.   Left upper chest with PPM  GI/: Abdomen is soft, no organomegaly. . Bowel sounds normal, rectal exam deferred. No more suprapubic tenderness  No CVA tenderness. MSK: No gross joint deformities. Mild tenderness, not unexpected, over left hip. SKIN: Normal coloration, warm, dry. NEURO: Cranial nerves appear grossly intact, normal speech, no lateralizing weakness.   PSYCH:  Affect appropriate     Medications:   Medications:    nitrofurantoin (macrocrystal-monohydrate)  100 mg Oral 2 times per day    ceFAZolin  2,000 mg IntraVENous Q8H    hydrALAZINE  25 mg Oral 3 times per day    QUEtiapine  25 mg Oral Nightly    enoxaparin  30 mg SubCUTAneous Daily    atorvastatin  10 mg Oral Daily    donepezil  5 mg Oral Nightly    ferrous sulfate  325 mg Oral Every Other Day    pantoprazole  20 mg Oral Daily    Vitamin D  1,000 Units Oral Daily    vitamin E  400 Units Oral Daily    sodium chloride flush  5-40 mL IntraVENous 2 times per day    amLODIPine  10 mg Oral Daily    insulin lispro  0-12 Units SubCUTAneous TID WC    insulin lispro  0-6 Units SubCUTAneous Nightly      Infusions:    sodium chloride 25 mL/hr at 05/12/22 1717    dextrose       PRN Meds: budesonide-formoterol, 2 puff, BID PRN  HYDROcodone-acetaminophen, 1 tablet, Q6H PRN  hydrALAZINE (APRESOLINE) ivpb, 10 mg, Q6H PRN  acetaminophen, 650 mg, Q4H PRN  bisacodyl, 5 mg, Daily PRN  acetaminophen, 650 mg, Q6H PRN   Or  acetaminophen, 650 mg, Q6H PRN  albuterol sulfate HFA, 2 puff, Q6H PRN  sodium chloride flush, 10 mL, PRN  sodium chloride, , PRN  ondansetron, 4 mg, Q8H PRN   Or  ondansetron, 4 mg, Q6H PRN  polyethylene glycol, 17 g, Daily PRN  dextrose, 12.5 g, PRN  glucagon (rDNA), 1 mg, PRN  dextrose, 100 mL/hr, PRN  glucose, 16 g, PRN          Electronically signed by Luis Carlos Fuentes MD on 5/13/2022 at 12:33 PM

## 2022-05-14 VITALS
SYSTOLIC BLOOD PRESSURE: 120 MMHG | WEIGHT: 216 LBS | HEART RATE: 64 BPM | HEIGHT: 66 IN | TEMPERATURE: 98.1 F | DIASTOLIC BLOOD PRESSURE: 73 MMHG | OXYGEN SATURATION: 93 % | RESPIRATION RATE: 15 BRPM | BODY MASS INDEX: 34.72 KG/M2

## 2022-05-14 LAB
GLUCOSE BLD-MCNC: 129 MG/DL (ref 70–99)
GLUCOSE BLD-MCNC: 130 MG/DL (ref 70–99)

## 2022-05-14 PROCEDURE — 6370000000 HC RX 637 (ALT 250 FOR IP): Performed by: INTERNAL MEDICINE

## 2022-05-14 PROCEDURE — 6370000000 HC RX 637 (ALT 250 FOR IP): Performed by: ORTHOPAEDIC SURGERY

## 2022-05-14 PROCEDURE — 6360000002 HC RX W HCPCS: Performed by: ORTHOPAEDIC SURGERY

## 2022-05-14 PROCEDURE — 97110 THERAPEUTIC EXERCISES: CPT

## 2022-05-14 PROCEDURE — 6360000002 HC RX W HCPCS: Performed by: INTERNAL MEDICINE

## 2022-05-14 PROCEDURE — 76937 US GUIDE VASCULAR ACCESS: CPT

## 2022-05-14 PROCEDURE — 82962 GLUCOSE BLOOD TEST: CPT

## 2022-05-14 PROCEDURE — 2580000003 HC RX 258: Performed by: ORTHOPAEDIC SURGERY

## 2022-05-14 PROCEDURE — 94761 N-INVAS EAR/PLS OXIMETRY MLT: CPT

## 2022-05-14 PROCEDURE — 97530 THERAPEUTIC ACTIVITIES: CPT

## 2022-05-14 RX ADMIN — ATORVASTATIN CALCIUM 10 MG: 10 TABLET, FILM COATED ORAL at 09:18

## 2022-05-14 RX ADMIN — CEFAZOLIN SODIUM 2000 MG: 2 INJECTION, SOLUTION INTRAVENOUS at 16:18

## 2022-05-14 RX ADMIN — HYDRALAZINE HYDROCHLORIDE 25 MG: 25 TABLET ORAL at 05:32

## 2022-05-14 RX ADMIN — AMLODIPINE BESYLATE 10 MG: 10 TABLET ORAL at 09:18

## 2022-05-14 RX ADMIN — CEFAZOLIN SODIUM 2000 MG: 2 INJECTION, SOLUTION INTRAVENOUS at 00:05

## 2022-05-14 RX ADMIN — PANTOPRAZOLE 20 MG: 20 TABLET, DELAYED RELEASE ORAL at 09:17

## 2022-05-14 RX ADMIN — Medication 1000 UNITS: at 09:18

## 2022-05-14 RX ADMIN — CEFAZOLIN SODIUM 2000 MG: 2 INJECTION, SOLUTION INTRAVENOUS at 10:43

## 2022-05-14 RX ADMIN — SODIUM CHLORIDE, PRESERVATIVE FREE 10 ML: 5 INJECTION INTRAVENOUS at 09:19

## 2022-05-14 RX ADMIN — HYDRALAZINE HYDROCHLORIDE 25 MG: 25 TABLET ORAL at 13:42

## 2022-05-14 RX ADMIN — HYDROCODONE BITARTRATE AND ACETAMINOPHEN 1 TABLET: 5; 325 TABLET ORAL at 00:05

## 2022-05-14 RX ADMIN — ENOXAPARIN SODIUM 30 MG: 100 INJECTION SUBCUTANEOUS at 09:18

## 2022-05-14 RX ADMIN — Medication 400 UNITS: at 09:18

## 2022-05-14 RX ADMIN — NITROFURANTOIN (MONOHYDRATE/MACROCRYSTALS) 100 MG: 75; 25 CAPSULE ORAL at 09:17

## 2022-05-14 RX ADMIN — SODIUM CHLORIDE: 9 INJECTION, SOLUTION INTRAVENOUS at 10:42

## 2022-05-14 ASSESSMENT — PAIN DESCRIPTION - ORIENTATION: ORIENTATION: LEFT

## 2022-05-14 ASSESSMENT — PAIN DESCRIPTION - DESCRIPTORS: DESCRIPTORS: ACHING

## 2022-05-14 ASSESSMENT — PAIN SCALES - WONG BAKER
WONGBAKER_NUMERICALRESPONSE: 0

## 2022-05-14 ASSESSMENT — PAIN SCALES - GENERAL
PAINLEVEL_OUTOF10: 8
PAINLEVEL_OUTOF10: 0

## 2022-05-14 ASSESSMENT — PAIN DESCRIPTION - LOCATION: LOCATION: ANKLE;HIP;KNEE

## 2022-05-14 NOTE — PROGRESS NOTES
Physical Therapy    Physical Therapy Treatment Note  Name: Kinsey Sullivan MRN: [id number] :   1935   Date:  2022   Admission Date: 2022 Room:  [address] [place] [address]   Restrictions/Precautions:  Restrictions/Precautions  Restrictions/Precautions: Fall Risk,General Precautions,Weight Bearing   Lower Extremity Weight Bearing Restrictions  Left Lower Extremity Weight Bearing: Weight Bearing As Tolerated Upper Extremity Weight Bearing Restrictions  Other: 4 L of nc O2 in place on eval   Communication with other providers:  Nurse reports ok to see patient and advised she is doing better cognitively today   Subjective:  Patient states:  '' thank you for helping an old woman''   Pain:   Location, Type, Intensity (0/10 to 10/10): Denies at rest. Does not quantify with activity but c/o pain. Objective:    Observation:  Pt supine in bed   Treatment, including education/measures:  Pt presented in room and was supine in bed. Agreeable to therapy. She was able to perform bed mobility, rolling with modA for LE. She was able to perform supine> EOB transfer with elevated HOB and modA. She was able to scoot to the edge of the bed with Frederick HHA and VC. Sitting EOB she completed ankle pumps and heel raises aprox 2 sets 15xea. She was able to perform 4 STS from elevated EOB with ModA. In standing, she was cued to distribute weight through UE to decrease pain in LE. She required long rest breaks between bouts of standing and sometimes became tearful, requiring education and guided breathing to recover. She attempted SPT to recliner with maxA x2. 2 attempts, but d/c due to safety concerns. She returned to supine with MaxA x2 for EOB> supine transfer. STNA assisted. She was scooted up in bed with maxA x2. In supine, she completed heel slides with AAROM for LLE, 10x ble. She completed knee flexion with AROM LLE, 10x BLE, and forward reaching 2 sets 5xea with rest breaks to recover from fatigue.  Upon completion she was left supine in bed with all needs met.    Assessment / Impression:      Patient's tolerance of treatment:  fair   Adverse Reaction: pain with activity   Significant change in status and impact:  none  Barriers to improvement:  Cognition, pain, weakness   Plan for Next Session:     continue working towards all goals in POC   Time in:  3:13  Time out:  3:55  Timed treatment minutes:  41  Total treatment time:  41    Previously filed items:  Social/Functional History  Additional Comments: disoriented, confused, answers changing during assessment ; states was ind w/ mobility w/o device prev, assist w/ [facility]'s home mgmt; from [address] [place] [address], will need further info clarified 2/2 impaired cog  Patient Goals   Patient goals : return home  Long Term Goals  Time Frame for Long term goals : 1 week  Long term goal 1: pt will complete bed mobility at mod A  Long term goal 2: pt will complete transfers at min A  Long term goal 3: pt will ambulate 25 ft using FWW at min A       Electronically signed by:    Janet Dennis PTA  5/14/2022, 4:07 PM

## 2022-05-14 NOTE — PLAN OF CARE
Problem: Discharge Planning  Goal: Discharge to home or other facility with appropriate resources  5/13/2022 2146 by Alfred Smith RN  Outcome: Progressing  5/13/2022 1219 by Colin Alicea LPN  Outcome: Progressing

## 2022-05-14 NOTE — PROGRESS NOTES
Hospitalist Progress Note      Name:  Olga Arroyo /Age/Sex: 1935  (81 y.o. female)   MRN & CSN:  [id number] & [id number] Admission Date/Time: 2022 11:37 AM   Location:  [facility] PCP: Erasmo Irvin, [address] [place] [address] Day: 15  Discharge barrier/Reason for continued hospitalization: Awaiting ECF precertification. Assessment and Plan:   Olga Arroyo is a 81 y.o. female with a past medical history of dementia, CHB s/p PPM, Hypertension, chronic diastolic CHF, dyslipidemia, hypertension  female  who presented to the ED on 2022 with fall when ambulating leading to Closed left hip fracture, initial encounter ([facility]    1. Left hip fracture: S/p left hip IM nail on 2022  Orthopedics team has now signed off. .  Appreciate assistance. 2. MDRO E. coli: Likely leading to change in mental status  IV antibiotics now transitioned to p.o. Continue Macrobid twice daily to complete 10 days of antibiotic therapy. End date 2022    3. BARRY: Not POA. Due to inadequate p.o. intake. Now resolved. Cr was 0.8 on arrival, peaked at 2.1, down trended to 0.7. Continue to avoid nephrotoxic agents. 4.  Acute postoperative blood loss anemia: Expected  Hgb up about 10 g, down to around 8 g.    5.  Acute metabolic encephalopathy: In a patient with baseline dementia  Acute encephalopathy now resolved. Acute encephalopathy was due to BARRY and UTI. 6.  Hypertension: Continue hydralazine 25 mg 3 times daily, amlodipine 10 mg daily  7. Type 2 diabetes: Continue moderate dose insulin correctional scale. 8. Dyslipidemia:  Continue lipitor  9. Chronic diastolic CHF: Compensated. 10.  Dementia: No behavioral abnormalities at this time. Discussed with bedside RN, trial off therapeutic  today. Continue Aricept 5 mg qHS, seroquel 25 mg qHS   11. Goals of care: DNR CC. See significant event progress note on 5/10/2022.     Diet ADULT DIET; Easy to Chew  ADULT ORAL NUTRITION SUPPLEMENT; given age. HEENT: Normal   RESP: Diminished breath sounds in the bases. Symmetric chest movement while on RA  CVS: RRR, S1, S2. Left upper chest with PPM  GI/: Abdomen is soft, no organomegaly. . Bowel sounds normal, rectal exam deferred. No more suprapubic tenderness  No CVA tenderness. MSK: No gross joint deformities. Mild tenderness, not unexpected, over left hip. SKIN: Normal coloration, warm, dry. Plantar aspect of both feet with calluses  NEURO: Cranial nerves appear grossly intact, normal speech, no lateralizing weakness.   PSYCH:  Affect appropriate     Medications:   Medications:    nitrofurantoin (macrocrystal-monohydrate)  100 mg Oral 2 times per day    ceFAZolin  2,000 mg IntraVENous Q8H    hydrALAZINE  25 mg Oral 3 times per day    QUEtiapine  25 mg Oral Nightly    enoxaparin  30 mg SubCUTAneous Daily    atorvastatin  10 mg Oral Daily    donepezil  5 mg Oral Nightly    ferrous sulfate  325 mg Oral Every Other Day    pantoprazole  20 mg Oral Daily    Vitamin D  1,000 Units Oral Daily    vitamin E  400 Units Oral Daily    sodium chloride flush  5-40 mL IntraVENous 2 times per day    amLODIPine  10 mg Oral Daily    insulin lispro  0-12 Units SubCUTAneous TID WC    insulin lispro  0-6 Units SubCUTAneous Nightly      Infusions:    sodium chloride 25 mL/hr at 05/14/22 1042    dextrose       PRN Meds: budesonide-formoterol, 2 puff, BID PRN  HYDROcodone-acetaminophen, 1 tablet, Q6H PRN  hydrALAZINE (APRESOLINE) ivpb, 10 mg, Q6H PRN  acetaminophen, 650 mg, Q4H PRN  bisacodyl, 5 mg, Daily PRN  acetaminophen, 650 mg, Q6H PRN   Or  acetaminophen, 650 mg, Q6H PRN  albuterol sulfate HFA, 2 puff, Q6H PRN  sodium chloride flush, 10 mL, PRN  sodium chloride, , PRN  ondansetron, 4 mg, Q8H PRN   Or  ondansetron, 4 mg, Q6H PRN  polyethylene glycol, 17 g, Daily PRN  dextrose, 12.5 g, PRN  glucagon (rDNA), 1 mg, PRN  dextrose, 100 mL/hr, PRN  glucose, 16 g, PRN          Electronically signed by Madhav Henderson MD on 5/14/2022 at 11:38 AM

## 2022-05-15 NOTE — DISCHARGE SUMMARY
Date of service 2022    Discharge Summary    Name:  Kinsey Sullivan /Age/Sex: 1935  (81 y.o. female)   MRN & CSN:  [id number] & [id number] Admission Date/Time: 2022   Attending:  No att. providers found Discharging Physician: Luis Carlos Fuentes MD     Hospital Course:   Kinsey Sullivan is a 81 y.o.  female with a past medical history of dementia, CHB s/p PPM, Hypertension, chronic diastolic CHF, dyslipidemia, hypertension  female  who presented to the ED on 2022 with fall when ambulating leading to Closed left hip fracture  Patient underwent left hip IM nail on 2022. During hospital course, patient developed confusion, poor oral intake leading to acute kidney injury. Creatinine peaked at 2.1. Patient was managed for prerenal acute kidney injury with IV fluids and creatinine is now down trended to baseline of 0.7. Patient did have acute postoperative blood loss anemia that was expected due to surgery. Hgb stable above 8 g. Has not needed to be transfused. Patient had acute metabolic encephalopathy superimposed on baseline dementia likely precipitated by BARRY and UTI. Urine cultures grew MDRO E. coli. Patient was started on IV cefazolin and has been transitioned to Macrobid to complete 10 days of targeted antibiotic treatment. Goals of care discussion was had with patient and family and patient opted to be DNR CC at this time. The patient/family expressed appropriate understanding of and agreement with the discharge recommendations, medications, and plan.      Consults this admission:  IP CONSULT TO ORTHOPEDIC SURGERY  IP CONSULT TO CASE MANAGEMENT  IP CONSULT TO IV TEAM  IP CONSULT TO IV TEAM    Discharge Instruction:   Follow up appointments: Orthopedics  Primary care physician:  within 2 weeks    Diet:  regular diet   Activity: activity as tolerated  Disposition: Discharged to:   []Home, []HHC, [x]SNF, []Acute Rehab, []Hospice   Condition on discharge: Stable    Discharge Medications:        Medication List      START taking these medications    hydrALAZINE 25 MG tablet  Commonly known as: APRESOLINE  Take 1 tablet by mouth every 8 hours     HYDROcodone-acetaminophen 5-325 MG per tablet  Commonly known as: NORCO  Take 1 tablet by mouth every 6 hours as needed for Pain for up to 3 days.      nitrofurantoin (macrocrystal-monohydrate) 100 MG capsule  Commonly known as: MACROBID  Take 1 capsule by mouth 2 times daily for 10 days     QUEtiapine 25 MG tablet  Commonly known as: SEROQUEL  Take 1 tablet by mouth nightly        CHANGE how you take these medications    sennosides-docusate sodium 8.6-50 MG tablet  Commonly known as: SENOKOT-S  Take 2 tablets by mouth daily as needed for Constipation  What changed:   · when to take this  · reasons to take this        CONTINUE taking these medications    amLODIPine 5 MG tablet  Commonly known as: NORVASC     aspirin 81 MG chewable tablet     atorvastatin 10 MG tablet  Commonly known as: LIPITOR     donepezil 5 MG tablet  Commonly known as: ARICEPT     ferrous sulfate 325 (65 Fe) MG tablet  Commonly known as: IRON 325  Take 1 tablet by mouth every other day     pantoprazole 20 MG tablet  Commonly known as: PROTONIX     sertraline 25 MG tablet  Commonly known as: ZOLOFT     Symbicort 160-4.5 MCG/ACT Aero  Generic drug: budesonide-formoterol     Ventolin  (90 Base) MCG/ACT inhaler  Generic drug: albuterol sulfate HFA     vitamin D 25 MCG (1000 UT) Tabs tablet  Commonly known as: CHOLECALCIFEROL     vitamin E 180 MG (400 UNIT) Caps capsule        STOP taking these medications    diphenhydrAMINE 25 MG tablet  Commonly known as: BENADRYL     furosemide 20 MG tablet  Commonly known as: LASIX     ibuprofen 600 MG tablet  Commonly known as: ADVIL;MOTRIN     losartan 100 MG tablet  Commonly known as: COZAAR     potassium chloride 20 MEQ extended release tablet  Commonly known as: KLOR-CON M           Where to Get Your Medications These medications were sent to [facility], [address] [place] [address] And [facility] [address], [address] [facility]    Phone: [phone]   · hydrALAZINE 25 MG tablet  · nitrofurantoin (macrocrystal-monohydrate) 100 MG capsule  · QUEtiapine 25 MG tablet     You can get these medications from any pharmacy    Bring a paper prescription for each of these medications  · HYDROcodone-acetaminophen 5-325 MG per tablet          Objective Findings at Discharge:   /73   Pulse 64   Temp 98.1 °F (36.7 °C) (Oral)   Resp 15   Ht 5' 6\" (1.676 m)   Wt 216 lb (98 kg)   SpO2 93%   PF (!) 24 L/min   BMI 34.86 kg/m²            PHYSICAL EXAM  GEN:   Awake female, alert and oriented x 2  in no apparent distress. Appears given age. HEENT: Normal   RESP: Diminished breath sounds in the bases. Symmetric chest movement while on RA  CVS: RRR, S1, S2. Left upper chest with PPM  GI/: Abdomen is soft, no organomegaly. . Bowel sounds normal, rectal exam deferred. No more suprapubic tenderness  No CVA tenderness. MSK:   No gross joint deformities. Mild tenderness, not unexpected, over left hip. SKIN:   Normal coloration, warm, dry. NEURO: Cranial nerves appear grossly intact, normal speech, no lateralizing weakness. PSYCH:  Affect appropriate     BMP/CBC  No results for input(s): NA, K, CL, CO2, BUN, CREATININE, GLU, WBC, HEMOGLOBIN, HCT, PLT in the last 72 hours.     Discharge Time of 38 minutes    Electronically signed by Alexia Hardin MD on 5/15/2022 at 7:14 AM

## 2022-05-31 ENCOUNTER — OFFICE VISIT (OUTPATIENT)
Dept: ORTHOPEDIC SURGERY | Age: 87
End: 2022-05-31

## 2022-05-31 DIAGNOSIS — S72.002A CLOSED LEFT HIP FRACTURE, INITIAL ENCOUNTER (HCC): Primary | ICD-10-CM

## 2022-05-31 PROCEDURE — 99024 POSTOP FOLLOW-UP VISIT: CPT | Performed by: ORTHOPAEDIC SURGERY

## 2022-05-31 NOTE — PROGRESS NOTES
5/31/2022   Chief Complaint   Patient presents with    Post-Op Check     left femur DOS 5/1/22        History of Present Illness:                             Niecy Raygoza is a 81 y.o. female who returns today for follow-up of her left hip fracture. She underwent intramedullary nailing of an intertrochanteric fracture on 5/1/2022. She has been at the nursing facility and feels that she is healing reasonably well. She has been participating with therapy but has not made much progress as of yet. History is difficult to interpret due to her underlying dementia. Patient presents to the office today for post op check of the left femur DOS 5/1/22. Pt states she has been walking but will have increase pain. Pt states she has been working with therapy which has helped with her pain. Medical History  Patient's medications, allergies, past medical, surgical, social and family histories were reviewed and updated as appropriate. Review of Systems                                            Examination:  General Exam:  Vitals: There were no vitals taken for this visit. Physical Exam     Right lower extremity:  Well-healed surgical scars over the lateral aspect of the thigh. Discomfort present at the hip and thigh with passive range of motion but leg moves well with no instability during passive rotational movements of the hip in flexion extension of the knee. There is moderate pitting edema in the lower extremity from positioning. No erythema, drainage, or induration at the healing surgical sites. Calf is soft and nontender.     Diagnostic testing:  X-rays reviewed in office, I independently reviewed the films in the office today:         Office Procedures:  Orders Placed This Encounter   Procedures    External Referral To Physical Therapy     Referral Priority:   Routine     Referral Type:   Eval and Treat     Referral Reason:   Specialty Services Required     Requested Specialty:   Physical Therapist     Number of Visits Requested:   1    Remove staples       Assessment and Plan  1. status post left hip open reduction and internal fixation with intramedullary nail    We were unable to obtain x-rays today because we did not have an x-ray tech. We removed most of her staples but were unable to get her positioned adequately to remove the last couple of staples at the proximal incision posteriorly because of the position of her in the wheelchair. She was unable to stand and mobilize effectively to have the staples removed. We have sent an order to the nursing facility to remove the remaining staples when she is back in her bed. I have given the nursing facility instructions to advance her activities as tolerated and she will be weightbearing as tolerated. I explained to the patient and her family member that she may have some difficulty with rehabilitation given her underlying deconditioning and mental status. Proceed with activities at the direction of therapy as tolerated. Follow-up in 6 weeks for x-rays and a check of her progress.         Electronically signed by Nupur Parks MD on 5/31/2022 at 3:52 PM

## 2022-05-31 NOTE — PROGRESS NOTES
Patient presents to the office today for post op check of the left femur DOS 5/1/22. Pt states she has been walking but will have increase pain. Pt states she has been working with therapy which has helped with her pain.

## 2022-05-31 NOTE — PATIENT INSTRUCTIONS
Continue weight-bearing as tolerated. Continue range of motion exercises as instructed. Ice and elevate as needed. Tylenol or Motrin for pain.   Some staples removed  Please remove staples for the hip area(was not able to access the patients hip in wheelchair)  Please have xray of the left femur completed and sent to our office   Follow up in 6 weeks   Therapy ordered today

## (undated) DEVICE — ELECTRODE ES L2.75IN S STL INSUL BLDE W/ SL EDGE

## (undated) DEVICE — GAUZE,SPONGE,4"X4",16PLY,XRAY,STRL,LF: Brand: MEDLINE

## (undated) DEVICE — DECANTER FLD 9IN ST BG FOR ASEP TRNSF OF FLD

## (undated) DEVICE — GOWN,SIRUS,POLYRNF,BRTHSLV,XLN/XL,20/CS: Brand: MEDLINE

## (undated) DEVICE — BIT DRL L145MM DIA4.2MM ST 3 FLUT NDL PNT QUIK CPL FOR FEM

## (undated) DEVICE — ELECTRODE ES AD CRDLSS PT RET REM POLYHESIVE

## (undated) DEVICE — STAPLER SKIN H3.9MM WIRE DIA0.58MM CRWN 6.9MM 35 STPL ROT

## (undated) DEVICE — GLOVE ORANGE PI 8   MSG9080

## (undated) DEVICE — TUBING, SUCTION, 9/32" X 10', STRAIGHT: Brand: MEDLINE

## (undated) DEVICE — COTTON UNDERCAST PADDING,CRIMPED FINISH: Brand: WEBRIL

## (undated) DEVICE — SUTURE VCRL SZ 2-0 L18IN ABSRB UD CT-1 L36MM 1/2 CIR J839D

## (undated) DEVICE — SUTURE VCRL SZ 2-0 L18IN ABSRB VLT L26MM SH 1/2 CIR J775D

## (undated) DEVICE — TOWEL,OR,DSP,ST,BLUE,STD,6/PK,12PK/CS: Brand: MEDLINE

## (undated) DEVICE — PADDING,UNDERCAST,COTTON, 4"X4YD STERILE: Brand: MEDLINE

## (undated) DEVICE — COUNTER NDL 30 COUNT FOAM STRP SGL MAG

## (undated) DEVICE — SYRINGE IRRIG 60ML SFT PLIABLE BLB EZ TO GRP 1 HND USE W/

## (undated) DEVICE — BANDAGE,GAUZE,BULKEE II,4.5"X4.1YD,STRL: Brand: MEDLINE

## (undated) DEVICE — MARKER SURG SKIN UTIL REGULAR/FINE 2 TIP W/ RUL AND 9 LBL

## (undated) DEVICE — GLOVE SURG SZ 65 L12IN FNGR THK79MIL GRN LTX FREE

## (undated) DEVICE — BANDAGE,SELF ADHRNT,COFLEX,4"X5YD,STRL: Brand: COLABEL

## (undated) DEVICE — C-ARM: Brand: UNBRANDED

## (undated) DEVICE — YANKAUER,FLEXIBLE HANDLE,REGLR CAPACITY: Brand: MEDLINE INDUSTRIES, INC.

## (undated) DEVICE — APPLICATOR MEDICATED 26 CC SOLUTION HI LT ORNG CHLORAPREP

## (undated) DEVICE — PACK SET UP

## (undated) DEVICE — 34" SINGLE PATIENT USE HOVERMATT BREATHABLE: Brand: SINGLE PATIENT USE HOVERMATT

## (undated) DEVICE — ADHESIVE SKIN CLSR 0.7ML TOP DERMBND ADV

## (undated) DEVICE — POUCH STRL HEAT SEAL 5X15IN

## (undated) DEVICE — SOLUTION IV IRRIG WATER 1000ML POUR BRL 2F7114

## (undated) DEVICE — ZINACTIVE USE 2539609 APPLICATOR MEDICATED 10.5 CC SOLUTION HI LT ORNG CHLORAPREP

## (undated) DEVICE — Device

## (undated) DEVICE — PENCIL ES CRD L10FT HND SWCHING ROCK SWCH W/ EDGE COAT BLDE

## (undated) DEVICE — SUTURE VCRL SZ 0 L27IN ABSRB UD L36MM CT-1 1/2 CIR J260H

## (undated) DEVICE — SOLUTION IRRIG 1000ML 0.9% SOD CHL USP POUR PLAS BTL

## (undated) DEVICE — STERILE COTTON BALLS LARGE 5/P: Brand: MEDLINE

## (undated) DEVICE — Z DISCONTINUED PER MEDLINE DRESSING ALG W9XL10CM SIL CVR WTRPRF VIR BACT BARR ANTIMIC

## (undated) DEVICE — 6619 2 PTNT ISO SYS INCISE AREA&LT;(&GT;&&LT;)&GT;P: Brand: STERI-DRAPE™ IOBAN™ 2

## (undated) DEVICE — GOWN,ECLIPSE,POLYRNF,BRTHSLV,L,30/CS: Brand: MEDLINE

## (undated) DEVICE — DRESSING,GAUZE,XEROFORM,CURAD,5"X9",ST: Brand: CURAD

## (undated) DEVICE — GLOVE ORANGE PI 7 1/2   MSG9075

## (undated) DEVICE — SPONGE LAP W18XL18IN WHT COT 4 PLY FLD STRUNG RADPQ DISP ST

## (undated) DEVICE — GLOVE SURG SZ 6 THK91MIL LTX FREE SYN POLYISOPRENE ANTI

## (undated) DEVICE — AMD ANTIMICROBIAL NON-ADHERENT PAD,0.2% POLYHEXAMETHYLENE BIGUANIDE HCI (PHMB): Brand: TELFA

## (undated) DEVICE — DRESSING TRNSPAR W5XL4.5IN FLM SHT SEMIPERMEABLE WIND

## (undated) DEVICE — ROD RMR L950MM DIA2.5MM W/ EXTN BALL TIP

## (undated) DEVICE — GUIDEWIRE ORTH L400MM DIA3.2MM FOR TFN

## (undated) DEVICE — INTENDED FOR TISSUE SEPARATION, AND OTHER PROCEDURES THAT REQUIRE A SHARP SURGICAL BLADE TO PUNCTURE OR CUT.: Brand: BARD-PARKER ® STAINLESS STEEL BLADES

## (undated) DEVICE — PAD,ABDOMINAL,5"X9",ST,LF,25/BX: Brand: MEDLINE INDUSTRIES, INC.

## (undated) DEVICE — Z DISCONTINUED PER MEDLINE USE 2741942 DRESSING AQUACEL 6 IN ALG W9XL15CM SIL CVR WTRPRF VIR BACT BARR ANTIMIC

## (undated) DEVICE — SPONGE GZ W4XL8IN COT WVN 12 PLY

## (undated) DEVICE — GLOVE SURG SZ 65 THK91MIL LTX FREE SYN POLYISOPRENE

## (undated) DEVICE — SHEET,DRAPE,53X77,STERILE: Brand: MEDLINE

## (undated) DEVICE — GLOVE SURG SZ 75 CRM LTX FREE POLYISOPRENE POLYMER BEAD ANTI